# Patient Record
Sex: MALE | Race: WHITE | NOT HISPANIC OR LATINO | Employment: OTHER | URBAN - METROPOLITAN AREA
[De-identification: names, ages, dates, MRNs, and addresses within clinical notes are randomized per-mention and may not be internally consistent; named-entity substitution may affect disease eponyms.]

---

## 2017-01-01 ENCOUNTER — APPOINTMENT (OUTPATIENT)
Dept: ONCOLOGY | Facility: HOSPITAL | Age: 72
End: 2017-01-01

## 2017-01-01 ENCOUNTER — INFUSION (OUTPATIENT)
Dept: ONCOLOGY | Facility: HOSPITAL | Age: 72
End: 2017-01-01

## 2017-01-01 ENCOUNTER — LAB (OUTPATIENT)
Dept: LAB | Facility: HOSPITAL | Age: 72
End: 2017-01-01

## 2017-01-01 ENCOUNTER — TELEPHONE (OUTPATIENT)
Dept: ONCOLOGY | Facility: CLINIC | Age: 72
End: 2017-01-01

## 2017-01-01 ENCOUNTER — SPECIALTY PHARMACY (OUTPATIENT)
Dept: ONCOLOGY | Facility: HOSPITAL | Age: 72
End: 2017-01-01

## 2017-01-01 ENCOUNTER — OFFICE VISIT (OUTPATIENT)
Dept: ONCOLOGY | Facility: CLINIC | Age: 72
End: 2017-01-01

## 2017-01-01 ENCOUNTER — TELEPHONE (OUTPATIENT)
Dept: NEUROLOGY | Facility: CLINIC | Age: 72
End: 2017-01-01

## 2017-01-01 ENCOUNTER — OFFICE VISIT (OUTPATIENT)
Dept: NEUROLOGY | Facility: CLINIC | Age: 72
End: 2017-01-01

## 2017-01-01 VITALS
HEIGHT: 70 IN | TEMPERATURE: 97.1 F | SYSTOLIC BLOOD PRESSURE: 138 MMHG | WEIGHT: 290 LBS | BODY MASS INDEX: 41.52 KG/M2 | RESPIRATION RATE: 20 BRPM | DIASTOLIC BLOOD PRESSURE: 78 MMHG | HEART RATE: 87 BPM

## 2017-01-01 VITALS
SYSTOLIC BLOOD PRESSURE: 118 MMHG | HEIGHT: 70 IN | WEIGHT: 290 LBS | BODY MASS INDEX: 41.52 KG/M2 | DIASTOLIC BLOOD PRESSURE: 68 MMHG

## 2017-01-01 VITALS
DIASTOLIC BLOOD PRESSURE: 72 MMHG | BODY MASS INDEX: 42.37 KG/M2 | TEMPERATURE: 97.4 F | HEIGHT: 70 IN | SYSTOLIC BLOOD PRESSURE: 152 MMHG | HEART RATE: 94 BPM | RESPIRATION RATE: 18 BRPM | WEIGHT: 296 LBS

## 2017-01-01 VITALS
WEIGHT: 293 LBS | RESPIRATION RATE: 22 BRPM | BODY MASS INDEX: 42.04 KG/M2 | TEMPERATURE: 97.7 F | DIASTOLIC BLOOD PRESSURE: 75 MMHG | HEART RATE: 78 BPM | SYSTOLIC BLOOD PRESSURE: 129 MMHG

## 2017-01-01 DIAGNOSIS — C90.02 MULTIPLE MYELOMA IN RELAPSE (HCC): ICD-10-CM

## 2017-01-01 DIAGNOSIS — C90.02 MULTIPLE MYELOMA IN RELAPSE (HCC): Primary | ICD-10-CM

## 2017-01-01 DIAGNOSIS — G40.109 LOCALIZATION-RELATED SYMPTOMATIC EPILEPSY AND EPILEPTIC SYNDROMES WITH SIMPLE PARTIAL SEIZURES, NOT INTRACTABLE, WITHOUT STATUS EPILEPTICUS (HCC): ICD-10-CM

## 2017-01-01 DIAGNOSIS — G40.109 LOCALIZATION-RELATED SYMPTOMATIC EPILEPSY AND EPILEPTIC SYNDROMES WITH SIMPLE PARTIAL SEIZURES, NOT INTRACTABLE, WITHOUT STATUS EPILEPTICUS (HCC): Primary | ICD-10-CM

## 2017-01-01 DIAGNOSIS — R25.1 TREMOR: ICD-10-CM

## 2017-01-01 DIAGNOSIS — C90.00 MULTIPLE MYELOMA, REMISSION STATUS UNSPECIFIED (HCC): Primary | ICD-10-CM

## 2017-01-01 DIAGNOSIS — G63 POLYNEUROPATHY ASSOCIATED WITH UNDERLYING DISEASE (HCC): ICD-10-CM

## 2017-01-01 LAB
ALBUMIN SERPL-MCNC: 3.6 G/DL (ref 3.2–4.8)
ALBUMIN SERPL-MCNC: 3.6 G/DL (ref 3.2–4.8)
ALBUMIN SERPL-MCNC: 3.8 G/DL (ref 3.2–4.8)
ALBUMIN SERPL-MCNC: 3.9 G/DL (ref 3.2–4.8)
ALBUMIN SERPL-MCNC: 4 G/DL (ref 3.2–4.8)
ALBUMIN SERPL-MCNC: 4 G/DL (ref 3.2–4.8)
ALBUMIN/GLOB SERPL: 1.6 G/DL (ref 1.5–2.5)
ALBUMIN/GLOB SERPL: 1.6 G/DL (ref 1.5–2.5)
ALBUMIN/GLOB SERPL: 1.7 G/DL (ref 1.5–2.5)
ALBUMIN/GLOB SERPL: 1.8 G/DL (ref 1.5–2.5)
ALBUMIN/GLOB SERPL: 2 G/DL (ref 1.5–2.5)
ALBUMIN/GLOB SERPL: 2.2 G/DL (ref 1.5–2.5)
ALP SERPL-CCNC: 116 U/L (ref 25–100)
ALP SERPL-CCNC: 169 U/L (ref 25–100)
ALP SERPL-CCNC: 226 U/L (ref 25–100)
ALP SERPL-CCNC: 279 U/L (ref 25–100)
ALP SERPL-CCNC: 71 U/L (ref 25–100)
ALP SERPL-CCNC: 93 U/L (ref 25–100)
ALT SERPL W P-5'-P-CCNC: 1315 U/L (ref 7–40)
ALT SERPL W P-5'-P-CCNC: 1318 U/L (ref 7–40)
ALT SERPL W P-5'-P-CCNC: 30 U/L (ref 7–40)
ALT SERPL W P-5'-P-CCNC: 34 U/L (ref 7–40)
ALT SERPL W P-5'-P-CCNC: 37 U/L (ref 7–40)
ALT SERPL W P-5'-P-CCNC: 710 U/L (ref 7–40)
ANION GAP SERPL CALCULATED.3IONS-SCNC: 3 MMOL/L (ref 3–11)
ANION GAP SERPL CALCULATED.3IONS-SCNC: 5 MMOL/L (ref 3–11)
ANION GAP SERPL CALCULATED.3IONS-SCNC: 6 MMOL/L (ref 3–11)
ANION GAP SERPL CALCULATED.3IONS-SCNC: 7 MMOL/L (ref 3–11)
ANION GAP SERPL CALCULATED.3IONS-SCNC: 7 MMOL/L (ref 3–11)
ANION GAP SERPL CALCULATED.3IONS-SCNC: 8 MMOL/L (ref 3–11)
AST SERPL-CCNC: 20 U/L (ref 0–33)
AST SERPL-CCNC: 27 U/L (ref 0–33)
AST SERPL-CCNC: 30 U/L (ref 0–33)
AST SERPL-CCNC: 391 U/L (ref 0–33)
AST SERPL-CCNC: 535 U/L (ref 0–33)
AST SERPL-CCNC: 92 U/L (ref 0–33)
BILIRUB SERPL-MCNC: 0.4 MG/DL (ref 0.3–1.2)
BILIRUB SERPL-MCNC: 0.5 MG/DL (ref 0.3–1.2)
BILIRUB SERPL-MCNC: 0.6 MG/DL (ref 0.3–1.2)
BILIRUB SERPL-MCNC: 0.6 MG/DL (ref 0.3–1.2)
BILIRUB SERPL-MCNC: 0.7 MG/DL (ref 0.3–1.2)
BILIRUB SERPL-MCNC: 0.8 MG/DL (ref 0.3–1.2)
BUN BLD-MCNC: 16 MG/DL (ref 9–23)
BUN BLD-MCNC: 17 MG/DL (ref 9–23)
BUN BLD-MCNC: 17 MG/DL (ref 9–23)
BUN BLD-MCNC: 18 MG/DL (ref 9–23)
BUN BLD-MCNC: 21 MG/DL (ref 9–23)
BUN BLD-MCNC: 22 MG/DL (ref 9–23)
BUN/CREAT SERPL: 18.9 (ref 7–25)
BUN/CREAT SERPL: 18.9 (ref 7–25)
BUN/CREAT SERPL: 20 (ref 7–25)
BUN/CREAT SERPL: 20 (ref 7–25)
BUN/CREAT SERPL: 24.4 (ref 7–25)
BUN/CREAT SERPL: 26.3 (ref 7–25)
CALCIUM SPEC-SCNC: 8.4 MG/DL (ref 8.7–10.4)
CALCIUM SPEC-SCNC: 8.6 MG/DL (ref 8.7–10.4)
CALCIUM SPEC-SCNC: 8.7 MG/DL (ref 8.7–10.4)
CALCIUM SPEC-SCNC: 8.9 MG/DL (ref 8.7–10.4)
CALCIUM SPEC-SCNC: 9 MG/DL (ref 8.7–10.4)
CALCIUM SPEC-SCNC: 9.4 MG/DL (ref 8.7–10.4)
CHLORIDE SERPL-SCNC: 103 MMOL/L (ref 99–109)
CHLORIDE SERPL-SCNC: 104 MMOL/L (ref 99–109)
CHLORIDE SERPL-SCNC: 107 MMOL/L (ref 99–109)
CHLORIDE SERPL-SCNC: 107 MMOL/L (ref 99–109)
CHLORIDE SERPL-SCNC: 108 MMOL/L (ref 99–109)
CHLORIDE SERPL-SCNC: 109 MMOL/L (ref 99–109)
CO2 SERPL-SCNC: 25 MMOL/L (ref 20–31)
CO2 SERPL-SCNC: 25 MMOL/L (ref 20–31)
CO2 SERPL-SCNC: 26 MMOL/L (ref 20–31)
CO2 SERPL-SCNC: 29 MMOL/L (ref 20–31)
CO2 SERPL-SCNC: 29 MMOL/L (ref 20–31)
CO2 SERPL-SCNC: 31 MMOL/L (ref 20–31)
CREAT BLD-MCNC: 0.8 MG/DL (ref 0.6–1.3)
CREAT BLD-MCNC: 0.8 MG/DL (ref 0.6–1.3)
CREAT BLD-MCNC: 0.9 MG/DL (ref 0.6–1.3)
CREAT BLDA-MCNC: 0.9 MG/DL (ref 0.6–1.3)
CREAT BLDA-MCNC: 0.9 MG/DL (ref 0.6–1.3)
ERYTHROCYTE [DISTWIDTH] IN BLOOD BY AUTOMATED COUNT: 16.5 % (ref 11.3–14.5)
ERYTHROCYTE [DISTWIDTH] IN BLOOD BY AUTOMATED COUNT: 17.2 % (ref 11.3–14.5)
ERYTHROCYTE [DISTWIDTH] IN BLOOD BY AUTOMATED COUNT: 18.4 % (ref 11.3–14.5)
ERYTHROCYTE [DISTWIDTH] IN BLOOD BY AUTOMATED COUNT: 18.6 % (ref 11.3–14.5)
ERYTHROCYTE [DISTWIDTH] IN BLOOD BY AUTOMATED COUNT: 19.4 % (ref 11.3–14.5)
GFR SERPL CREATININE-BSD FRML MDRD: 83 ML/MIN/1.73
GFR SERPL CREATININE-BSD FRML MDRD: 96 ML/MIN/1.73
GFR SERPL CREATININE-BSD FRML MDRD: 96 ML/MIN/1.73
GLOBULIN UR ELPH-MCNC: 1.8 GM/DL
GLOBULIN UR ELPH-MCNC: 1.9 GM/DL
GLOBULIN UR ELPH-MCNC: 2 GM/DL
GLOBULIN UR ELPH-MCNC: 2.2 GM/DL
GLOBULIN UR ELPH-MCNC: 2.3 GM/DL
GLOBULIN UR ELPH-MCNC: 2.5 GM/DL
GLUCOSE BLD-MCNC: 102 MG/DL (ref 70–100)
GLUCOSE BLD-MCNC: 102 MG/DL (ref 70–100)
GLUCOSE BLD-MCNC: 130 MG/DL (ref 70–100)
GLUCOSE BLD-MCNC: 136 MG/DL (ref 70–100)
GLUCOSE BLD-MCNC: 155 MG/DL (ref 70–100)
GLUCOSE BLD-MCNC: 184 MG/DL (ref 70–100)
HCT VFR BLD AUTO: 41.3 % (ref 38.9–50.9)
HCT VFR BLD AUTO: 41.4 % (ref 38.9–50.9)
HCT VFR BLD AUTO: 43.3 % (ref 38.9–50.9)
HCT VFR BLD AUTO: 43.4 % (ref 38.9–50.9)
HCT VFR BLD AUTO: 44.2 % (ref 38.9–50.9)
HGB BLD-MCNC: 12.9 G/DL (ref 13.1–17.5)
HGB BLD-MCNC: 13.2 G/DL (ref 13.1–17.5)
HGB BLD-MCNC: 13.6 G/DL (ref 13.1–17.5)
HGB BLD-MCNC: 13.7 G/DL (ref 13.1–17.5)
HGB BLD-MCNC: 13.9 G/DL (ref 13.1–17.5)
KAPPA LC SERPL-MCNC: 220.7 MG/L (ref 3.3–19.4)
KAPPA LC SERPL-MCNC: 252.4 MG/L (ref 3.3–19.4)
KAPPA LC/LAMBDA SER: 19.27 {RATIO} (ref 0.26–1.65)
KAPPA LC/LAMBDA SER: 23.23 {RATIO} (ref 0.26–1.65)
LAMBDA LC FREE SERPL-MCNC: 13.1 MG/L (ref 5.7–26.3)
LAMBDA LC FREE SERPL-MCNC: 9.5 MG/L (ref 5.7–26.3)
LYMPHOCYTES # BLD AUTO: 0.4 10*3/MM3 (ref 0.6–4.8)
LYMPHOCYTES # BLD AUTO: 0.6 10*3/MM3 (ref 0.6–4.8)
LYMPHOCYTES # BLD AUTO: 0.7 10*3/MM3 (ref 0.6–4.8)
LYMPHOCYTES # BLD AUTO: 1.3 10*3/MM3 (ref 0.6–4.8)
LYMPHOCYTES # BLD AUTO: 1.3 10*3/MM3 (ref 0.6–4.8)
LYMPHOCYTES NFR BLD AUTO: 11.6 % (ref 24–44)
LYMPHOCYTES NFR BLD AUTO: 17.6 % (ref 24–44)
LYMPHOCYTES NFR BLD AUTO: 24.3 % (ref 24–44)
LYMPHOCYTES NFR BLD AUTO: 28.1 % (ref 24–44)
LYMPHOCYTES NFR BLD AUTO: 36.3 % (ref 24–44)
MAGNESIUM SERPL-MCNC: 1.8 MG/DL (ref 1.3–2.7)
MAGNESIUM SERPL-MCNC: 1.9 MG/DL (ref 1.3–2.7)
MCH RBC QN AUTO: 30.7 PG (ref 27–31)
MCH RBC QN AUTO: 30.7 PG (ref 27–31)
MCH RBC QN AUTO: 30.8 PG (ref 27–31)
MCH RBC QN AUTO: 31 PG (ref 27–31)
MCH RBC QN AUTO: 31.1 PG (ref 27–31)
MCHC RBC AUTO-ENTMCNC: 31.3 G/DL (ref 32–36)
MCHC RBC AUTO-ENTMCNC: 31.5 G/DL (ref 32–36)
MCHC RBC AUTO-ENTMCNC: 31.9 G/DL (ref 32–36)
MCV RBC AUTO: 97 FL (ref 80–99)
MCV RBC AUTO: 97.3 FL (ref 80–99)
MCV RBC AUTO: 97.4 FL (ref 80–99)
MCV RBC AUTO: 98.5 FL (ref 80–99)
MCV RBC AUTO: 98.8 FL (ref 80–99)
MONOCYTES # BLD AUTO: 0.1 10*3/MM3 (ref 0–1)
MONOCYTES # BLD AUTO: 0.2 10*3/MM3 (ref 0–1)
MONOCYTES # BLD AUTO: 0.3 10*3/MM3 (ref 0–1)
MONOCYTES NFR BLD AUTO: 2.8 % (ref 0–12)
MONOCYTES NFR BLD AUTO: 2.8 % (ref 0–12)
MONOCYTES NFR BLD AUTO: 2.9 % (ref 0–12)
MONOCYTES NFR BLD AUTO: 8.1 % (ref 0–12)
MONOCYTES NFR BLD AUTO: 8.3 % (ref 0–12)
NEUTROPHILS # BLD AUTO: 1.7 10*3/MM3 (ref 1.5–8.3)
NEUTROPHILS # BLD AUTO: 1.9 10*3/MM3 (ref 1.5–8.3)
NEUTROPHILS # BLD AUTO: 2.5 10*3/MM3 (ref 1.5–8.3)
NEUTROPHILS # BLD AUTO: 3.1 10*3/MM3 (ref 1.5–8.3)
NEUTROPHILS # BLD AUTO: 3.8 10*3/MM3 (ref 1.5–8.3)
NEUTROPHILS NFR BLD AUTO: 55.6 % (ref 41–71)
NEUTROPHILS NFR BLD AUTO: 63.6 % (ref 41–71)
NEUTROPHILS NFR BLD AUTO: 72.9 % (ref 41–71)
NEUTROPHILS NFR BLD AUTO: 79.6 % (ref 41–71)
NEUTROPHILS NFR BLD AUTO: 85.5 % (ref 41–71)
PHOSPHATE SERPL-MCNC: 1.1 MG/DL (ref 2.4–5.1)
PHOSPHATE SERPL-MCNC: 1.8 MG/DL (ref 2.4–5.1)
PLATELET # BLD AUTO: 100 10*3/MM3 (ref 150–450)
PLATELET # BLD AUTO: 109 10*3/MM3 (ref 150–450)
PLATELET # BLD AUTO: 120 10*3/MM3 (ref 150–450)
PLATELET # BLD AUTO: 72 10*3/MM3 (ref 150–450)
PLATELET # BLD AUTO: 89 10*3/MM3 (ref 150–450)
PMV BLD AUTO: 7.7 FL (ref 6–12)
PMV BLD AUTO: 8 FL (ref 6–12)
PMV BLD AUTO: 8.7 FL (ref 6–12)
PMV BLD AUTO: 9.2 FL (ref 6–12)
PMV BLD AUTO: 9.2 FL (ref 6–12)
POTASSIUM BLD-SCNC: 4 MMOL/L (ref 3.5–5.5)
POTASSIUM BLD-SCNC: 4.1 MMOL/L (ref 3.5–5.5)
POTASSIUM BLD-SCNC: 4.3 MMOL/L (ref 3.5–5.5)
POTASSIUM BLD-SCNC: 4.3 MMOL/L (ref 3.5–5.5)
POTASSIUM BLD-SCNC: 4.4 MMOL/L (ref 3.5–5.5)
POTASSIUM BLD-SCNC: 4.5 MMOL/L (ref 3.5–5.5)
PROT SERPL-MCNC: 5.6 G/DL (ref 5.7–8.2)
PROT SERPL-MCNC: 5.7 G/DL (ref 5.7–8.2)
PROT SERPL-MCNC: 5.7 G/DL (ref 5.7–8.2)
PROT SERPL-MCNC: 5.8 G/DL (ref 5.7–8.2)
PROT SERPL-MCNC: 6.3 G/DL (ref 5.7–8.2)
PROT SERPL-MCNC: 6.5 G/DL (ref 5.7–8.2)
RBC # BLD AUTO: 4.19 10*6/MM3 (ref 4.2–5.76)
RBC # BLD AUTO: 4.27 10*6/MM3 (ref 4.2–5.76)
RBC # BLD AUTO: 4.39 10*6/MM3 (ref 4.2–5.76)
RBC # BLD AUTO: 4.45 10*6/MM3 (ref 4.2–5.76)
RBC # BLD AUTO: 4.54 10*6/MM3 (ref 4.2–5.76)
SODIUM BLD-SCNC: 136 MMOL/L (ref 132–146)
SODIUM BLD-SCNC: 139 MMOL/L (ref 132–146)
SODIUM BLD-SCNC: 141 MMOL/L (ref 132–146)
VALPROATE SERPL-MCNC: 45 MCG/ML (ref 50–150)
WBC NRBC COR # BLD: 2.6 10*3/MM3 (ref 3.5–10.8)
WBC NRBC COR # BLD: 3.2 10*3/MM3 (ref 3.5–10.8)
WBC NRBC COR # BLD: 3.5 10*3/MM3 (ref 3.5–10.8)
WBC NRBC COR # BLD: 3.6 10*3/MM3 (ref 3.5–10.8)
WBC NRBC COR # BLD: 5.2 10*3/MM3 (ref 3.5–10.8)

## 2017-01-01 PROCEDURE — 80053 COMPREHEN METABOLIC PANEL: CPT | Performed by: INTERNAL MEDICINE

## 2017-01-01 PROCEDURE — 84100 ASSAY OF PHOSPHORUS: CPT

## 2017-01-01 PROCEDURE — 36415 COLL VENOUS BLD VENIPUNCTURE: CPT

## 2017-01-01 PROCEDURE — 83735 ASSAY OF MAGNESIUM: CPT

## 2017-01-01 PROCEDURE — 80053 COMPREHEN METABOLIC PANEL: CPT

## 2017-01-01 PROCEDURE — 80164 ASSAY DIPROPYLACETIC ACD TOT: CPT | Performed by: INTERNAL MEDICINE

## 2017-01-01 PROCEDURE — 99214 OFFICE O/P EST MOD 30 MIN: CPT | Performed by: INTERNAL MEDICINE

## 2017-01-01 PROCEDURE — 36415 COLL VENOUS BLD VENIPUNCTURE: CPT | Performed by: INTERNAL MEDICINE

## 2017-01-01 PROCEDURE — 85025 COMPLETE CBC W/AUTO DIFF WBC: CPT

## 2017-01-01 PROCEDURE — 96374 THER/PROPH/DIAG INJ IV PUSH: CPT

## 2017-01-01 PROCEDURE — 83883 ASSAY NEPHELOMETRY NOT SPEC: CPT | Performed by: INTERNAL MEDICINE

## 2017-01-01 PROCEDURE — 25010000002 ZOLEDRONIC ACID PER 1 MG: Performed by: INTERNAL MEDICINE

## 2017-01-01 PROCEDURE — 82565 ASSAY OF CREATININE: CPT

## 2017-01-01 PROCEDURE — 99215 OFFICE O/P EST HI 40 MIN: CPT | Performed by: INTERNAL MEDICINE

## 2017-01-01 PROCEDURE — 99214 OFFICE O/P EST MOD 30 MIN: CPT | Performed by: PSYCHIATRY & NEUROLOGY

## 2017-01-01 RX ORDER — BENZONATATE 200 MG/1
CAPSULE ORAL
Refills: 0 | COMMUNITY
Start: 2017-01-01 | End: 2018-01-01

## 2017-01-01 RX ORDER — LENALIDOMIDE 25 MG/1
25 CAPSULE ORAL DAILY
Qty: 14 CAPSULE | Refills: 0 | Status: SHIPPED | OUTPATIENT
Start: 2017-01-01 | End: 2017-01-01 | Stop reason: SDUPTHER

## 2017-01-01 RX ORDER — PANTOPRAZOLE SODIUM 40 MG/1
40 TABLET, DELAYED RELEASE ORAL DAILY
Qty: 30 TABLET | Refills: 5 | Status: SHIPPED | OUTPATIENT
Start: 2017-01-01 | End: 2018-01-01 | Stop reason: SDUPTHER

## 2017-01-01 RX ORDER — PREDNISONE 20 MG/1
TABLET ORAL
Refills: 0 | COMMUNITY
Start: 2017-01-01 | End: 2018-01-01

## 2017-01-01 RX ORDER — SODIUM CHLORIDE 9 MG/ML
250 INJECTION, SOLUTION INTRAVENOUS ONCE
Status: CANCELLED | OUTPATIENT
Start: 2017-01-01

## 2017-01-01 RX ORDER — AMOXICILLIN AND CLAVULANATE POTASSIUM 875; 125 MG/1; MG/1
TABLET, FILM COATED ORAL
Refills: 0 | COMMUNITY
Start: 2017-01-01 | End: 2018-01-01

## 2017-01-01 RX ORDER — ALBUTEROL SULFATE 90 UG/1
POWDER, METERED RESPIRATORY (INHALATION)
Refills: 0 | COMMUNITY
Start: 2017-01-01 | End: 2018-01-01

## 2017-01-01 RX ORDER — DEXTROMETHORPHAN HYDROBROMIDE AND PROMETHAZINE HYDROCHLORIDE 15; 6.25 MG/5ML; MG/5ML
SYRUP ORAL
Refills: 0 | COMMUNITY
Start: 2017-01-01 | End: 2018-01-01

## 2017-01-01 RX ORDER — DEXAMETHASONE 4 MG/1
TABLET ORAL
Qty: 40 TABLET | Refills: 5 | Status: SHIPPED | OUTPATIENT
Start: 2017-01-01 | End: 2018-01-01 | Stop reason: SDUPTHER

## 2017-01-01 RX ORDER — BACLOFEN 10 MG/1
TABLET ORAL
Refills: 2 | COMMUNITY
Start: 2017-01-01

## 2017-01-01 RX ORDER — DIVALPROEX SODIUM 250 MG/1
TABLET, EXTENDED RELEASE ORAL
Qty: 120 TABLET | Refills: 1 | Status: SHIPPED | OUTPATIENT
Start: 2017-01-01 | End: 2018-01-01 | Stop reason: HOSPADM

## 2017-01-01 RX ORDER — PROMETHAZINE HYDROCHLORIDE AND PHENYLEPHRINE HYDROCHLORIDE 6.25; 5 MG/5ML; MG/5ML
SYRUP ORAL
Refills: 0 | COMMUNITY
Start: 2017-01-01 | End: 2018-01-01

## 2017-01-01 RX ORDER — LENALIDOMIDE 25 MG/1
25 CAPSULE ORAL DAILY
Qty: 14 CAPSULE | Refills: 0 | Status: SHIPPED | OUTPATIENT
Start: 2017-01-01 | End: 2018-01-01 | Stop reason: SDUPTHER

## 2017-01-01 RX ORDER — GABAPENTIN 300 MG/1
CAPSULE ORAL
Qty: 90 CAPSULE | Refills: 0 | Status: SHIPPED | OUTPATIENT
Start: 2017-01-01 | End: 2018-01-01 | Stop reason: SDUPTHER

## 2017-01-01 RX ORDER — GABAPENTIN 300 MG/1
CAPSULE ORAL
Qty: 90 CAPSULE | Refills: 1 | OUTPATIENT
Start: 2017-01-01 | End: 2018-01-01 | Stop reason: SDUPTHER

## 2017-01-01 RX ADMIN — ZOLEDRONIC ACID 4 MG: 4 INJECTION, SOLUTION, CONCENTRATE INTRAVENOUS at 11:50

## 2017-01-01 RX ADMIN — ZOLEDRONIC ACID 4 MG: 4 INJECTION, SOLUTION, CONCENTRATE INTRAVENOUS at 12:30

## 2017-01-03 RX ORDER — LENALIDOMIDE 25 MG/1
25 CAPSULE ORAL DAILY
Qty: 14 CAPSULE | Refills: 0 | Status: SHIPPED | OUTPATIENT
Start: 2017-01-03 | End: 2017-01-26 | Stop reason: SDUPTHER

## 2017-01-09 RX ORDER — GABAPENTIN 300 MG/1
CAPSULE ORAL
Qty: 90 CAPSULE | Refills: 0 | OUTPATIENT
Start: 2017-01-09

## 2017-01-26 RX ORDER — LENALIDOMIDE 25 MG/1
25 CAPSULE ORAL DAILY
Qty: 14 CAPSULE | Refills: 0 | Status: SHIPPED | OUTPATIENT
Start: 2017-01-26 | End: 2017-02-16 | Stop reason: SDUPTHER

## 2017-01-31 ENCOUNTER — OFFICE VISIT (OUTPATIENT)
Dept: NEUROLOGY | Facility: CLINIC | Age: 72
End: 2017-01-31

## 2017-01-31 VITALS — BODY MASS INDEX: 35.07 KG/M2 | HEIGHT: 70 IN | WEIGHT: 245 LBS

## 2017-01-31 DIAGNOSIS — G40.109 PARTIAL EPILEPSY (HCC): ICD-10-CM

## 2017-01-31 DIAGNOSIS — Z13.1 ENCOUNTER FOR SCREENING FOR DIABETES MELLITUS: ICD-10-CM

## 2017-01-31 DIAGNOSIS — N18.9 CHRONIC KIDNEY DISEASE, UNSPECIFIED STAGE: ICD-10-CM

## 2017-01-31 DIAGNOSIS — G40.109 SIMPLE PARTIAL SEIZURE, CONSCIOUSNESS NOT IMPAIRED (HCC): ICD-10-CM

## 2017-01-31 DIAGNOSIS — I63.9 CEREBROVASCULAR ACCIDENT (CVA), UNSPECIFIED MECHANISM (HCC): ICD-10-CM

## 2017-01-31 DIAGNOSIS — G62.9 PERIPHERAL POLYNEUROPATHY: ICD-10-CM

## 2017-01-31 DIAGNOSIS — C90.00 MULTIPLE MYELOMA AND IMMUNOPROLIFERATIVE NEOPLASMS (HCC): ICD-10-CM

## 2017-01-31 DIAGNOSIS — C88.9 MULTIPLE MYELOMA AND IMMUNOPROLIFERATIVE NEOPLASMS (HCC): ICD-10-CM

## 2017-01-31 DIAGNOSIS — G62.9 NEUROPATHY: Primary | ICD-10-CM

## 2017-01-31 PROBLEM — Z79.899 MEDICATION MANAGEMENT: Status: ACTIVE | Noted: 2017-01-31

## 2017-01-31 PROCEDURE — 99215 OFFICE O/P EST HI 40 MIN: CPT | Performed by: PSYCHIATRY & NEUROLOGY

## 2017-02-13 ENCOUNTER — TELEPHONE (OUTPATIENT)
Dept: NEUROLOGY | Facility: CLINIC | Age: 72
End: 2017-02-13

## 2017-02-13 NOTE — TELEPHONE ENCOUNTER
All notes and information that Dr. Mcclure listed in her note have been mailed to the patient for his appointment with .

## 2017-02-13 NOTE — TELEPHONE ENCOUNTER
----- Message from Yakelin Mcclure MD sent at 2/10/2017  2:38 PM EST -----  Regarding: RE: Referral   I spoke with Dr Ross at , and he said they would contact the pt to get him in soon. Can you please make copies of his old and recent NCS/EMG, all the labs I've done, his most recent note, and labs from hospital stay this past year, including CSF results (protein in particular), and maybe the most recent oncology note from Dr Segovia for the pt to hand carry to the appt? Thanks (not sure if he needs to sign something for us to do this).  ----- Message -----     From: Mariusz Black CMA     Sent: 2/6/2017   9:19 AM       To: Yakelin Mcclure MD  Subject: Referral                                       Got Mr. Ramirez set up with  with Dr. Ross, first available was July 7th. I went ahead and made that appointment. In your note you mentioned reaching out to the physician. I also had them put Mr. Ramirez on the wait list as well. AG  ----- Message -----     From: Yakelin Mcclure MD     Sent: 2/2/2017   5:39 PM       To: Mariusz Black CMA    I think I made referral to neuromuscular, but pls check. Pls also let pt know that I spoke with Luca, and we are uncertain if it is the revlimid that is causing problems (and dr segovia is reluctant to change it since it is working so well for his myeloma). Also let me know if the referral to  is set up for eg 4 months or any sort of long time, as I might then try to reach the physician myself.

## 2017-02-16 RX ORDER — LENALIDOMIDE 25 MG/1
25 CAPSULE ORAL DAILY
Qty: 14 CAPSULE | Refills: 0 | Status: SHIPPED | OUTPATIENT
Start: 2017-02-16 | End: 2017-04-14

## 2017-02-17 ENCOUNTER — LAB (OUTPATIENT)
Dept: LAB | Facility: HOSPITAL | Age: 72
End: 2017-02-17

## 2017-02-17 DIAGNOSIS — C90.02 MULTIPLE MYELOMA IN RELAPSE (HCC): ICD-10-CM

## 2017-02-17 LAB
ANION GAP SERPL CALCULATED.3IONS-SCNC: 8 MMOL/L (ref 3–11)
BUN BLD-MCNC: 19 MG/DL (ref 9–23)
BUN/CREAT SERPL: 23.8 (ref 7–25)
CALCIUM SPEC-SCNC: 9.5 MG/DL (ref 8.7–10.4)
CHLORIDE SERPL-SCNC: 106 MMOL/L (ref 99–109)
CO2 SERPL-SCNC: 27 MMOL/L (ref 20–31)
CREAT BLD-MCNC: 0.8 MG/DL (ref 0.6–1.3)
ERYTHROCYTE [DISTWIDTH] IN BLOOD BY AUTOMATED COUNT: 16.1 % (ref 11.3–14.5)
GFR SERPL CREATININE-BSD FRML MDRD: 96 ML/MIN/1.73
GLUCOSE BLD-MCNC: 86 MG/DL (ref 70–100)
HCT VFR BLD AUTO: 41.5 % (ref 38.9–50.9)
HGB BLD-MCNC: 13.3 G/DL (ref 13.1–17.5)
LYMPHOCYTES # BLD AUTO: 1.3 10*3/MM3 (ref 0.6–4.8)
LYMPHOCYTES NFR BLD AUTO: 46.3 % (ref 24–44)
MCH RBC QN AUTO: 31.1 PG (ref 27–31)
MCHC RBC AUTO-ENTMCNC: 32 G/DL (ref 32–36)
MCV RBC AUTO: 97.2 FL (ref 80–99)
MONOCYTES # BLD AUTO: 0.2 10*3/MM3 (ref 0–1)
MONOCYTES NFR BLD AUTO: 6 % (ref 0–12)
NEUTROPHILS # BLD AUTO: 1.3 10*3/MM3 (ref 1.5–8.3)
NEUTROPHILS NFR BLD AUTO: 47.7 % (ref 41–71)
PLATELET # BLD AUTO: 91 10*3/MM3 (ref 150–450)
PMV BLD AUTO: 7.9 FL (ref 6–12)
POTASSIUM BLD-SCNC: 4.5 MMOL/L (ref 3.5–5.5)
RBC # BLD AUTO: 4.27 10*6/MM3 (ref 4.2–5.76)
SODIUM BLD-SCNC: 141 MMOL/L (ref 132–146)
WBC NRBC COR # BLD: 2.7 10*3/MM3 (ref 3.5–10.8)

## 2017-02-17 PROCEDURE — 80048 BASIC METABOLIC PNL TOTAL CA: CPT | Performed by: INTERNAL MEDICINE

## 2017-02-17 PROCEDURE — 85025 COMPLETE CBC W/AUTO DIFF WBC: CPT

## 2017-02-17 PROCEDURE — 84165 PROTEIN E-PHORESIS SERUM: CPT | Performed by: INTERNAL MEDICINE

## 2017-02-17 PROCEDURE — 83883 ASSAY NEPHELOMETRY NOT SPEC: CPT | Performed by: INTERNAL MEDICINE

## 2017-02-17 PROCEDURE — 36415 COLL VENOUS BLD VENIPUNCTURE: CPT

## 2017-02-17 PROCEDURE — 84155 ASSAY OF PROTEIN SERUM: CPT | Performed by: INTERNAL MEDICINE

## 2017-02-18 LAB
KAPPA LC SERPL-MCNC: 635.66 MG/L (ref 3.3–19.4)
KAPPA LC/LAMBDA SER: 39.36 {RATIO} (ref 0.26–1.65)
LAMBDA LC FREE SERPL-MCNC: 16.15 MG/L (ref 5.71–26.3)

## 2017-02-20 LAB
ALBUMIN SERPL-MCNC: 3.5 G/DL (ref 2.9–4.4)
ALBUMIN/GLOB SERPL: 1.3 {RATIO} (ref 0.7–1.7)
ALPHA1 GLOB FLD ELPH-MCNC: 0.3 G/DL (ref 0–0.4)
ALPHA2 GLOB SERPL ELPH-MCNC: 0.5 G/DL (ref 0.4–1)
B-GLOBULIN SERPL ELPH-MCNC: 1 G/DL (ref 0.7–1.3)
GAMMA GLOB SERPL ELPH-MCNC: 0.8 G/DL (ref 0.4–1.8)
GLOBULIN SER CALC-MCNC: 2.6 G/DL (ref 2.2–3.9)
Lab: NORMAL
M-SPIKE: NORMAL G/DL
PROT SERPL-MCNC: 6.1 G/DL (ref 6–8.5)

## 2017-02-21 ENCOUNTER — OFFICE VISIT (OUTPATIENT)
Dept: ONCOLOGY | Facility: CLINIC | Age: 72
End: 2017-02-21

## 2017-02-21 ENCOUNTER — HOSPITAL ENCOUNTER (OUTPATIENT)
Dept: GENERAL RADIOLOGY | Facility: HOSPITAL | Age: 72
Discharge: HOME | End: 2017-02-21
Attending: INTERNAL MEDICINE | Admitting: INTERNAL MEDICINE

## 2017-02-21 VITALS
HEIGHT: 70 IN | SYSTOLIC BLOOD PRESSURE: 115 MMHG | HEART RATE: 73 BPM | WEIGHT: 264 LBS | BODY MASS INDEX: 37.8 KG/M2 | RESPIRATION RATE: 18 BRPM | TEMPERATURE: 97.9 F | DIASTOLIC BLOOD PRESSURE: 74 MMHG

## 2017-02-21 DIAGNOSIS — C90.02 MULTIPLE MYELOMA IN RELAPSE (HCC): Primary | ICD-10-CM

## 2017-02-21 DIAGNOSIS — C90.02 MULTIPLE MYELOMA IN RELAPSE (HCC): ICD-10-CM

## 2017-02-21 PROCEDURE — 99215 OFFICE O/P EST HI 40 MIN: CPT | Performed by: INTERNAL MEDICINE

## 2017-02-21 PROCEDURE — 77075 RADEX OSSEOUS SURVEY COMPL: CPT

## 2017-02-21 RX ORDER — METHYLPHENIDATE HYDROCHLORIDE 36 MG/1
36 TABLET ORAL EVERY MORNING
COMMUNITY
Start: 2017-02-15

## 2017-02-21 NOTE — PROGRESS NOTES
DATE OF VISIT: 2/21/2017    REASON FOR VISIT: Followup for multiple myeloma, M-spike at diagnosis 2.4,  kappa restricted, with diffuse lytic lesions.      HISTORY OF PRESENT ILLNESS: The patient is a very pleasant 70-year-old  gentleman with past medical history significant for multiple myeloma diagnosed  11/26/2013. The patient presented with back pain, had a CAT scan done that  showed multiple vertebral body fractures with lytic lesions. He had a T11  biopsy done that came back with plasmocytosis compatible with multiple myeloma.  The patient was referred to me on 12/03/2013. I did a bone marrow biopsy that  came back consistent with multiple myeloma. Cytogenics at this point are still  pending. Whole body bone survey showed multiple bony lytic lesions affecting  right humerus, vertebral bodies, as well as pelvic bones. The patient had  serum protein electrophoresis done at diagnosis that showed monoclonal protein  of 2.5 grams plus 0.5 grams, kappa restricted. 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 The  patient was started on Revlimid 1.2 mg/sq m subcu once weekly 2 weeks on and 1  week off, Velcade 25 mg p.o. daily 2 weeks on and 1 week off, and dexamethasone  40 mg p.o. weekly on 12/09/2013. The patient completed cycle #6 on 04/07/2014.  The patient received autologous stem cell transplant with Dr. Bry Johnson at  [email or web address] on 05/13/2014. His day 100 was 08/26/2014. He had serum  protein electrophoresis that failed to show any evidence of monoclonal protein,  serum free light chain ratio was slightly elevated. The patient was started on  Revlimid 15 mg p.o. daily 2 weeks on and 1 week off on 06/16/2015. The patient  is here today in scheduled followup visit.      SUBJECTIVE: The patient is doing fairly well. He denied any headaches, denied any night sweats. He is complaining of mild fatigue. No fever or chills, no more double vision.   He continues to complain of numbness in his feet worse on the left side gabapentin is helping to some degree.     REVIEW OF SYSTEMS: All the other 9 systems are reviewed by me and negative  except what is mentioned in HPI.      PAST MEDICAL HISTORY/SOCIAL HISTORY/FAMILY HISTORY: Unchanged from my prior  documentation done on 12/03/2013.       Current Outpatient Prescriptions:   •  aspirin 81 MG tablet, Take 81 mg by mouth Daily., Disp: , Rfl:   •  azelastine (ASTELIN) 0.1 % nasal spray, into each nostril 2 (two) times a day., Disp: , Rfl:   •  Calcium Carb-Cholecalciferol (CALCIUM + D3) 600-200 MG-UNIT tablet, Take  by mouth., Disp: , Rfl:   •  divalproex (DEPAKOTE) 500 MG 24 hr tablet, Take 2 tablets by mouth Daily., Disp: 90 tablet, Rfl: 5  •  docusate sodium (DOCQLACE) 100 MG capsule, Take  by mouth., Disp: , Rfl:   •  folic acid (FOLVITE) 1 MG tablet, TK 1 T PO QD, Disp: , Rfl: 5  •  gabapentin (NEURONTIN) 300 MG capsule, Take 300 mg by mouth 3 (Three) Times a Day., Disp: , Rfl:   •  Glucosamine-Chondroitin (OSTEO BI-FLEX REGULAR STRENGTH) 250-200 MG tablet, Take 1 tablet by mouth 2 (Two) Times a Day., Disp: , Rfl:   •  lenalidomide (REVLIMID) 25 MG capsule, Take 1 capsule by mouth Daily. 1 daily for 14 days, 7 days off. Auth : [id number], Disp: 14 capsule, Rfl: 0  •  levETIRAcetam (KEPPRA) 1000 MG tablet, Take 1 tablet by mouth Daily. PATIENT TAKING 1.5 TAB TWICE A DAY (Patient taking differently: Take 1,000 mg by mouth Daily. PATIENT TAKING 1/2 TAB TWICE A DAY), Disp: 90 tablet, Rfl: 6  •  loratadine (CLARITIN) 10 MG tablet, Take  by mouth., Disp: , Rfl:   •  methylphenidate (CONCERTA) 36 MG CR tablet, , Disp: , Rfl:   •  pantoprazole (PROTONIX) 40 MG EC tablet, TAKE 1 TABLET BY MOUTH EVERY DAY, Disp: 30 tablet, Rfl: 5  •  Probiotic Product (PROBIOTIC PO), Take 1 tablet by mouth 2 (Two) Times a Day As Needed., Disp: , Rfl:   •  Valerian Root 450 MG capsule, Take  by mouth., Disp: , Rfl:   •  vitamin B-12  "(CYANOCOBALAMIN) 500 MCG tablet, Take 500 mcg by mouth Daily., Disp: , Rfl:   •  warfarin (COUMADIN) 5 MG tablet, Take 5 mg by mouth As Needed (Tuesday 7.5mg   M W R F S S = 5mg)., Disp: , Rfl:     PHYSICAL EXAMINATION:   Visit Vitals   • /74   • Pulse 73   • Temp 97.9 °F (36.6 °C)   • Resp 18   • Ht 70\" (177.8 cm)   • Wt 264 lb (120 kg)   • BMI 37.88 kg/m2     ECOG1  GENERAL: Age appropriate. No acute distress.   HEENT: Head atraumatic, normocephalic.   NECK: Supple. No JVD. No lymphadenopathy.   LUNGS: Clear to auscultation bilaterally. No wheezing. No rhonchi.   HEART: Regular rate and rhythm. S1, S2, no murmurs.   ABDOMEN: Soft, nontender, nondistended. Bowel sounds positive. No  hepatosplenomegaly.   EXTREMITIES: No clubbing, cyanosis, or edema.   SKIN: No rashes. No purpura.   NEUROLOGIC: Awake and oriented x3. Strength 5 out of 5 in all muscle groups.     Lab on 02/17/2017   Component Date Value Ref Range Status   • Glucose 02/17/2017 86  70 - 100 mg/dL Final   • BUN 02/17/2017 19  9 - 23 mg/dL Final   • Creatinine 02/17/2017 0.80  0.60 - 1.30 mg/dL Final   • Sodium 02/17/2017 141  132 - 146 mmol/L Final   • Potassium 02/17/2017 4.5  3.5 - 5.5 mmol/L Final   • Chloride 02/17/2017 106  99 - 109 mmol/L Final   • CO2 02/17/2017 27.0  20.0 - 31.0 mmol/L Final   • Calcium 02/17/2017 9.5  8.7 - 10.4 mg/dL Final   • eGFR Non  Amer 02/17/2017 96  >60 mL/min/1.73 Final   • BUN/Creatinine Ratio 02/17/2017 23.8  7.0 - 25.0 Final   • Anion Gap 02/17/2017 8.0  3.0 - 11.0 mmol/L Final   • Total Protein 02/17/2017 6.1  6.0 - 8.5 g/dL Final   • Albumin 02/17/2017 3.5  2.9 - 4.4 g/dL Final   • Alpha-1-Globulin 02/17/2017 0.3  0.0 - 0.4 g/dL Final   • Alpha-2-Globulin 02/17/2017 0.5  0.4 - 1.0 g/dL Final   • Beta Globulin 02/17/2017 1.0  0.7 - 1.3 g/dL Final   • Gamma Globulin 02/17/2017 0.8  0.4 - 1.8 g/dL Final   • M-Matt 02/17/2017 Not Observed  Not Observed g/dL Final   • Globulin 02/17/2017 2.6  2.2 - 3.9 " g/dL Final   • A/G Ratio 02/17/2017 1.3  0.7 - 1.7 Final   • Please note 02/17/2017 Comment   Final    Protein electrophoresis scan will follow via computer, mail, or   delivery.   • Free Light Chain, Kappa 02/17/2017 635.66* 3.30 - 19.40 mg/L Final   • Free Lambda Light Chains 02/17/2017 16.15  5.71 - 26.30 mg/L Final   • Kappa/Lambda Ratio 02/17/2017 39.36* 0.26 - 1.65 Final   • WBC 02/17/2017 2.70* 3.50 - 10.80 10*3/mm3 Final   • RBC 02/17/2017 4.27  4.20 - 5.76 10*6/mm3 Final   • Hemoglobin 02/17/2017 13.3  13.1 - 17.5 g/dL Final   • Hematocrit 02/17/2017 41.5  38.9 - 50.9 % Final   • RDW 02/17/2017 16.1* 11.3 - 14.5 % Final   • MCV 02/17/2017 97.2  80.0 - 99.0 fL Final   • MCH 02/17/2017 31.1* 27.0 - 31.0 pg Final   • MCHC 02/17/2017 32.0  32.0 - 36.0 g/dL Final   • MPV 02/17/2017 7.9  6.0 - 12.0 fL Final   • Platelets 02/17/2017 91* 150 - 450 10*3/mm3 Final    Verified by repeat analysis.    • Neutrophil % 02/17/2017 47.7  41.0 - 71.0 % Final   • Lymphocyte % 02/17/2017 46.3* 24.0 - 44.0 % Final   • Monocyte % 02/17/2017 6.0  0.0 - 12.0 % Final   • Neutrophils, Absolute 02/17/2017 1.30* 1.50 - 8.30 10*3/mm3 Final   • Lymphocytes, Absolute 02/17/2017 1.30  0.60 - 4.80 10*3/mm3 Final   • Monocytes, Absolute 02/17/2017 0.20  0.00 - 1.00 10*3/mm3 Final        [place] Medicare Screening    Result Date: 8/12/2016  Narrative:  EXAMINATION: [place] MEDICARE SCREENING- 08/12/2016   INDICATION: Z87.891-Personal history of nicotine dependence; history of tobacco use.   TECHNIQUE: Multiple images through the retroperitoneum were obtained with a 4 MHz probe.   COMPARISON: NONE   FINDINGS:  Proximal aorta: 2.9 x 3 x 3.1 cm. Mid aorta: 2.3 x 2.3 x 2.4 cm. Distal aorta: 2 x 2.1 x 2.1 cm. Right iliac: 1.4 x 1.2 x 1.3 cm. Left iliac: 1.2 x 1.3 x 1.4 cm.   There is some echogenic material in the posterior wall of the distal aorta which may represent thrombus.      Impression:  1. Minimal ectasia of proximal aorta. 2.  Suggestion of thrombus in the distal aorta.   D:  08/12/2016 E:  08/12/2016      This report was finalized on 8/12/2016 4:22 PM by Dr. Andrew Ambriz MD.        ASSESSMENT: The patient is a very pleasant 70-year-old gentleman with new  diagnosis of multiple myeloma.     PROBLEM LIST:   1. Multiple myeloma, kappa restricted, M-spike 2.4 at diagnosis, ISS score  stage 2 with normal albumin but beta-2 microglobulin level of 4.1.   2. 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   3. Completed 6 cycles of Revlimid, Velcade, and dexamethasone from 12/09/2013  until 04/07/2014.  4. Received palliative radiation to T11, 4 treatments, completed 12/11/2013.   5. Status post autologous stem cell transplant done at [facility]  with Dr. Bry Johnson 05/13/2014.  6. Relapsed disease documented with elevated kappa lambda ratio with minute  amount of monoclonal protein done on 06/09/2015.   7. Bilateral pulmonary embolism, deep venous thrombosis 07/2014 on chronic  anticoagulation with Coumadin.  8. Chronic kidney disease Stage II.   9. 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   10. Started Revlimid 25 mg p.o. daily 2 weeks on and 1 week off on 06/16/2015.  11. Switched to Ninlaro on 02/21/2017  Secondary to increase in SFL ratio.  12. Partial seizure disorder.   13. Leukopenia and thrombocytopenia.   14.  Peripheral neuropathy     PLAN:  1. I did go over the results in detail with the patient. I explained to him  that he still does not have any evidence of monoclonal protein; however, his  Kappa lambda light chain ratio is steadily increasing, it went up from 25 on 11/18/2016 to 39 on 02/17/2017.   2. I will stop Revlimid 25 mg p.o. daily, 2 weeks on and 1 week off at this point. I will switch him to Ninlaro 4 mg weekly 3 weeks on and 1 week off.   3. The patient will come back to see me in 1  months with repeat blood work  prior to return, including serum free light chains.   4. I will order bone density prior to return.  I will consider starting Zometa 4 mg every 4 weeks.  5. We will continue folic acid daily. I am suspecting his  leukopenia and thrombocytopenia is secondary to Depakote and Keppra.   6. I will continue warfarin for history of DVT with monoclonal gammopathy.   7.  We'll continue gabapentin for neuropathy I told the patient to ask his neurologist to see if he could be switched to Lyrica, that might help with his worsening numbness in his feet.  8.  We did go over the potential side effects of Ninlaro including but not limited to peripheral neuropathy, edema, rash, diarrhea, low blood counts, and hepatic insufficiency.   9.  The patient is scheduled to follow-up with Dr. Reyes at [facility] in May 2017.  Patient might be a candidate for a second autologous stem cell transplant.    Rosalie Segovia MD  2/21/2017

## 2017-03-01 ENCOUNTER — TELEPHONE (OUTPATIENT)
Dept: ONCOLOGY | Facility: CLINIC | Age: 72
End: 2017-03-01

## 2017-03-01 DIAGNOSIS — C90.02 MULTIPLE MYELOMA IN RELAPSE (HCC): Primary | ICD-10-CM

## 2017-03-01 NOTE — TELEPHONE ENCOUNTER
Patient notified Ninlaro will be coming from [employer] with a $60 copay.  This is acceptable for patient.  Karrie will escribe Naseem to [employer].      Wed 3/1/2017 8:35 AM  '[email or web address]'    Ana Constantino    Re; Johnny Londonoory : 49    Ana Constantino,     Will you look for the above patients new Ninlaro prescription?  He is an established patient with you that has received Revlimid in the past.  Enclosed is his demographics and insurance cards.  Please let me know if anything else is needed.    Appreciate all your help.    Thanks,  Mariam Jaramillo CMA ([facility])

## 2017-03-03 ENCOUNTER — CLINICAL SUPPORT (OUTPATIENT)
Dept: ONCOLOGY | Facility: HOSPITAL | Age: 72
End: 2017-03-03

## 2017-03-03 NOTE — PLAN OF CARE
Oral Chemotherapy Teaching      Patient Name/:  Johnny Ramirez   1945  Oral Chemotherapy Regimen:  Ixazomib 4mg PO weekly Days 1, 8, 15 every 28 days  Date Started Medication: 3/6/2017    Initial Teaching Follow Up Comments     Safety     Storage instructions (away from children; away from heat/cold, sunlight, or moisture), handling - use of gloves (caregivers), washing hands after touching pills, managing waste     “How are you storing your medications?”, reminders on storage, proper handling (caregivers using gloves, washing hands, away from children, managing waste, etc.), disposal of medication with D/C or dosage change    Discussed proper storage of medication- dry, cool area.  Away from children and pets.  Discussed proper handling precautions- pt wash hands and other need to wear disposal gloves and hand wash. Discussed keeping pills in the original container until time to use.  Provided patient with a handout on how to open the medication.     Adherence      patient and/or caregiver on how to take medication, take with/without food, assess their adherence potential, stress importance of adherence, ways to manage adherence (pill boxes, phone reminders, calendars), what to do if miss a dose   “How are you taking your medication?” “How are you remembering to take your medication?”, “How many doses have you missed?”, determine reasons for non-adherence (not remembering, side effects, etc), ways to improve, overadherence? Remind patient of ways to improve/maintain adherence   Stressed importance of adherence.  Discussed what to do if he misses a pill- he can take the missed pill unless it is 72 hours without the next dose then skip that dose.  Discussed taking on an empty stomach (1 hour prior or 2 hours after a meal).  Recommended taking first thing in the AM or at bedtime.  Provided patient with personalized calendar so he can check off each dose if he wishes.     Side Effects/Adverse Reactions       patient on potential side effects, s/s, ways to manage, when to call MD/seek help     Determine if patient experiencing side effects, ways to manage  Discussed most common ADRs: low blood counts, infection risk, constipation, diarrhea, edema, peripheral neuropathy, skin rash, URTI, blurred vision, dry eye, conjunctivitis, nausea.  Advised pt to call MD if fever >100.4.  He has been experiencing some constipation due to lenalidomide.  He takes a stool softener daily.  Discussed continuing that until he sees how he reacts to this medication.  Advised to elevate legs with edema occurs.  Peripheral neuropathy is a big concern for this patient.  He is currently taking gabapentin and he is going to see a specialists about his PN shortly at .   Pt has had some itching on his left upper arm- I looked at this area and do not see a rash.  I suggested it might be dry skin- recommended Aveeno twice daily to help.  Discussed notifying us if a cough develops.  Long discussion about eye disorders and specifically what is conjunctivitis.  Patient has Zofran at home to take PRN for N/V.  Advised patient to wear a condom while on treatment and for at least 3 months after treatment is complete.  Patient does want to talk to someone about exercise options for himself - he is having difficulty due to severe PN in feet.  I will mention this to the dietician to see if she has any suggestions for us.       Miscellaneous     Food interactions, DDIs, financial issues Determine if patient started any new medications since being placed on oral chemo (analyze for DDI) No DDI to discuss.  Patient's copay is $60/month.  Provided patient with [employer] phone number to set up delivery.     Additional Notes:  Provided patient with chemo packet, PO chemo booklet, ixazomib education sheet, personalized calendar, introductions on how to open pill container, and my business card.  Advised pt to call me with any questions/concerns.  I will  F/U with patient about 4 days after he takes his first pill.

## 2017-03-06 ENCOUNTER — TELEPHONE (OUTPATIENT)
Dept: NUTRITION | Facility: HOSPITAL | Age: 72
End: 2017-03-06

## 2017-03-06 NOTE — PROGRESS NOTES
Outpatient Oncology Nutrition Services    Patient Name:  Johnny Ramirez  YOB: 1945  MRN: [id number]  Admit Date:  (Not on file)    Referral received from PharmROHAN, Karrie Shankar, regarding patient wanting to start an exercise regimen.  Patient's chart briefly reviewed and spoke with patient via phone call today.  Discussed partnership with [employer] for [employer] cancer survivors and he is interested in the program.  Written material with [employer] information mailed to patient.  He voiced understanding of information discussed and he denies other nutritional questions at this time.  Encouraged him to RD if questions arise.  RD available to assist prn.  Thanks,    Electronically signed by:  Clara Fairchild RD  03/06/17 2:31 PM

## 2017-03-10 ENCOUNTER — EDUCATION (OUTPATIENT)
Dept: ONCOLOGY | Facility: HOSPITAL | Age: 72
End: 2017-03-10

## 2017-03-10 NOTE — PLAN OF CARE
Oral Chemotherapy Teaching      Patient Name/:  Johnny Ramirez   1945  Oral Chemotherapy Regimen:  Ixazomib 4mg PO Days 1, 8, 15 every 28 days  Date Started Medication: 3/6/2017 (today is Day 5)    Initial Teaching Follow Up Comments     Safety     Storage instructions (away from children; away from heat/cold, sunlight, or moisture), handling - use of gloves (caregivers), washing hands after touching pills, managing waste     “How are you storing your medications?”, reminders on storage, proper handling (caregivers using gloves, washing hands, away from children, managing waste, etc.), disposal of medication with D/C or dosage change    Patient is storing med in safe, dry area.     Adherence      patient and/or caregiver on how to take medication, take with/without food, assess their adherence potential, stress importance of adherence, ways to manage adherence (pill boxes, phone reminders, calendars), what to do if miss a dose   “How are you taking your medication?” “How are you remembering to take your medication?”, “How many doses have you missed?”, determine reasons for non-adherence (not remembering, side effects, etc), ways to improve, overadherence? Remind patient of ways to improve/maintain adherence   Patient took his first dose in the evening on an empty stomach.     Side Effects/Adverse Reactions      patient on potential side effects, s/s, ways to manage, when to call MD/seek help     Determine if patient experiencing side effects, ways to manage  No side effects to date.  The only complaint is fatigue but he recently increased his gabapentin dosage per neurologist at  this week.  He is going to start PT and start an exercise program.  His constipation has resolved but he is continuing a daily stool softener.       Miscellaneous     Food interactions, DDIs, financial issues Determine if patient started any new medications since being placed on oral chemo (analyze for DDI) No new  medications to discuss.     Additional Notes:  I will F/U with patient next week.  Advised him to call me if he needs before then.

## 2017-03-16 ENCOUNTER — EDUCATION (OUTPATIENT)
Dept: ONCOLOGY | Facility: HOSPITAL | Age: 72
End: 2017-03-16

## 2017-03-16 NOTE — PLAN OF CARE
Oral Chemotherapy Teaching      Patient Name/:  Johnny Ramirez   1945  Oral Chemotherapy Regimen:  Ixazomib 4mg weekly Days 1, 8, 15 every 28 days  Date Started Medication: 3/6/2017 (Today Day 11)    Initial Teaching Follow Up Comments     Safety     Storage instructions (away from children; away from heat/cold, sunlight, or moisture), handling - use of gloves (caregivers), washing hands after touching pills, managing waste     “How are you storing your medications?”, reminders on storage, proper handling (caregivers using gloves, washing hands, away from children, managing waste, etc.), disposal of medication with D/C or dosage change         Adherence      patient and/or caregiver on how to take medication, take with/without food, assess their adherence potential, stress importance of adherence, ways to manage adherence (pill boxes, phone reminders, calendars), what to do if miss a dose   “How are you taking your medication?” “How are you remembering to take your medication?”, “How many doses have you missed?”, determine reasons for non-adherence (not remembering, side effects, etc), ways to improve, overadherence? Remind patient of ways to improve/maintain adherence   Patient has taken both doses on time.     Side Effects/Adverse Reactions      patient on potential side effects, s/s, ways to manage, when to call MD/seek help     Determine if patient experiencing side effects, ways to manage  Only complaint is fatigue.  He feels like the fatigue is worse with ixazomib versus the lenalidomide previously.  He still has itchy upper arms but he hasn't bought any moistuzier yet.  Patient is overall pleased with how he has reacted to the ixazomib.  No N/V, D/C, or eye changes to discuss.     Miscellaneous     Food interactions, DDIs, financial issues Determine if patient started any new medications since being placed on oral chemo (analyze for DDI)      Additional Notes:  I will F/U with patient  next week in clinic.

## 2017-03-17 ENCOUNTER — LAB (OUTPATIENT)
Dept: LAB | Facility: HOSPITAL | Age: 72
End: 2017-03-17
Attending: INTERNAL MEDICINE

## 2017-03-17 DIAGNOSIS — C90.02 MULTIPLE MYELOMA IN RELAPSE (HCC): ICD-10-CM

## 2017-03-17 LAB
ALBUMIN SERPL-MCNC: 4 G/DL (ref 3.2–4.8)
ALBUMIN/GLOB SERPL: 1.7 G/DL (ref 1.5–2.5)
ALP SERPL-CCNC: 73 U/L (ref 25–100)
ALT SERPL W P-5'-P-CCNC: 26 U/L (ref 7–40)
ANION GAP SERPL CALCULATED.3IONS-SCNC: 1 MMOL/L (ref 3–11)
AST SERPL-CCNC: 31 U/L (ref 0–33)
BILIRUB SERPL-MCNC: 0.4 MG/DL (ref 0.3–1.2)
BUN BLD-MCNC: 22 MG/DL (ref 9–23)
BUN/CREAT SERPL: 27.5 (ref 7–25)
CALCIUM SPEC-SCNC: 9.5 MG/DL (ref 8.7–10.4)
CHLORIDE SERPL-SCNC: 110 MMOL/L (ref 99–109)
CO2 SERPL-SCNC: 33 MMOL/L (ref 20–31)
CREAT BLD-MCNC: 0.8 MG/DL (ref 0.6–1.3)
ERYTHROCYTE [DISTWIDTH] IN BLOOD BY AUTOMATED COUNT: 15.8 % (ref 11.3–14.5)
GFR SERPL CREATININE-BSD FRML MDRD: 96 ML/MIN/1.73
GLOBULIN UR ELPH-MCNC: 2.3 GM/DL
GLUCOSE BLD-MCNC: 103 MG/DL (ref 70–100)
HCT VFR BLD AUTO: 39.9 % (ref 38.9–50.9)
HGB BLD-MCNC: 12.8 G/DL (ref 13.1–17.5)
LYMPHOCYTES # BLD AUTO: 1.2 10*3/MM3 (ref 0.6–4.8)
LYMPHOCYTES NFR BLD AUTO: 43 % (ref 24–44)
MCH RBC QN AUTO: 31.4 PG (ref 27–31)
MCHC RBC AUTO-ENTMCNC: 32.1 G/DL (ref 32–36)
MCV RBC AUTO: 97.7 FL (ref 80–99)
MONOCYTES # BLD AUTO: 0.2 10*3/MM3 (ref 0–1)
MONOCYTES NFR BLD AUTO: 8.3 % (ref 0–12)
NEUTROPHILS # BLD AUTO: 1.4 10*3/MM3 (ref 1.5–8.3)
NEUTROPHILS NFR BLD AUTO: 48.7 % (ref 41–71)
PLATELET # BLD AUTO: 109 10*3/MM3 (ref 150–450)
PMV BLD AUTO: 8.5 FL (ref 6–12)
POTASSIUM BLD-SCNC: 4.7 MMOL/L (ref 3.5–5.5)
PROT SERPL-MCNC: 6.3 G/DL (ref 5.7–8.2)
RBC # BLD AUTO: 4.08 10*6/MM3 (ref 4.2–5.76)
SODIUM BLD-SCNC: 144 MMOL/L (ref 132–146)
WBC NRBC COR # BLD: 2.8 10*3/MM3 (ref 3.5–10.8)

## 2017-03-17 PROCEDURE — 80053 COMPREHEN METABOLIC PANEL: CPT | Performed by: INTERNAL MEDICINE

## 2017-03-17 PROCEDURE — 85025 COMPLETE CBC W/AUTO DIFF WBC: CPT

## 2017-03-17 PROCEDURE — 36415 COLL VENOUS BLD VENIPUNCTURE: CPT

## 2017-03-17 PROCEDURE — 83883 ASSAY NEPHELOMETRY NOT SPEC: CPT | Performed by: INTERNAL MEDICINE

## 2017-03-19 LAB
KAPPA LC SERPL-MCNC: 874.15 MG/L (ref 3.3–19.4)
KAPPA LC/LAMBDA SER: 73.52 {RATIO} (ref 0.26–1.65)
LAMBDA LC FREE SERPL-MCNC: 11.89 MG/L (ref 5.71–26.3)

## 2017-03-21 ENCOUNTER — OFFICE VISIT (OUTPATIENT)
Dept: ONCOLOGY | Facility: CLINIC | Age: 72
End: 2017-03-21

## 2017-03-21 VITALS
BODY MASS INDEX: 38.37 KG/M2 | DIASTOLIC BLOOD PRESSURE: 75 MMHG | TEMPERATURE: 97.9 F | HEART RATE: 76 BPM | HEIGHT: 70 IN | SYSTOLIC BLOOD PRESSURE: 140 MMHG | RESPIRATION RATE: 18 BRPM | WEIGHT: 268 LBS

## 2017-03-21 DIAGNOSIS — C90.02 MULTIPLE MYELOMA IN RELAPSE (HCC): Primary | ICD-10-CM

## 2017-03-21 PROCEDURE — 99214 OFFICE O/P EST MOD 30 MIN: CPT | Performed by: INTERNAL MEDICINE

## 2017-03-21 NOTE — PROGRESS NOTES
DATE OF VISIT: 3/21/2017    REASON FOR VISIT: Followup for multiple myeloma, M-spike at diagnosis 2.4,  kappa restricted, with diffuse lytic lesions.      HISTORY OF PRESENT ILLNESS: The patient is a very pleasant 70-year-old  gentleman with past medical history significant for multiple myeloma diagnosed  11/26/2013. The patient presented with back pain, had a CAT scan done that  showed multiple vertebral body fractures with lytic lesions. He had a T11  biopsy done that came back with plasmocytosis compatible with multiple myeloma.  The patient was referred to me on 12/03/2013. I did a bone marrow biopsy that  came back consistent with multiple myeloma. Cytogenics at this point are still  pending. Whole body bone survey showed multiple bony lytic lesions affecting  right humerus, vertebral bodies, as well as pelvic bones. The patient had  serum protein electrophoresis done at diagnosis that showed monoclonal protein  of 2.5 grams plus 0.5 grams, kappa restricted. 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 The  patient was started on Revlimid 1.2 mg/sq m subcu once weekly 2 weeks on and 1  week off, Velcade 25 mg p.o. daily 2 weeks on and 1 week off, and dexamethasone  40 mg p.o. weekly on 12/09/2013. The patient completed cycle #6 on 04/07/2014.  The patient received autologous stem cell transplant with Dr. Bry Johnson at  [facility] on 05/13/2014. His day 100 was 08/26/2014. He had serum  protein electrophoresis that failed to show any evidence of monoclonal protein,  serum free light chain ratio was slightly elevated. The patient was started on  Revlimid 15 mg p.o. daily 2 weeks on and 1 week off on 06/16/2015. The patient's therapy was changed to Ninlaro 4 mg weekly 3 weeks on and 1 week off due to rising kappa lambda ratio.  The patient is here today in scheduled followup visit.    SUBJECTIVE: The patient has been doing fairly well. he was  able to tolerate  his treatment without any serious side effects. he denied any fever or  chills, no night sweats, denied any headaches    PAST MEDICAL HISTORY/SOCIAL HISTORY/FAMILY HISTORY: Unchanged from my prior documentation done on 12/03/2013.    Review of Systems   Constitutional: Positive for fatigue. Negative for activity change, appetite change, chills, fever and unexpected weight change.   HENT: Negative for hearing loss, mouth sores, nosebleeds, sore throat and trouble swallowing.    Eyes: Negative for visual disturbance.   Respiratory: Negative for cough, chest tightness, shortness of breath and wheezing.    Cardiovascular: Negative for chest pain, palpitations and leg swelling.   Gastrointestinal: Negative for abdominal distention, abdominal pain, blood in stool, constipation, diarrhea, nausea, rectal pain and vomiting.   Endocrine: Negative for cold intolerance and heat intolerance.   Genitourinary: Negative for difficulty urinating, dysuria, frequency and urgency.   Musculoskeletal: Positive for arthralgias. Negative for back pain, gait problem, joint swelling and myalgias.   Skin: Negative for rash.   Neurological: Positive for numbness. Negative for dizziness, tremors, syncope, weakness, light-headedness and headaches.   Hematological: Negative for adenopathy. Does not bruise/bleed easily.   Psychiatric/Behavioral: Negative for confusion, sleep disturbance and suicidal ideas. The patient is not nervous/anxious.          Current Outpatient Prescriptions:   •  lenalidomide (REVLIMID) 25 MG capsule, Take 1 capsule by mouth Daily. 1 daily for 14 days, 7 days off. Auth : [id number], Disp: 14 capsule, Rfl: 0  •  aspirin 81 MG tablet, Take 81 mg by mouth Daily., Disp: , Rfl:   •  azelastine (ASTELIN) 0.1 % nasal spray, into each nostril 2 (two) times a day., Disp: , Rfl:   •  Calcium Carb-Cholecalciferol (CALCIUM + D3) 600-200 MG-UNIT tablet, Take  by mouth., Disp: , Rfl:   •  divalproex (DEPAKOTE) 500 MG 24  "hr tablet, Take 2 tablets by mouth Daily., Disp: 90 tablet, Rfl: 5  •  docusate sodium (DOCQLACE) 100 MG capsule, Take  by mouth., Disp: , Rfl:   •  folic acid (FOLVITE) 1 MG tablet, TK 1 T PO QD, Disp: , Rfl: 5  •  gabapentin (NEURONTIN) 300 MG capsule, Take 300 mg by mouth 3 (Three) Times a Day., Disp: , Rfl:   •  Glucosamine-Chondroitin (OSTEO BI-FLEX REGULAR STRENGTH) 250-200 MG tablet, Take 1 tablet by mouth 2 (Two) Times a Day., Disp: , Rfl:   •  levETIRAcetam (KEPPRA) 1000 MG tablet, Take 1 tablet by mouth Daily. PATIENT TAKING 1.5 TAB TWICE A DAY (Patient taking differently: Take 500 mg by mouth 2 (Two) Times a Day. PATIENT TAKING 1/2 TAB TWICE A DAY), Disp: 90 tablet, Rfl: 6  •  loratadine (CLARITIN) 10 MG tablet, Take  by mouth., Disp: , Rfl:   •  methylphenidate (CONCERTA) 36 MG CR tablet, , Disp: , Rfl:   •  pantoprazole (PROTONIX) 40 MG EC tablet, TAKE 1 TABLET BY MOUTH EVERY DAY, Disp: 30 tablet, Rfl: 5  •  Probiotic Product (PROBIOTIC PO), Take 1 tablet by mouth 2 (Two) Times a Day As Needed., Disp: , Rfl:   •  Valerian Root 450 MG capsule, Take  by mouth., Disp: , Rfl:   •  vitamin B-12 (CYANOCOBALAMIN) 500 MCG tablet, Take 500 mcg by mouth Daily., Disp: , Rfl:   •  warfarin (COUMADIN) 5 MG tablet, Take 5 mg by mouth As Needed., Disp: , Rfl:     PHYSICAL EXAMINATION:   Visit Vitals   • /75   • Pulse 76   • Temp 97.9 °F (36.6 °C)   • Resp 18   • Ht 70\" (177.8 cm)   • Wt 268 lb (122 kg)   • BMI 38.45 kg/m2      ECOG Performance Status: 1 - Symptomatic but completely ambulatory  General Appearance:  alert, cooperative, no apparent distress and appears stated age   Neurologic/Psychiatric: A&O x 3, gait steady, appropriate affect, strength 5/5 in all muscle groups   HEENT:  Normocephalic, without obvious abnormality, mucous membranes moist   Neck: Supple, symmetrical, trachea midline, no adenopathy;  No thyromegaly, masses, or tenderness   Lungs:   Clear to auscultation bilaterally; respirations " regular, even, and unlabored bilaterally   Heart:  Regular rate and rhythm, no murmurs appreciated   Abdomen:   Soft, non-tender, non-distended and no organomegaly   Lymph nodes: No cervical, supraclavicular, inguinal or axillary adenopathy noted   Extremities: Normal, atraumatic; no clubbing, cyanosis, or edema    Skin: No rashes, ulcers, or suspicious lesions noted     Lab on 03/17/2017   Component Date Value Ref Range Status   • Glucose 03/17/2017 103* 70 - 100 mg/dL Final   • BUN 03/17/2017 22  9 - 23 mg/dL Final   • Creatinine 03/17/2017 0.80  0.60 - 1.30 mg/dL Final   • Sodium 03/17/2017 144  132 - 146 mmol/L Final   • Potassium 03/17/2017 4.7  3.5 - 5.5 mmol/L Final   • Chloride 03/17/2017 110* 99 - 109 mmol/L Final   • CO2 03/17/2017 33.0* 20.0 - 31.0 mmol/L Final   • Calcium 03/17/2017 9.5  8.7 - 10.4 mg/dL Final   • Total Protein 03/17/2017 6.3  5.7 - 8.2 g/dL Final   • Albumin 03/17/2017 4.00  3.20 - 4.80 g/dL Final   • ALT (SGPT) 03/17/2017 26  7 - 40 U/L Final   • AST (SGOT) 03/17/2017 31  0 - 33 U/L Final   • Alkaline Phosphatase 03/17/2017 73  25 - 100 U/L Final   • Total Bilirubin 03/17/2017 0.4  0.3 - 1.2 mg/dL Final   • eGFR Non African Amer 03/17/2017 96  >60 mL/min/1.73 Final   • Globulin 03/17/2017 2.3  gm/dL Final   • A/G Ratio 03/17/2017 1.7  1.5 - 2.5 g/dL Final   • BUN/Creatinine Ratio 03/17/2017 27.5* 7.0 - 25.0 Final   • Anion Gap 03/17/2017 1.0* 3.0 - 11.0 mmol/L Final   • Free Light Chain, Kappa 03/17/2017 874.15* 3.30 - 19.40 mg/L Final   • Free Lambda Light Chains 03/17/2017 11.89  5.71 - 26.30 mg/L Final   • Kappa/Lambda Ratio 03/17/2017 73.52* 0.26 - 1.65 Final   • WBC 03/17/2017 2.80* 3.50 - 10.80 10*3/mm3 Final   • RBC 03/17/2017 4.08* 4.20 - 5.76 10*6/mm3 Final   • Hemoglobin 03/17/2017 12.8* 13.1 - 17.5 g/dL Final   • Hematocrit 03/17/2017 39.9  38.9 - 50.9 % Final   • RDW 03/17/2017 15.8* 11.3 - 14.5 % Final   • MCV 03/17/2017 97.7  80.0 - 99.0 fL Final   • MCH 03/17/2017 31.4*  27.0 - 31.0 pg Final   • MCHC 03/17/2017 32.1  32.0 - 36.0 g/dL Final   • MPV 03/17/2017 8.5  6.0 - 12.0 fL Final   • Platelets 03/17/2017 109* 150 - 450 10*3/mm3 Final    Verified by repeat analysis.    • Neutrophil % 03/17/2017 48.7  41.0 - 71.0 % Final   • Lymphocyte % 03/17/2017 43.0  24.0 - 44.0 % Final   • Monocyte % 03/17/2017 8.3  0.0 - 12.0 % Final   • Neutrophils, Absolute 03/17/2017 1.40* 1.50 - 8.30 10*3/mm3 Final   • Lymphocytes, Absolute 03/17/2017 1.20  0.60 - 4.80 10*3/mm3 Final   • Monocytes, Absolute 03/17/2017 0.20  0.00 - 1.00 10*3/mm3 Final        Xr Bone Survey Complete    Result Date: 2/22/2017  Narrative: EXAMINATION: XR BONE SURVEY, COMPLETE-02/21/2017:  INDICATION: Follow-up scan, restage; C90.02-Multiple myeloma in relapse.   COMPARISON: 06/09/2015.  FINDINGS: The heart size is normal. There is some atelectasis in the lingula. The remainder of the lungs are free of opacities. There is some degenerative change in the lower thoracic spine, stable since 06/09/2015. The bony calvarium is intact. The humeri are grossly unremarkable. The pelvic ring is intact. There is minimal lucency in the left femoral head at the head and neck junction. Minimal areas of lucency are present in the proximal portions of the femurs. Degenerative changes are present throughout the lumbar spine. A few areas of lucency are present in the left femoral diaphysis; unchanged since 06/09/2015.        Impression: 1.  Minimal areas of osteolysis in the femurs, not significantly changed since 06/09/2015. 2.  No new areas of osteolysis. 3.  There are no significant areas of osteolysis in the humeri.  D:  02/21/2017 E:  02/22/2017  This report was finalized on 2/22/2017 1:03 PM by Dr. Andrew Ambriz MD.        ASSESSMENT: The patient is a very pleasant 71-year-old gentleman with new  diagnosis of multiple myeloma.    PROBLEM LIST:  1. Multiple myeloma, kappa restricted, M-spike 2.4 at diagnosis, ISS score  stage 2 with normal  albumin but beta-2 microglobulin level of 4.1.   2. 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   3. Completed 6 cycles of Revlimid, Velcade, and dexamethasone from 12/09/2013  until 04/07/2014.  4. Received palliative radiation to T11, 4 treatments, completed 12/11/2013.   5. Status post autologous stem cell transplant done at [facility]  with Dr. Bry Johnson 05/13/2014.  6. Relapsed disease documented with elevated kappa lambda ratio with minute  amount of monoclonal protein done on 06/09/2015.   7. Bilateral pulmonary embolism, deep venous thrombosis 07/2014 on chronic  anticoagulation with Coumadin.  8. Chronic kidney disease Stage II.   9. 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   10. Started Revlimid 25 mg p.o. daily 2 weeks on and 1 week off on 06/16/2015.  11. Switched to Ninlaro on 02/21/2017 Secondary to increase in SFL ratio.  12. Partial seizure disorder.   13. Leukopenia and thrombocytopenia.   14. Peripheral neuropathy    PLAN:  1. I did go over the results in detail with the patient. I explained to him  that he still does not have any evidence of monoclonal protein; however, his  Kappa lambda light chain ratio is steadily increasing, it went up from 25 on 11/18/2016 to 39 on 02/17/2017, and is now up to 73.   2. We will continue Ninlaro 4 mg weekly 3 weeks on and 1 week off.  I would add Decadron as well as Revlimid if if his serum free light chains did not improve.  3. The patient will come back to see me in 1 months with repeat blood work  prior to return, including serum free light chains.   4. I reviewed the bone survey results with the patient. I told him there are no new lytic lesions. .  5. We will continue folic acid daily. I am suspecting his  leukopenia and thrombocytopenia is secondary to Depakote and Keppra.   6. I will continue warfarin for  history of DVT with monoclonal gammopathy.   7.  The patient is still on Neurontin for peripheral neuropathy. He will follow up with his neurologist regarding management.   8.  I reviewed the potential side effects of Ninlaro including but not limited to peripheral neuropathy, edema, rash, diarrhea, low blood counts, and hepatic insufficiency.   9.  The patient is scheduled to follow-up with Dr. Reyes at [facility] in May 2017.  Patient might be a candidate for a second autologous stem cell transplant.    Scribed for Rosalie Segovia MD by CHRISTY Cabrera. 3/21/2017  1:23 PM  Rosalie Segovia MD  3/21/2017   I, Rosalie Segovia MD, personally performed the services described in this documentation as scribed by the above named individual in my presence, and it is both accurate and complete.  3/21/2017  1:45 PM

## 2017-03-22 ENCOUNTER — EDUCATION (OUTPATIENT)
Dept: ONCOLOGY | Facility: HOSPITAL | Age: 72
End: 2017-03-22

## 2017-03-22 NOTE — PLAN OF CARE
Oral Chemotherapy Teaching      Patient Name/:  Johnny Ramirez   1945  Oral Chemotherapy Regimen:  Ixazomib 4mg weekly Days 1, 8, 15 every 28 days  Date Started Medication: 3/6/2017 (today Day 17)    Initial Teaching Follow Up Comments     Safety     Storage instructions (away from children; away from heat/cold, sunlight, or moisture), handling - use of gloves (caregivers), washing hands after touching pills, managing waste     “How are you storing your medications?”, reminders on storage, proper handling (caregivers using gloves, washing hands, away from children, managing waste, etc.), disposal of medication with D/C or dosage change         Adherence      patient and/or caregiver on how to take medication, take with/without food, assess their adherence potential, stress importance of adherence, ways to manage adherence (pill boxes, phone reminders, calendars), what to do if miss a dose   “How are you taking your medication?” “How are you remembering to take your medication?”, “How many doses have you missed?”, determine reasons for non-adherence (not remembering, side effects, etc), ways to improve, overadherence? Remind patient of ways to improve/maintain adherence   Patient took all 3 pills on time on an empty stomach.     Side Effects/Adverse Reactions      patient on potential side effects, s/s, ways to manage, when to call MD/seek help     Determine if patient experiencing side effects, ways to manage  Patient had a headache one day last week but Tyenlol was able to control the pain.  No complaints with diarrhea, constipation, N/V, rash, or vision changes.     Miscellaneous     Food interactions, DDIs, financial issues Determine if patient started any new medications since being placed on oral chemo (analyze for DDI) Discussed that [facility] will call him early next week to set up delivery for cycle 2.     Additional Notes:  I will F/U with patient in 2 weeks.

## 2017-04-06 ENCOUNTER — EDUCATION (OUTPATIENT)
Dept: ONCOLOGY | Facility: HOSPITAL | Age: 72
End: 2017-04-06

## 2017-04-06 NOTE — PLAN OF CARE
Oral Chemotherapy Teaching      Patient Name/:  Johnny Ramirez   1945  Oral Chemotherapy Regimen:  Ixazomib 4mg PO Days 1, 8, 15 every 28 days  Date Started Medication: Cycle 2: 4/3/2017 (Today Day 4)    Initial Teaching Follow Up Comments     Safety     Storage instructions (away from children; away from heat/cold, sunlight, or moisture), handling - use of gloves (caregivers), washing hands after touching pills, managing waste     “How are you storing your medications?”, reminders on storage, proper handling (caregivers using gloves, washing hands, away from children, managing waste, etc.), disposal of medication with D/C or dosage change         Adherence      patient and/or caregiver on how to take medication, take with/without food, assess their adherence potential, stress importance of adherence, ways to manage adherence (pill boxes, phone reminders, calendars), what to do if miss a dose   “How are you taking your medication?” “How are you remembering to take your medication?”, “How many doses have you missed?”, determine reasons for non-adherence (not remembering, side effects, etc), ways to improve, overadherence? Remind patient of ways to improve/maintain adherence   Patient has not missed any doses.  He is taking the pills on Monday evenings at bedtime.     Side Effects/Adverse Reactions      patient on potential side effects, s/s, ways to manage, when to call MD/seek help     Determine if patient experiencing side effects, ways to manage  Patient states this has been easier than Revlimid previously.  His energy level has increased and he can do more things.  No issues with N/V, diarrhea, constipation, or vision changes.     Miscellaneous     Food interactions, DDIs, financial issues Determine if patient started any new medications since being placed on oral chemo (analyze for DDI) No new medications to discuss.     Additional Notes:  I will F/U with patient next week in clinic.  Patient knows to call with any questions/concerns before then.

## 2017-04-11 ENCOUNTER — LAB (OUTPATIENT)
Dept: LAB | Facility: HOSPITAL | Age: 72
End: 2017-04-11

## 2017-04-11 DIAGNOSIS — C90.02 MULTIPLE MYELOMA IN RELAPSE (HCC): ICD-10-CM

## 2017-04-11 LAB
ALBUMIN SERPL-MCNC: 4.1 G/DL (ref 3.2–4.8)
ALBUMIN/GLOB SERPL: 1.6 G/DL (ref 1.5–2.5)
ALP SERPL-CCNC: 63 U/L (ref 25–100)
ALT SERPL W P-5'-P-CCNC: 24 U/L (ref 7–40)
ANION GAP SERPL CALCULATED.3IONS-SCNC: 1 MMOL/L (ref 3–11)
AST SERPL-CCNC: 28 U/L (ref 0–33)
BILIRUB SERPL-MCNC: 0.5 MG/DL (ref 0.3–1.2)
BUN BLD-MCNC: 18 MG/DL (ref 9–23)
BUN/CREAT SERPL: 22.5 (ref 7–25)
CALCIUM SPEC-SCNC: 9.8 MG/DL (ref 8.7–10.4)
CHLORIDE SERPL-SCNC: 110 MMOL/L (ref 99–109)
CO2 SERPL-SCNC: 30 MMOL/L (ref 20–31)
CREAT BLD-MCNC: 0.8 MG/DL (ref 0.6–1.3)
ERYTHROCYTE [DISTWIDTH] IN BLOOD BY AUTOMATED COUNT: 15.7 % (ref 11.3–14.5)
GFR SERPL CREATININE-BSD FRML MDRD: 96 ML/MIN/1.73
GLOBULIN UR ELPH-MCNC: 2.5 GM/DL
GLUCOSE BLD-MCNC: 111 MG/DL (ref 70–100)
HCT VFR BLD AUTO: 43.1 % (ref 38.9–50.9)
HGB BLD-MCNC: 13.8 G/DL (ref 13.1–17.5)
LYMPHOCYTES # BLD AUTO: 1.3 10*3/MM3 (ref 0.6–4.8)
LYMPHOCYTES NFR BLD AUTO: 32.1 % (ref 24–44)
MCH RBC QN AUTO: 31.2 PG (ref 27–31)
MCHC RBC AUTO-ENTMCNC: 32.1 G/DL (ref 32–36)
MCV RBC AUTO: 97.3 FL (ref 80–99)
MONOCYTES # BLD AUTO: 0.2 10*3/MM3 (ref 0–1)
MONOCYTES NFR BLD AUTO: 5.1 % (ref 0–12)
NEUTROPHILS # BLD AUTO: 2.6 10*3/MM3 (ref 1.5–8.3)
NEUTROPHILS NFR BLD AUTO: 62.8 % (ref 41–71)
PLATELET # BLD AUTO: 131 10*3/MM3 (ref 150–450)
PMV BLD AUTO: 7.9 FL (ref 6–12)
POTASSIUM BLD-SCNC: 4.3 MMOL/L (ref 3.5–5.5)
PROT SERPL-MCNC: 6.6 G/DL (ref 5.7–8.2)
RBC # BLD AUTO: 4.43 10*6/MM3 (ref 4.2–5.76)
SODIUM BLD-SCNC: 141 MMOL/L (ref 132–146)
WBC NRBC COR # BLD: 4.2 10*3/MM3 (ref 3.5–10.8)

## 2017-04-11 PROCEDURE — 85025 COMPLETE CBC W/AUTO DIFF WBC: CPT

## 2017-04-11 PROCEDURE — 36415 COLL VENOUS BLD VENIPUNCTURE: CPT

## 2017-04-11 PROCEDURE — 83883 ASSAY NEPHELOMETRY NOT SPEC: CPT

## 2017-04-11 PROCEDURE — 80053 COMPREHEN METABOLIC PANEL: CPT

## 2017-04-12 LAB
KAPPA LC SERPL-MCNC: 1091.53 MG/L (ref 3.3–19.4)
KAPPA LC/LAMBDA SER: 119.29 {RATIO} (ref 0.26–1.65)
LAMBDA LC FREE SERPL-MCNC: 9.15 MG/L (ref 5.71–26.3)

## 2017-04-14 ENCOUNTER — EDUCATION (OUTPATIENT)
Dept: PHARMACY | Facility: HOSPITAL | Age: 72
End: 2017-04-14

## 2017-04-14 ENCOUNTER — OFFICE VISIT (OUTPATIENT)
Dept: ONCOLOGY | Facility: CLINIC | Age: 72
End: 2017-04-14

## 2017-04-14 VITALS
SYSTOLIC BLOOD PRESSURE: 117 MMHG | BODY MASS INDEX: 38.37 KG/M2 | WEIGHT: 268 LBS | RESPIRATION RATE: 18 BRPM | HEIGHT: 70 IN | HEART RATE: 77 BPM | TEMPERATURE: 98.7 F | DIASTOLIC BLOOD PRESSURE: 75 MMHG

## 2017-04-14 DIAGNOSIS — C90.02 MULTIPLE MYELOMA IN RELAPSE (HCC): Primary | ICD-10-CM

## 2017-04-14 PROCEDURE — 99215 OFFICE O/P EST HI 40 MIN: CPT | Performed by: INTERNAL MEDICINE

## 2017-04-14 RX ORDER — LENALIDOMIDE 25 MG/1
25 CAPSULE ORAL DAILY
Qty: 14 CAPSULE | Refills: 0 | Status: SHIPPED | OUTPATIENT
Start: 2017-04-14 | End: 2017-06-21 | Stop reason: SDUPTHER

## 2017-04-14 RX ORDER — IXAZOMIB 4 MG/1
CAPSULE ORAL
COMMUNITY
Start: 2017-03-28 | End: 2017-05-26 | Stop reason: SDUPTHER

## 2017-04-14 RX ORDER — SULFAMETHOXAZOLE AND TRIMETHOPRIM 800; 160 MG/1; MG/1
1 TABLET ORAL 2 TIMES DAILY
Qty: 60 TABLET | Refills: 3 | Status: SHIPPED | OUTPATIENT
Start: 2017-04-14 | End: 2018-01-01

## 2017-04-14 RX ORDER — SODIUM CHLORIDE 9 MG/ML
250 INJECTION, SOLUTION INTRAVENOUS ONCE
Status: CANCELLED | OUTPATIENT
Start: 2017-07-07

## 2017-04-14 RX ORDER — DEXAMETHASONE 4 MG/1
40 TABLET ORAL
Qty: 40 TABLET | Refills: 3 | Status: SHIPPED | OUTPATIENT
Start: 2017-04-14 | End: 2017-01-01 | Stop reason: SDUPTHER

## 2017-04-14 RX ORDER — ONDANSETRON HYDROCHLORIDE 8 MG/1
8 TABLET, FILM COATED ORAL 3 TIMES DAILY PRN
Qty: 30 TABLET | Refills: 5 | Status: SHIPPED | OUTPATIENT
Start: 2017-04-14 | End: 2018-01-01

## 2017-04-14 NOTE — PLAN OF CARE
Oral Chemotherapy Teaching      Patient Name/:  Johnny Ramirez   1945  Oral Chemotherapy Regimen: Ixazomib 4mg PO Days 1, 8, 15 every 28 days  Date Started Medication: Cycle 2 (Day 12)           Initial Teaching Follow Up Comments      Safety      Storage instructions (away from children; away from heat/cold, sunlight, or moisture), handling - use of gloves (caregivers), washing hands after touching pills, managing waste    “How are you storing your medications?”, reminders on storage, proper handling (caregivers using gloves, washing hands, away from children, managing waste, etc.), disposal of medication with D/C or dosage change  Patient is storing med in safe, dry area. PT reports washing his hands after handling medication.      Adherence       patient and/or caregiver on how to take medication, take with/without food, assess their adherence potential, stress importance of adherence, ways to manage adherence (pill boxes, phone reminders, calendars), what to do if miss a dose    “How are you taking your medication?” “How are you remembering to take your medication?”, “How many doses have you missed?”, determine reasons for non-adherence (not remembering, side effects, etc), ways to improve, overadherence? Remind patient of ways to improve/maintain adherence Patient takes medication in the evening on an empty stomach and has not missed any doses.      Side Effects/Adverse Reactions       patient on potential side effects, s/s, ways to manage, when to call MD/seek help    Determine if patient experiencing side effects, ways to manage  Pt reports that he has experienced a HA, but he is not sure if this is from the medication or his allergies. He states that he takes acetaminophen and the headache resolves. Pt also reports having diarrhea but he took OTC loperamide and that was sufficient.       Miscellaneous      Food interactions, DDIs, financial issues Determine if patient started any new  medications since being placed on oral chemo (analyze for DDI) No new medications to discuss.      Additional Notes: Pt stated that overall he is tolerating medication well. I encouraged him to call with any further questions or concerns.    Thanks,  Casper Xiong, PharmD  4/14/2017  11:31 AM

## 2017-04-14 NOTE — PROGRESS NOTES
DATE OF VISIT: 4/14/2017    REASON FOR VISIT: Followup for multiple myeloma, M-spike at diagnosis 2.4,  kappa restricted, with diffuse lytic lesions.      HISTORY OF PRESENT ILLNESS: The patient is a very pleasant 70-year-old  gentleman with past medical history significant for multiple myeloma diagnosed  11/26/2013. The patient presented with back pain, had a CAT scan done that  showed multiple vertebral body fractures with lytic lesions. He had a T11  biopsy done that came back with plasmocytosis compatible with multiple myeloma.  The patient was referred to me on 12/03/2013. I did a bone marrow biopsy that  came back consistent with multiple myeloma. Cytogenics at this point are still  pending. Whole body bone survey showed multiple bony lytic lesions affecting  right humerus, vertebral bodies, as well as pelvic bones. The patient had  serum protein electrophoresis done at diagnosis that showed monoclonal protein  of 2.5 grams plus 0.5 grams, kappa restricted. 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 The  patient was started on Revlimid 1.2 mg/sq m subcu once weekly 2 weeks on and 1  week off, Velcade 25 mg p.o. daily 2 weeks on and 1 week off, and dexamethasone  40 mg p.o. weekly on 12/09/2013. The patient completed cycle #6 on 04/07/2014.  The patient received autologous stem cell transplant with Dr. Bry Johnson at  [facility] on 05/13/2014. His day 100 was 08/26/2014. He had serum  protein electrophoresis that failed to show any evidence of monoclonal protein,  serum free light chain ratio was slightly elevated. The patient was started on  Revlimid 15 mg p.o. daily 2 weeks on and 1 week off on 06/16/2015. The patient's therapy was changed to Ninlaro 4 mg weekly 3 weeks on and 1 week off due to rising kappa lambda ratio.  The patient is here today in scheduled followup visit.    SUBJECTIVE: The patient has been doing fairly well. he was  able to tolerate  his treatment without any serious side effects. he denied any fever or  chills, no night sweats, denied any headaches. No bleeding.     PAST MEDICAL HISTORY/SOCIAL HISTORY/FAMILY HISTORY: Unchanged from my prior documentation done on 12/03/2013.    Review of Systems   Constitutional: Positive for fatigue. Negative for activity change, appetite change, chills, fever and unexpected weight change.   HENT: Negative for hearing loss, mouth sores, nosebleeds, sore throat and trouble swallowing.    Eyes: Negative for visual disturbance.   Respiratory: Negative for cough, chest tightness, shortness of breath and wheezing.    Cardiovascular: Negative for chest pain, palpitations and leg swelling.   Gastrointestinal: Negative for abdominal distention, abdominal pain, blood in stool, constipation, diarrhea, nausea, rectal pain and vomiting.   Endocrine: Negative for cold intolerance and heat intolerance.   Genitourinary: Negative for difficulty urinating, dysuria, frequency and urgency.   Musculoskeletal: Positive for arthralgias. Negative for back pain, gait problem, joint swelling and myalgias.   Skin: Negative for rash.   Neurological: Positive for numbness. Negative for dizziness, tremors, syncope, weakness, light-headedness and headaches.   Hematological: Negative for adenopathy. Does not bruise/bleed easily.   Psychiatric/Behavioral: Negative for confusion, sleep disturbance and suicidal ideas. The patient is not nervous/anxious.          Current Outpatient Prescriptions:   •  aspirin 81 MG tablet, Take 81 mg by mouth Daily., Disp: , Rfl:   •  azelastine (ASTELIN) 0.1 % nasal spray, into each nostril 2 (two) times a day., Disp: , Rfl:   •  Calcium Carb-Cholecalciferol (CALCIUM + D3) 600-200 MG-UNIT tablet, Take  by mouth., Disp: , Rfl:   •  divalproex (DEPAKOTE) 500 MG 24 hr tablet, Take 2 tablets by mouth Daily., Disp: 90 tablet, Rfl: 5  •  docusate sodium (DOCQLACE) 100 MG capsule, Take  by mouth.,  "Disp: , Rfl:   •  folic acid (FOLVITE) 1 MG tablet, TK 1 T PO QD, Disp: , Rfl: 5  •  gabapentin (NEURONTIN) 300 MG capsule, Take 300 mg by mouth 3 (Three) Times a Day., Disp: , Rfl:   •  Glucosamine-Chondroitin (OSTEO BI-FLEX REGULAR STRENGTH) 250-200 MG tablet, Take 1 tablet by mouth 2 (Two) Times a Day., Disp: , Rfl:   •  levETIRAcetam (KEPPRA) 1000 MG tablet, Take 1 tablet by mouth Daily. PATIENT TAKING 1.5 TAB TWICE A DAY (Patient taking differently: Take 500 mg by mouth 2 (Two) Times a Day. PATIENT TAKING 1/2 TAB TWICE A DAY), Disp: 90 tablet, Rfl: 6  •  loratadine (CLARITIN) 10 MG tablet, Take  by mouth., Disp: , Rfl:   •  methylphenidate (CONCERTA) 36 MG CR tablet, , Disp: , Rfl:   •  NINLARO 4 MG capsule, , Disp: , Rfl:   •  pantoprazole (PROTONIX) 40 MG EC tablet, TAKE 1 TABLET BY MOUTH EVERY DAY, Disp: 30 tablet, Rfl: 5  •  Probiotic Product (PROBIOTIC PO), Take 1 tablet by mouth 2 (Two) Times a Day As Needed., Disp: , Rfl:   •  Valerian Root 450 MG capsule, Take  by mouth., Disp: , Rfl:   •  vitamin B-12 (CYANOCOBALAMIN) 500 MCG tablet, Take 500 mcg by mouth Daily., Disp: , Rfl:   •  warfarin (COUMADIN) 5 MG tablet, Take 5 mg by mouth As Needed., Disp: , Rfl:     PHYSICAL EXAMINATION:   /75  Pulse 77  Temp 98.7 °F (37.1 °C)  Resp 18  Ht 70\" (177.8 cm)  Wt 268 lb (122 kg)  BMI 38.45 kg/m2   ECOG Performance Status: 1 - Symptomatic but completely ambulatory  General Appearance:  alert, cooperative, no apparent distress and appears stated age   Neurologic/Psychiatric: A&O x 3, gait steady, appropriate affect, strength 5/5 in all muscle groups   HEENT:  Normocephalic, without obvious abnormality, mucous membranes moist   Neck: Supple, symmetrical, trachea midline, no adenopathy;  No thyromegaly, masses, or tenderness   Lungs:   Clear to auscultation bilaterally; respirations regular, even, and unlabored bilaterally   Heart:  Regular rate and rhythm, no murmurs appreciated   Abdomen:   Soft, " non-tender, non-distended and no organomegaly   Lymph nodes: No cervical, supraclavicular, inguinal or axillary adenopathy noted   Extremities: Normal, atraumatic; no clubbing, cyanosis, or edema    Skin: No rashes, ulcers, or suspicious lesions noted     Lab on 04/11/2017   Component Date Value Ref Range Status   • Glucose 04/11/2017 111* 70 - 100 mg/dL Final   • BUN 04/11/2017 18  9 - 23 mg/dL Final   • Creatinine 04/11/2017 0.80  0.60 - 1.30 mg/dL Final   • Sodium 04/11/2017 141  132 - 146 mmol/L Final   • Potassium 04/11/2017 4.3  3.5 - 5.5 mmol/L Final   • Chloride 04/11/2017 110* 99 - 109 mmol/L Final   • CO2 04/11/2017 30.0  20.0 - 31.0 mmol/L Final   • Calcium 04/11/2017 9.8  8.7 - 10.4 mg/dL Final   • Total Protein 04/11/2017 6.6  5.7 - 8.2 g/dL Final   • Albumin 04/11/2017 4.10  3.20 - 4.80 g/dL Final   • ALT (SGPT) 04/11/2017 24  7 - 40 U/L Final   • AST (SGOT) 04/11/2017 28  0 - 33 U/L Final   • Alkaline Phosphatase 04/11/2017 63  25 - 100 U/L Final   • Total Bilirubin 04/11/2017 0.5  0.3 - 1.2 mg/dL Final   • eGFR Non African Amer 04/11/2017 96  >60 mL/min/1.73 Final   • Globulin 04/11/2017 2.5  gm/dL Final   • A/G Ratio 04/11/2017 1.6  1.5 - 2.5 g/dL Final   • BUN/Creatinine Ratio 04/11/2017 22.5  7.0 - 25.0 Final   • Anion Gap 04/11/2017 1.0* 3.0 - 11.0 mmol/L Final   • Free Light Chain, Kappa 04/11/2017 1091.53* 3.30 - 19.40 mg/L Final   • Free Lambda Light Chains 04/11/2017 9.15  5.71 - 26.30 mg/L Final   • Kappa/Lambda Ratio 04/11/2017 119.29* 0.26 - 1.65 Final   • WBC 04/11/2017 4.20  3.50 - 10.80 10*3/mm3 Final   • RBC 04/11/2017 4.43  4.20 - 5.76 10*6/mm3 Final   • Hemoglobin 04/11/2017 13.8  13.1 - 17.5 g/dL Final   • Hematocrit 04/11/2017 43.1  38.9 - 50.9 % Final   • RDW 04/11/2017 15.7* 11.3 - 14.5 % Final   • MCV 04/11/2017 97.3  80.0 - 99.0 fL Final   • MCH 04/11/2017 31.2* 27.0 - 31.0 pg Final   • MCHC 04/11/2017 32.1  32.0 - 36.0 g/dL Final   • MPV 04/11/2017 7.9  6.0 - 12.0 fL Final   •  Platelets 04/11/2017 131* 150 - 450 10*3/mm3 Final    Verified by repeat analysis.    • Neutrophil % 04/11/2017 62.8  41.0 - 71.0 % Final   • Lymphocyte % 04/11/2017 32.1  24.0 - 44.0 % Final   • Monocyte % 04/11/2017 5.1  0.0 - 12.0 % Final   • Neutrophils, Absolute 04/11/2017 2.60  1.50 - 8.30 10*3/mm3 Final   • Lymphocytes, Absolute 04/11/2017 1.30  0.60 - 4.80 10*3/mm3 Final   • Monocytes, Absolute 04/11/2017 0.20  0.00 - 1.00 10*3/mm3 Final        No results found.    ASSESSMENT: The patient is a very pleasant 71-year-old gentleman with new  diagnosis of multiple myeloma.    PROBLEM LIST:  1. Multiple myeloma, kappa restricted, M-spike 2.4 at diagnosis, ISS score  stage 2 with normal albumin but beta-2 microglobulin level of 4.1.   2. 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   3. Completed 6 cycles of Revlimid, Velcade, and dexamethasone from 12/09/2013  until 04/07/2014.  4. Received palliative radiation to T11, 4 treatments, completed 12/11/2013.   5. Status post autologous stem cell transplant done at [facility]  with Dr. Bry Johnson 05/13/2014.  6. Relapsed disease documented with elevated kappa lambda ratio with minute  amount of monoclonal protein done on 06/09/2015.   7. Bilateral pulmonary embolism, deep venous thrombosis 07/2014 on chronic  anticoagulation with Coumadin.  8. Chronic kidney disease Stage II.   9. 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   10. Started Revlimid 25 mg p.o. daily 2 weeks on and 1 week off on 06/16/2015.  11. Switched to Ninlaro on 02/21/2017 Secondary to increase in SFL ratio.  12. Partial seizure disorder.   13. Leukopenia and thrombocytopenia.   14. Peripheral neuropathy    PLAN:  1. I did go over the results in detail with the patient. I explained to him  that he still does not have any evidence of monoclonal protein; however,  his  Kappa lambda light chain ratio is steadily increasing, it went up from 25 on 11/18/2016 to 39 on 02/17/2017, then 79 on 03/17/2017, and is now 119.   2. We will continue Ninlaro 4 mg weekly 3 weeks on and 1 week off.   3. We will add Decadron 40 mg weekly as well as Revlimid 25 mg 2 weeks on, 1 week off due to rising kappa lambda light chain ratio.  4. The patient will come back to see me in 5 weeks with repeat blood work  prior to return, including serum free light chains.   5. I reviewed the bone survey results with the patient. I told him there are no new lytic lesions.   6. We will continue folic acid daily. I am suspecting his leukopenia and thrombocytopenia is secondary to Depakote and Keppra.   7. I will continue warfarin for history of DVT with monoclonal gammopathy.   8.  The patient is still on Neurontin for peripheral neuropathy. He will follow up with his neurologist regarding management.   9.  I reviewed the potential side effects of Ninlaro including but not limited to peripheral neuropathy, edema, rash, diarrhea, low blood counts, and hepatic insufficiency.   10.  The patient is scheduled to follow-up with Dr. Reyes at [facility] in May 2017.  Patient might be a candidate for a second autologous stem cell transplant.  11.  I will start the patient on Bactrim DS twice a day on Saturdays and Sundays for prophylaxis since patient was given high-dose steroids.  12.  I will continue Protonix 40 mg daily for GI prophylaxis.  13.  Patient will be started on Zometa 4 mg IV every 4 weeks starting next visit with calcium and vitamin D daily.  His skeletal survey done on February 21, 2017 showed mild diffuse lytic lesions that were essentially stable.    Scribed for Rosalie Segovia MD by CHRISTY Cabrera. 4/14/2017  11:52 AM  Rosalie Segovia MD  4/14/2017   I, Rosalie Segovia MD, personally performed the services described in this documentation as scribed by the above named individual in my  presence, and it is both accurate and complete.  4/14/2017  11:52 AM

## 2017-04-17 ENCOUNTER — TELEPHONE (OUTPATIENT)
Dept: ONCOLOGY | Facility: HOSPITAL | Age: 72
End: 2017-04-17

## 2017-04-17 NOTE — TELEPHONE ENCOUNTER
Today patient is cycle 2, day 15 of ixazomib.  Discussed that I escribed lenalidomide on Friday 4/14.  The plan will be to start lenalidomide and dex on Monday 4/24 (if lenalidomide is shipped in time).  He will start the Bactrim this weekend.  I will make him a calendar and mail it to his house to track all the medications.  Patient states he understands the new plan.    ----- Message from Aracelis Haile sent at 4/17/2017  9:48 AM EDT -----  Regarding: arcenio sandhulimid not received  Contact: [phone]  Pt states he has his dexamethazone and Bactrim ,  But has not received his Revlimid.  Please call to discuss his revlimid and when to start his other meds.

## 2017-04-19 ENCOUNTER — TRANSCRIBE ORDERS (OUTPATIENT)
Dept: GENERAL RADIOLOGY | Facility: HOSPITAL | Age: 72
End: 2017-04-19

## 2017-04-19 ENCOUNTER — HOSPITAL ENCOUNTER (OUTPATIENT)
Dept: GENERAL RADIOLOGY | Facility: HOSPITAL | Age: 72
Discharge: HOME | End: 2017-04-19
Attending: FAMILY MEDICINE | Admitting: FAMILY MEDICINE

## 2017-04-19 DIAGNOSIS — R07.81 RIB PAIN ON LEFT SIDE: ICD-10-CM

## 2017-04-19 DIAGNOSIS — R07.81 RIB PAIN ON LEFT SIDE: Primary | ICD-10-CM

## 2017-04-19 PROCEDURE — 71020 HC CHEST PA AND LATERAL: CPT

## 2017-05-03 ENCOUNTER — EDUCATION (OUTPATIENT)
Dept: ONCOLOGY | Facility: HOSPITAL | Age: 72
End: 2017-05-03

## 2017-05-03 DIAGNOSIS — C90.02 MULTIPLE MYELOMA IN RELAPSE (HCC): Primary | ICD-10-CM

## 2017-05-03 RX ORDER — LENALIDOMIDE 25 MG/1
25 CAPSULE ORAL DAILY
Qty: 14 CAPSULE | Refills: 0 | Status: SHIPPED | OUTPATIENT
Start: 2017-05-03 | End: 2017-06-21 | Stop reason: SDUPTHER

## 2017-05-24 ENCOUNTER — LAB (OUTPATIENT)
Dept: LAB | Facility: HOSPITAL | Age: 72
End: 2017-05-24

## 2017-05-24 ENCOUNTER — SPECIALTY PHARMACY (OUTPATIENT)
Dept: ONCOLOGY | Facility: HOSPITAL | Age: 72
End: 2017-05-24

## 2017-05-24 DIAGNOSIS — C90.02 MULTIPLE MYELOMA IN RELAPSE (HCC): Primary | ICD-10-CM

## 2017-05-24 DIAGNOSIS — C90.02 MULTIPLE MYELOMA IN RELAPSE (HCC): ICD-10-CM

## 2017-05-24 LAB
ALBUMIN SERPL-MCNC: 3.8 G/DL (ref 3.2–4.8)
ALBUMIN/GLOB SERPL: 1.7 G/DL (ref 1.5–2.5)
ALP SERPL-CCNC: 75 U/L (ref 25–100)
ALT SERPL W P-5'-P-CCNC: 47 U/L (ref 7–40)
ANION GAP SERPL CALCULATED.3IONS-SCNC: -1 MMOL/L (ref 3–11)
AST SERPL-CCNC: 50 U/L (ref 0–33)
BILIRUB SERPL-MCNC: 0.4 MG/DL (ref 0.3–1.2)
BUN BLD-MCNC: 24 MG/DL (ref 9–23)
BUN/CREAT SERPL: 26.7 (ref 7–25)
CALCIUM SPEC-SCNC: 9.3 MG/DL (ref 8.7–10.4)
CHLORIDE SERPL-SCNC: 104 MMOL/L (ref 99–109)
CO2 SERPL-SCNC: 35 MMOL/L (ref 20–31)
CREAT BLD-MCNC: 0.9 MG/DL (ref 0.6–1.3)
ERYTHROCYTE [DISTWIDTH] IN BLOOD BY AUTOMATED COUNT: 16.8 % (ref 11.3–14.5)
GFR SERPL CREATININE-BSD FRML MDRD: 84 ML/MIN/1.73
GLOBULIN UR ELPH-MCNC: 2.2 GM/DL
GLUCOSE BLD-MCNC: 97 MG/DL (ref 70–100)
HCT VFR BLD AUTO: 37.5 % (ref 38.9–50.9)
HGB BLD-MCNC: 11.9 G/DL (ref 13.1–17.5)
LYMPHOCYTES # BLD AUTO: 0.7 10*3/MM3 (ref 0.6–4.8)
LYMPHOCYTES NFR BLD AUTO: 27.4 % (ref 24–44)
MAGNESIUM SERPL-MCNC: 1.8 MG/DL (ref 1.3–2.7)
MCH RBC QN AUTO: 31.4 PG (ref 27–31)
MCHC RBC AUTO-ENTMCNC: 31.7 G/DL (ref 32–36)
MCV RBC AUTO: 99 FL (ref 80–99)
MONOCYTES # BLD AUTO: 0.2 10*3/MM3 (ref 0–1)
MONOCYTES NFR BLD AUTO: 8.9 % (ref 0–12)
NEUTROPHILS # BLD AUTO: 1.7 10*3/MM3 (ref 1.5–8.3)
NEUTROPHILS NFR BLD AUTO: 63.7 % (ref 41–71)
PHOSPHATE SERPL-MCNC: 3.2 MG/DL (ref 2.4–5.1)
PLATELET # BLD AUTO: 125 10*3/MM3 (ref 150–450)
PMV BLD AUTO: 8.5 FL (ref 6–12)
POTASSIUM BLD-SCNC: 3.9 MMOL/L (ref 3.5–5.5)
PROT SERPL-MCNC: 6 G/DL (ref 5.7–8.2)
RBC # BLD AUTO: 3.78 10*6/MM3 (ref 4.2–5.76)
SODIUM BLD-SCNC: 138 MMOL/L (ref 132–146)
WBC NRBC COR # BLD: 2.7 10*3/MM3 (ref 3.5–10.8)

## 2017-05-24 PROCEDURE — 83735 ASSAY OF MAGNESIUM: CPT | Performed by: INTERNAL MEDICINE

## 2017-05-24 PROCEDURE — 80053 COMPREHEN METABOLIC PANEL: CPT | Performed by: INTERNAL MEDICINE

## 2017-05-24 PROCEDURE — 84155 ASSAY OF PROTEIN SERUM: CPT | Performed by: INTERNAL MEDICINE

## 2017-05-24 PROCEDURE — 85025 COMPLETE CBC W/AUTO DIFF WBC: CPT

## 2017-05-24 PROCEDURE — 83883 ASSAY NEPHELOMETRY NOT SPEC: CPT | Performed by: INTERNAL MEDICINE

## 2017-05-24 PROCEDURE — 84165 PROTEIN E-PHORESIS SERUM: CPT | Performed by: INTERNAL MEDICINE

## 2017-05-24 PROCEDURE — 84100 ASSAY OF PHOSPHORUS: CPT | Performed by: INTERNAL MEDICINE

## 2017-05-24 PROCEDURE — 36415 COLL VENOUS BLD VENIPUNCTURE: CPT

## 2017-05-24 RX ORDER — LENALIDOMIDE 25 MG/1
25 CAPSULE ORAL DAILY
Qty: 14 CAPSULE | Refills: 0 | Status: SHIPPED | OUTPATIENT
Start: 2017-05-24 | End: 2017-06-21 | Stop reason: SDUPTHER

## 2017-05-25 LAB
ALBUMIN SERPL-MCNC: 3.5 G/DL (ref 2.9–4.4)
ALBUMIN/GLOB SERPL: 1.6 {RATIO} (ref 0.7–1.7)
ALPHA1 GLOB FLD ELPH-MCNC: 0.2 G/DL (ref 0–0.4)
ALPHA2 GLOB SERPL ELPH-MCNC: 0.5 G/DL (ref 0.4–1)
B-GLOBULIN SERPL ELPH-MCNC: 0.9 G/DL (ref 0.7–1.3)
GAMMA GLOB SERPL ELPH-MCNC: 0.6 G/DL (ref 0.4–1.8)
GLOBULIN SER CALC-MCNC: 2.2 G/DL (ref 2.2–3.9)
KAPPA LC SERPL-MCNC: 227.17 MG/L (ref 3.3–19.4)
KAPPA LC/LAMBDA SER: 20.86 {RATIO} (ref 0.26–1.65)
LAMBDA LC FREE SERPL-MCNC: 10.89 MG/L (ref 5.71–26.3)
Lab: ABNORMAL
M-SPIKE: ABNORMAL G/DL
PROT SERPL-MCNC: 5.7 G/DL (ref 6–8.5)

## 2017-05-26 ENCOUNTER — OFFICE VISIT (OUTPATIENT)
Dept: ONCOLOGY | Facility: CLINIC | Age: 72
End: 2017-05-26

## 2017-05-26 VITALS
HEART RATE: 83 BPM | TEMPERATURE: 97.2 F | BODY MASS INDEX: 39.51 KG/M2 | WEIGHT: 276 LBS | DIASTOLIC BLOOD PRESSURE: 75 MMHG | SYSTOLIC BLOOD PRESSURE: 127 MMHG | RESPIRATION RATE: 18 BRPM | HEIGHT: 70 IN

## 2017-05-26 DIAGNOSIS — C90.02 MULTIPLE MYELOMA IN RELAPSE (HCC): Primary | ICD-10-CM

## 2017-05-26 PROCEDURE — 99215 OFFICE O/P EST HI 40 MIN: CPT | Performed by: INTERNAL MEDICINE

## 2017-05-26 RX ORDER — METHYLPHENIDATE HYDROCHLORIDE 18 MG/1
1 TABLET ORAL DAILY
COMMUNITY
Start: 2017-05-25 | End: 2018-01-01 | Stop reason: DRUGHIGH

## 2017-05-26 RX ORDER — IXAZOMIB 4 MG/1
4 CAPSULE ORAL WEEKLY
Qty: 9 CAPSULE | Refills: 5 | Status: SHIPPED | OUTPATIENT
Start: 2017-05-26 | End: 2018-01-01 | Stop reason: ALTCHOICE

## 2017-06-08 RX ORDER — GABAPENTIN 300 MG/1
CAPSULE ORAL
Qty: 90 CAPSULE | Refills: 0 | OUTPATIENT
Start: 2017-06-08

## 2017-06-12 ENCOUNTER — TELEPHONE (OUTPATIENT)
Dept: NEUROLOGY | Facility: CLINIC | Age: 72
End: 2017-06-12

## 2017-06-12 RX ORDER — GABAPENTIN 300 MG/1
300 CAPSULE ORAL 3 TIMES DAILY
Qty: 90 CAPSULE | Refills: 5 | Status: SHIPPED | OUTPATIENT
Start: 2017-06-12 | End: 2017-01-01 | Stop reason: SDUPTHER

## 2017-06-12 NOTE — TELEPHONE ENCOUNTER
----- Message from Yakelin Mcclure MD sent at 6/12/2017  2:16 PM EDT -----  Regarding: RE: meds   Yes, thanks  ----- Message -----     From: Patricia Danielle Snellen, CMA     Sent: 6/12/2017  10:00 AM       To: Yakelin Mcclure MD  Subject: meds                                             Is it okay if I send refills for tequila. 300 tid for pt, thanks

## 2017-06-16 RX ORDER — PANTOPRAZOLE SODIUM 40 MG/1
TABLET, DELAYED RELEASE ORAL
Qty: 30 TABLET | Refills: 5 | Status: SHIPPED | OUTPATIENT
Start: 2017-06-16 | End: 2017-01-01 | Stop reason: SDUPTHER

## 2017-06-21 ENCOUNTER — SPECIALTY PHARMACY (OUTPATIENT)
Dept: ONCOLOGY | Facility: HOSPITAL | Age: 72
End: 2017-06-21

## 2017-06-21 ENCOUNTER — TELEPHONE (OUTPATIENT)
Dept: ONCOLOGY | Facility: CLINIC | Age: 72
End: 2017-06-21

## 2017-06-21 DIAGNOSIS — C90.02 MULTIPLE MYELOMA IN RELAPSE (HCC): Primary | ICD-10-CM

## 2017-06-21 RX ORDER — LENALIDOMIDE 25 MG/1
25 CAPSULE ORAL DAILY
Qty: 14 CAPSULE | Refills: 0 | Status: SHIPPED | OUTPATIENT
Start: 2017-06-21 | End: 2017-07-20 | Stop reason: SDUPTHER

## 2017-06-21 NOTE — TELEPHONE ENCOUNTER
----- Message from Rachel Barrios MA sent at 6/21/2017  3:16 PM EDT -----  Regarding: junaid revlimid  Contact: [phone]  Pt needs a new script for Revlimid 25mg  sent to [facility] pharmacy   Please call when this has been completed.

## 2017-06-21 NOTE — TELEPHONE ENCOUNTER
----- Message from Aracelis Haile sent at 6/21/2017  3:02 PM EDT -----  Regarding: HIRAM       ? ABOUT LAB WORK  Contact: [phone]  PLEASE CALL PT CONCERNING LAB WORK.  HE WANTS TO KNOW IF HE STILL HAS TO HAVE LAB WORK PRIOR TO HIS NEXT VISIT.

## 2017-06-22 ENCOUNTER — TELEPHONE (OUTPATIENT)
Dept: ONCOLOGY | Facility: HOSPITAL | Age: 72
End: 2017-06-22

## 2017-06-22 NOTE — TELEPHONE ENCOUNTER
Patient emailed me to ask if he should take the dex 40mg every week even when not taking Ninlaro or Revlimid.  I responded that he should take the dexamethasone every week despite the schedule of the other medications.  I also dicussed the indication for the dex.  Advised patient to contact me with further questions/concerns

## 2017-07-03 ENCOUNTER — LAB (OUTPATIENT)
Dept: LAB | Facility: HOSPITAL | Age: 72
End: 2017-07-03

## 2017-07-03 DIAGNOSIS — C90.02 MULTIPLE MYELOMA IN RELAPSE (HCC): ICD-10-CM

## 2017-07-03 LAB
ALBUMIN SERPL-MCNC: 4.3 G/DL (ref 3.2–4.8)
ALBUMIN/GLOB SERPL: 1.9 G/DL (ref 1.5–2.5)
ALP SERPL-CCNC: 78 U/L (ref 25–100)
ALT SERPL W P-5'-P-CCNC: 34 U/L (ref 7–40)
ANION GAP SERPL CALCULATED.3IONS-SCNC: 11 MMOL/L (ref 3–11)
AST SERPL-CCNC: 32 U/L (ref 0–33)
BILIRUB SERPL-MCNC: 0.5 MG/DL (ref 0.3–1.2)
BUN BLD-MCNC: 17 MG/DL (ref 9–23)
BUN/CREAT SERPL: 17 (ref 7–25)
CALCIUM SPEC-SCNC: 9.8 MG/DL (ref 8.7–10.4)
CHLORIDE SERPL-SCNC: 106 MMOL/L (ref 99–109)
CO2 SERPL-SCNC: 25 MMOL/L (ref 20–31)
CREAT BLD-MCNC: 1 MG/DL (ref 0.6–1.3)
ERYTHROCYTE [DISTWIDTH] IN BLOOD BY AUTOMATED COUNT: 15.6 % (ref 11.3–14.5)
GFR SERPL CREATININE-BSD FRML MDRD: 74 ML/MIN/1.73
GLOBULIN UR ELPH-MCNC: 2.3 GM/DL
GLUCOSE BLD-MCNC: 110 MG/DL (ref 70–100)
HCT VFR BLD AUTO: 42.3 % (ref 38.9–50.9)
HGB BLD-MCNC: 13.8 G/DL (ref 13.1–17.5)
LYMPHOCYTES # BLD AUTO: 0.5 10*3/MM3 (ref 0.6–4.8)
LYMPHOCYTES NFR BLD AUTO: 23.2 % (ref 24–44)
MCH RBC QN AUTO: 31.8 PG (ref 27–31)
MCHC RBC AUTO-ENTMCNC: 32.6 G/DL (ref 32–36)
MCV RBC AUTO: 97.6 FL (ref 80–99)
MONOCYTES # BLD AUTO: 0.1 10*3/MM3 (ref 0–1)
MONOCYTES NFR BLD AUTO: 4.1 % (ref 0–12)
NEUTROPHILS # BLD AUTO: 1.5 10*3/MM3 (ref 1.5–8.3)
NEUTROPHILS NFR BLD AUTO: 72.7 % (ref 41–71)
PLATELET # BLD AUTO: 172 10*3/MM3 (ref 150–450)
PMV BLD AUTO: 8.1 FL (ref 6–12)
POTASSIUM BLD-SCNC: 4.7 MMOL/L (ref 3.5–5.5)
PROT SERPL-MCNC: 6.6 G/DL (ref 5.7–8.2)
RBC # BLD AUTO: 4.34 10*6/MM3 (ref 4.2–5.76)
SODIUM BLD-SCNC: 142 MMOL/L (ref 132–146)
WBC NRBC COR # BLD: 2 10*3/MM3 (ref 3.5–10.8)

## 2017-07-03 PROCEDURE — 36415 COLL VENOUS BLD VENIPUNCTURE: CPT

## 2017-07-03 PROCEDURE — 84155 ASSAY OF PROTEIN SERUM: CPT | Performed by: INTERNAL MEDICINE

## 2017-07-03 PROCEDURE — 83883 ASSAY NEPHELOMETRY NOT SPEC: CPT | Performed by: INTERNAL MEDICINE

## 2017-07-03 PROCEDURE — 80053 COMPREHEN METABOLIC PANEL: CPT | Performed by: INTERNAL MEDICINE

## 2017-07-03 PROCEDURE — 84165 PROTEIN E-PHORESIS SERUM: CPT | Performed by: INTERNAL MEDICINE

## 2017-07-03 PROCEDURE — 85025 COMPLETE CBC W/AUTO DIFF WBC: CPT

## 2017-07-05 LAB
ALBUMIN SERPL-MCNC: 3.9 G/DL (ref 2.9–4.4)
ALBUMIN/GLOB SERPL: 1.5 {RATIO} (ref 0.7–1.7)
ALPHA1 GLOB FLD ELPH-MCNC: 0.2 G/DL (ref 0–0.4)
ALPHA2 GLOB SERPL ELPH-MCNC: 0.6 G/DL (ref 0.4–1)
B-GLOBULIN SERPL ELPH-MCNC: 1 G/DL (ref 0.7–1.3)
GAMMA GLOB SERPL ELPH-MCNC: 0.6 G/DL (ref 0.4–1.8)
GLOBULIN SER CALC-MCNC: 2.6 G/DL (ref 2.2–3.9)
KAPPA LC SERPL-MCNC: 301.7 MG/L (ref 3.3–19.4)
KAPPA LC/LAMBDA SER: 29.01 {RATIO} (ref 0.26–1.65)
LAMBDA LC FREE SERPL-MCNC: 10.4 MG/L (ref 5.7–26.3)
Lab: NORMAL
M-SPIKE: NORMAL G/DL
PROT SERPL-MCNC: 6.5 G/DL (ref 6–8.5)

## 2017-07-07 ENCOUNTER — DOCUMENTATION (OUTPATIENT)
Dept: NUTRITION | Facility: HOSPITAL | Age: 72
End: 2017-07-07

## 2017-07-07 ENCOUNTER — SPECIALTY PHARMACY (OUTPATIENT)
Dept: ONCOLOGY | Facility: HOSPITAL | Age: 72
End: 2017-07-07

## 2017-07-07 ENCOUNTER — OFFICE VISIT (OUTPATIENT)
Dept: ONCOLOGY | Facility: CLINIC | Age: 72
End: 2017-07-07

## 2017-07-07 ENCOUNTER — INFUSION (OUTPATIENT)
Dept: ONCOLOGY | Facility: HOSPITAL | Age: 72
End: 2017-07-07

## 2017-07-07 VITALS
SYSTOLIC BLOOD PRESSURE: 119 MMHG | DIASTOLIC BLOOD PRESSURE: 65 MMHG | TEMPERATURE: 97.2 F | RESPIRATION RATE: 18 BRPM | WEIGHT: 282 LBS | HEIGHT: 70 IN | HEART RATE: 84 BPM | BODY MASS INDEX: 40.37 KG/M2

## 2017-07-07 DIAGNOSIS — C90.02 MULTIPLE MYELOMA IN RELAPSE (HCC): Primary | ICD-10-CM

## 2017-07-07 LAB
ALBUMIN SERPL-MCNC: 3.8 G/DL (ref 3.2–4.8)
ALBUMIN/GLOB SERPL: 2 G/DL (ref 1.5–2.5)
ALP SERPL-CCNC: 66 U/L (ref 25–100)
ALT SERPL W P-5'-P-CCNC: 23 U/L (ref 7–40)
ANION GAP SERPL CALCULATED.3IONS-SCNC: 5 MMOL/L (ref 3–11)
AST SERPL-CCNC: 18 U/L (ref 0–33)
BILIRUB SERPL-MCNC: 0.6 MG/DL (ref 0.3–1.2)
BUN BLD-MCNC: 23 MG/DL (ref 9–23)
BUN/CREAT SERPL: 28.8 (ref 7–25)
CALCIUM SPEC-SCNC: 9.4 MG/DL (ref 8.7–10.4)
CHLORIDE SERPL-SCNC: 104 MMOL/L (ref 99–109)
CO2 SERPL-SCNC: 30 MMOL/L (ref 20–31)
CREAT BLD-MCNC: 0.8 MG/DL (ref 0.6–1.3)
CREAT BLDA-MCNC: 0.9 MG/DL (ref 0.6–1.3)
ERYTHROCYTE [DISTWIDTH] IN BLOOD BY AUTOMATED COUNT: 16.7 % (ref 11.3–14.5)
GFR SERPL CREATININE-BSD FRML MDRD: 96 ML/MIN/1.73
GLOBULIN UR ELPH-MCNC: 1.9 GM/DL
GLUCOSE BLD-MCNC: 109 MG/DL (ref 70–100)
HCT VFR BLD AUTO: 39.3 % (ref 38.9–50.9)
HGB BLD-MCNC: 12.4 G/DL (ref 13.1–17.5)
LYMPHOCYTES # BLD AUTO: 0.9 10*3/MM3 (ref 0.6–4.8)
LYMPHOCYTES NFR BLD AUTO: 24.2 % (ref 24–44)
MAGNESIUM SERPL-MCNC: 1.9 MG/DL (ref 1.3–2.7)
MCH RBC QN AUTO: 31.3 PG (ref 27–31)
MCHC RBC AUTO-ENTMCNC: 31.6 G/DL (ref 32–36)
MCV RBC AUTO: 98.9 FL (ref 80–99)
MONOCYTES # BLD AUTO: 0.3 10*3/MM3 (ref 0–1)
MONOCYTES NFR BLD AUTO: 8.2 % (ref 0–12)
NEUTROPHILS # BLD AUTO: 2.5 10*3/MM3 (ref 1.5–8.3)
NEUTROPHILS NFR BLD AUTO: 67.6 % (ref 41–71)
PHOSPHATE SERPL-MCNC: 3.2 MG/DL (ref 2.4–5.1)
PLATELET # BLD AUTO: 143 10*3/MM3 (ref 150–450)
PMV BLD AUTO: 8.3 FL (ref 6–12)
POTASSIUM BLD-SCNC: 4 MMOL/L (ref 3.5–5.5)
PROT SERPL-MCNC: 5.7 G/DL (ref 5.7–8.2)
RBC # BLD AUTO: 3.97 10*6/MM3 (ref 4.2–5.76)
SODIUM BLD-SCNC: 139 MMOL/L (ref 132–146)
WBC NRBC COR # BLD: 3.7 10*3/MM3 (ref 3.5–10.8)

## 2017-07-07 PROCEDURE — 99215 OFFICE O/P EST HI 40 MIN: CPT | Performed by: INTERNAL MEDICINE

## 2017-07-07 PROCEDURE — 82565 ASSAY OF CREATININE: CPT

## 2017-07-07 PROCEDURE — 80053 COMPREHEN METABOLIC PANEL: CPT | Performed by: INTERNAL MEDICINE

## 2017-07-07 PROCEDURE — 84100 ASSAY OF PHOSPHORUS: CPT | Performed by: INTERNAL MEDICINE

## 2017-07-07 PROCEDURE — 85025 COMPLETE CBC W/AUTO DIFF WBC: CPT | Performed by: INTERNAL MEDICINE

## 2017-07-07 PROCEDURE — 25010000002 ZOLEDRONIC ACID PER 1 MG: Performed by: INTERNAL MEDICINE

## 2017-07-07 PROCEDURE — 96365 THER/PROPH/DIAG IV INF INIT: CPT

## 2017-07-07 PROCEDURE — 83735 ASSAY OF MAGNESIUM: CPT | Performed by: INTERNAL MEDICINE

## 2017-07-07 PROCEDURE — 96374 THER/PROPH/DIAG INJ IV PUSH: CPT

## 2017-07-07 RX ORDER — SODIUM CHLORIDE 9 MG/ML
250 INJECTION, SOLUTION INTRAVENOUS ONCE
Status: DISCONTINUED | OUTPATIENT
Start: 2017-07-07 | End: 2017-07-07 | Stop reason: HOSPADM

## 2017-07-07 RX ORDER — LENALIDOMIDE 25 MG/1
25 CAPSULE ORAL DAILY
Qty: 14 CAPSULE | Refills: 0 | Status: SHIPPED | OUTPATIENT
Start: 2017-07-07 | End: 2017-07-20 | Stop reason: SDUPTHER

## 2017-07-07 RX ADMIN — ZOLEDRONIC ACID 4 MG: 4 INJECTION, SOLUTION, CONCENTRATE INTRAVENOUS at 10:45

## 2017-07-07 NOTE — PROGRESS NOTES
DATE OF VISIT: 7/7/2017    REASON FOR VISIT: Followup for multiple myeloma, M-spike at diagnosis 2.4,  kappa restricted, with diffuse lytic lesions.      HISTORY OF PRESENT ILLNESS: The patient is a very pleasant 70-year-old  gentleman with past medical history significant for multiple myeloma diagnosed  11/26/2013. The patient presented with back pain, had a CAT scan done that  showed multiple vertebral body fractures with lytic lesions. He had a T11  biopsy done that came back with plasmocytosis compatible with multiple myeloma.  The patient was referred to me on 12/03/2013. I did a bone marrow biopsy that  came back consistent with multiple myeloma. Cytogenics at this point are still  pending. Whole body bone survey showed multiple bony lytic lesions affecting  right humerus, vertebral bodies, as well as pelvic bones. The patient had  serum protein electrophoresis done at diagnosis that showed monoclonal protein  of 2.5 grams plus 0.5 grams, kappa restricted. 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 The  patient was started on Revlimid 1.2 mg/sq m subcu once weekly 2 weeks on and 1  week off, Velcade 25 mg p.o. daily 2 weeks on and 1 week off, and dexamethasone  40 mg p.o. weekly on 12/09/2013. The patient completed cycle #6 on 04/07/2014.  The patient received autologous stem cell transplant with Dr. Bry Johnson at  [facility] on 05/13/2014. His day 100 was 08/26/2014. He had serum  protein electrophoresis that failed to show any evidence of monoclonal protein,  serum free light chain ratio was slightly elevated. The patient was started on  Revlimid 15 mg p.o. daily 2 weeks on and 1 week off on 06/16/2015. The patient's therapy was changed to Ninlaro 4 mg weekly 3 weeks on and 1 week off due to rising kappa lambda ratio. His kappa lambda ration continued rise of Ninlaro and he was started on Decadron 40 mg weekly with Revlimid 25 mg 2  weeks on and 1 week off on 4/14/2017.  The patient is here today in scheduled followup visit.    SUBJECTIVE: The patient has been doing fairly well. he was able to tolerate  his treatment without any serious side effects. he denied any fever or  chills, no night sweats, denied any headaches. No bleeding.  He is complaining of insomnia on days of Decadron.  He has mild fatigue.  He is gaining weight.    PAST MEDICAL HISTORY/SOCIAL HISTORY/FAMILY HISTORY: Unchanged from my prior documentation done on 12/03/2013.    Review of Systems   Constitutional: Positive for fatigue. Negative for activity change, appetite change, chills, fever and unexpected weight change.   HENT: Negative for hearing loss, mouth sores, nosebleeds, sore throat and trouble swallowing.    Eyes: Negative for visual disturbance.   Respiratory: Negative for cough, chest tightness, shortness of breath and wheezing.    Cardiovascular: Negative for chest pain, palpitations and leg swelling.   Gastrointestinal: Negative for abdominal distention, abdominal pain, blood in stool, constipation, diarrhea, nausea, rectal pain and vomiting.   Endocrine: Negative for cold intolerance and heat intolerance.   Genitourinary: Negative for difficulty urinating, dysuria, frequency and urgency.   Musculoskeletal: Positive for arthralgias. Negative for back pain, gait problem, joint swelling and myalgias.   Skin: Negative for rash.   Neurological: Positive for numbness. Negative for dizziness, tremors, syncope, weakness, light-headedness and headaches.   Hematological: Negative for adenopathy. Does not bruise/bleed easily.   Psychiatric/Behavioral: Negative for confusion, sleep disturbance and suicidal ideas. The patient is not nervous/anxious.          Current Outpatient Prescriptions:   •  aspirin 81 MG tablet, Take 81 mg by mouth Daily., Disp: , Rfl:   •  azelastine (ASTELIN) 0.1 % nasal spray, into each nostril 2 (two) times a day., Disp: , Rfl:   •  Calcium  Carb-Cholecalciferol (CALCIUM + D3) 600-200 MG-UNIT tablet, Take  by mouth., Disp: , Rfl:   •  dexamethasone (DECADRON) 4 MG tablet, Take 10 tablets by mouth Daily With Breakfast. Take with food on Days 1, 8, 15 and 22., Disp: 40 tablet, Rfl: 3  •  divalproex (DEPAKOTE) 500 MG 24 hr tablet, Take 2 tablets by mouth Daily., Disp: 90 tablet, Rfl: 5  •  docusate sodium (DOCQLACE) 100 MG capsule, Take  by mouth., Disp: , Rfl:   •  folic acid (FOLVITE) 1 MG tablet, TK 1 T PO QD, Disp: , Rfl: 5  •  gabapentin (NEURONTIN) 300 MG capsule, Take 1 capsule by mouth 3 (Three) Times a Day., Disp: 90 capsule, Rfl: 5  •  Glucosamine-Chondroitin (OSTEO BI-FLEX REGULAR STRENGTH) 250-200 MG tablet, Take 1 tablet by mouth 2 (Two) Times a Day., Disp: , Rfl:   •  lenalidomide (REVLIMID) 25 MG capsule, Take 1 capsule by mouth Daily. Daily for 14 days on, 7 days off. [facility] [id number] Adult Male, Disp: 14 capsule, Rfl: 0  •  levETIRAcetam (KEPPRA) 1000 MG tablet, Take 1 tablet by mouth Daily. PATIENT TAKING 1.5 TAB TWICE A DAY (Patient taking differently: Take 500 mg by mouth 2 (Two) Times a Day. PATIENT TAKING 1/2 TAB TWICE A DAY), Disp: 90 tablet, Rfl: 6  •  loratadine (CLARITIN) 10 MG tablet, Take  by mouth., Disp: , Rfl:   •  methylphenidate (CONCERTA) 18 MG CR tablet, Take 1 tablet by mouth Daily., Disp: , Rfl:   •  methylphenidate (CONCERTA) 36 MG CR tablet, Take 18 mg by mouth Every Morning., Disp: , Rfl:   •  NINLARO 4 MG capsule, Take 4 mg by mouth 1 (One) Time Per Week. 3 weeks on, 1 week off, Disp: 9 capsule, Rfl: 5  •  ondansetron (ZOFRAN) 8 MG tablet, Take 1 tablet by mouth 3 (Three) Times a Day As Needed for Nausea or Vomiting., Disp: 30 tablet, Rfl: 5  •  pantoprazole (PROTONIX) 40 MG EC tablet, TAKE 1 TABLET BY MOUTH EVERY DAY, Disp: 30 tablet, Rfl: 5  •  Probiotic Product (PROBIOTIC PO), Take 1 tablet by mouth 2 (Two) Times a Day As Needed., Disp: , Rfl:   •  sulfamethoxazole-trimethoprim (BACTRIM DS,SEPTRA DS) 800-160 MG per  "tablet, Take 1 tablet by mouth 2 (Two) Times a Day. One tablet twice a day on saturdays and sundays for prophylaxis, Disp: 60 tablet, Rfl: 3  •  Valerian Root 450 MG capsule, Take  by mouth., Disp: , Rfl:   •  vitamin B-12 (CYANOCOBALAMIN) 500 MCG tablet, Take 500 mcg by mouth Daily., Disp: , Rfl:   •  warfarin (COUMADIN) 5 MG tablet, Take 5 mg by mouth As Needed., Disp: , Rfl:     PHYSICAL EXAMINATION:   /65 Comment: LUE  Pulse 84  Temp 97.2 °F (36.2 °C) (Temporal Artery )   Resp 18  Ht 70\" (177.8 cm)  Wt 282 lb (128 kg)  BMI 40.46 kg/m2   ECOG Performance Status: 1 - Symptomatic but completely ambulatory  General Appearance:  alert, cooperative, no apparent distress and appears stated age   Neurologic/Psychiatric: A&O x 3, gait steady, appropriate affect, strength 5/5 in all muscle groups   HEENT:  Normocephalic, without obvious abnormality, mucous membranes moist   Neck: Supple, symmetrical, trachea midline, no adenopathy;  No thyromegaly, masses, or tenderness   Lungs:   Clear to auscultation bilaterally; respirations regular, even, and unlabored bilaterally   Heart:  Regular rate and rhythm, no murmurs appreciated   Abdomen:   Soft, non-tender, non-distended and no organomegaly   Lymph nodes: No cervical, supraclavicular, inguinal or axillary adenopathy noted   Extremities: Normal, atraumatic; no clubbing, cyanosis, or edema    Skin: No rashes, ulcers, or suspicious lesions noted     Lab on 07/03/2017   Component Date Value Ref Range Status   • Glucose 07/03/2017 110* 70 - 100 mg/dL Final   • BUN 07/03/2017 17  9 - 23 mg/dL Final   • Creatinine 07/03/2017 1.00  0.60 - 1.30 mg/dL Final   • Sodium 07/03/2017 142  132 - 146 mmol/L Final   • Potassium 07/03/2017 4.7  3.5 - 5.5 mmol/L Final   • Chloride 07/03/2017 106  99 - 109 mmol/L Final   • CO2 07/03/2017 25.0  20.0 - 31.0 mmol/L Final   • Calcium 07/03/2017 9.8  8.7 - 10.4 mg/dL Final   • Total Protein 07/03/2017 6.6  5.7 - 8.2 g/dL Final   • " Albumin 07/03/2017 4.30  3.20 - 4.80 g/dL Final   • ALT (SGPT) 07/03/2017 34  7 - 40 U/L Final   • AST (SGOT) 07/03/2017 32  0 - 33 U/L Final   • Alkaline Phosphatase 07/03/2017 78  25 - 100 U/L Final   • Total Bilirubin 07/03/2017 0.5  0.3 - 1.2 mg/dL Final   • eGFR Non African Amer 07/03/2017 74  >60 mL/min/1.73 Final   • Globulin 07/03/2017 2.3  gm/dL Final   • A/G Ratio 07/03/2017 1.9  1.5 - 2.5 g/dL Final   • BUN/Creatinine Ratio 07/03/2017 17.0  7.0 - 25.0 Final   • Anion Gap 07/03/2017 11.0  3.0 - 11.0 mmol/L Final   • Total Protein 07/03/2017 6.5  6.0 - 8.5 g/dL Final   • Albumin 07/03/2017 3.9  2.9 - 4.4 g/dL Final   • Alpha-1-Globulin 07/03/2017 0.2  0.0 - 0.4 g/dL Final   • Alpha-2-Globulin 07/03/2017 0.6  0.4 - 1.0 g/dL Final   • Beta Globulin 07/03/2017 1.0  0.7 - 1.3 g/dL Final   • Gamma Globulin 07/03/2017 0.6  0.4 - 1.8 g/dL Final   • M-Matt 07/03/2017 Not Observed  Not Observed g/dL Final   • Globulin 07/03/2017 2.6  2.2 - 3.9 g/dL Final   • A/G Ratio 07/03/2017 1.5  0.7 - 1.7 Final   • Please note 07/03/2017 Comment   Final    Protein electrophoresis scan will follow via computer, mail, or   delivery.   • Free Light Chain, Kappa 07/03/2017 301.7* 3.3 - 19.4 mg/L Final                  **Please note reference interval change**   • Free Lambda Light Chains 07/03/2017 10.4  5.7 - 26.3 mg/L Final                  **Please note reference interval change**   • Kappa/Lambda Ratio 07/03/2017 29.01* 0.26 - 1.65 Final   • WBC 07/03/2017 2.00* 3.50 - 10.80 10*3/mm3 Final   • RBC 07/03/2017 4.34  4.20 - 5.76 10*6/mm3 Final   • Hemoglobin 07/03/2017 13.8  13.1 - 17.5 g/dL Final   • Hematocrit 07/03/2017 42.3  38.9 - 50.9 % Final   • RDW 07/03/2017 15.6* 11.3 - 14.5 % Final   • MCV 07/03/2017 97.6  80.0 - 99.0 fL Final   • MCH 07/03/2017 31.8* 27.0 - 31.0 pg Final   • MCHC 07/03/2017 32.6  32.0 - 36.0 g/dL Final   • MPV 07/03/2017 8.1  6.0 - 12.0 fL Final   • Platelets 07/03/2017 172  150 - 450 10*3/mm3  Final    Verified by repeat analysis.    • Neutrophil % 07/03/2017 72.7* 41.0 - 71.0 % Final   • Lymphocyte % 07/03/2017 23.2* 24.0 - 44.0 % Final   • Monocyte % 07/03/2017 4.1  0.0 - 12.0 % Final   • Neutrophils, Absolute 07/03/2017 1.50  1.50 - 8.30 10*3/mm3 Final   • Lymphocytes, Absolute 07/03/2017 0.50* 0.60 - 4.80 10*3/mm3 Final   • Monocytes, Absolute 07/03/2017 0.10  0.00 - 1.00 10*3/mm3 Final        No results found.    ASSESSMENT: The patient is a very pleasant 71-year-old gentleman with new  diagnosis of multiple myeloma.    PROBLEM LIST:  1. Multiple myeloma, kappa restricted, M-spike 2.4 at diagnosis, ISS score  stage 2 with normal albumin but beta-2 microglobulin level of 4.1.   2. 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   3. Completed 6 cycles of Revlimid, Velcade, and dexamethasone from 12/09/2013  until 04/07/2014.  4. Received palliative radiation to T11, 4 treatments, completed 12/11/2013.   5. Status post autologous stem cell transplant done at [facility]  with Dr. Bry Johnson 05/13/2014.  6. Relapsed disease documented with elevated kappa lambda ratio with minute  amount of monoclonal protein done on 06/09/2015.   7. Bilateral pulmonary embolism, deep venous thrombosis 07/2014 on chronic  anticoagulation with Coumadin.  8. Chronic kidney disease Stage II.   9. 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   10. Started Revlimid 25 mg p.o. daily 2 weeks on and 1 week off on 06/16/2015.  11. Switched to Ninlaro on 02/21/2017 Secondary to increase in SFL ratio.  12. Added Decadron 40 mg weekly with Revlimid 25 mg 2 weeks on 1 week off on 4/14/2017 secondary to rising SFL ration on Ninlaro alone.   13. Partial seizure disorder.   14. Leukopenia and thrombocytopenia.   15. Peripheral neuropathy    PLAN:  1. I did go over the results in detail with the patient.  I explained to him  that he still does not have any evidence of monoclonal protein; his  Kappa lambda light chain ratio is Stable.     2. We will continue Ninlaro 4 mg weekly 3 weeks on and 1 week off.   3. We will continue Decadron 40 mg weekly as well as Revlimid 25 mg 2 weeks on 1 week off.  4. The patient will come back to see me in 6 weeks with repeat blood work  prior to return, including serum free light chains.   5. We will continue folic acid daily. I am suspecting his leukopenia and thrombocytopenia is secondary to Depakote and Keppra.   6. I will continue warfarin for history of DVT with monoclonal gammopathy.   7.  The patient is still on Neurontin for peripheral neuropathy. He will follow up with his neurologist regarding management.   8.  I reviewed the potential side effects of Ninlaro and Revlimid including but not limited to peripheral neuropathy, edema, rash, diarrhea, low blood counts,hepatic insufficiency, and potential death.   9.  The patient will continue to follow up with Dr. Reyes at [facility] regarding whether the patient may be a candidate for a second autologous stem cell transplant.  10.  I will continue the patient on Bactrim DS twice a day on Saturdays and Sundays for prophylaxis since patient was given high-dose steroids.  11.  I will continue Protonix 40 mg daily for GI prophylaxis.  12.  The patient will continue Zometa 4 mg IV every 3 month with calcium and vitam in D due to mild diffuse lytic lesions seen on skeletal survey done 2/21/2017.  I will start this with next visit  13.  I will decrease Decadron gradually starting next visit if he continued to have good response to treatment    Rosalie Segovia MD  7/7/2017 7/7/2017  9:14 AM

## 2017-07-07 NOTE — PROGRESS NOTES
[facility] Nutrition     DX:  Multiple myeloma  Treatment:  Ixazomib / Lenalidomide / Dexamethasone - Cycle 1 of 12 Zometa started today    Height 70 inches Weight 282 lbs 14 lbs weight gain since April 2017    Consult with patient.  He states that he is doing very well except for chemotherapy related pain in his feet and legs which prevents him from his more active lifestyle and exercise.  He denies any nutritional impact symptoms related to treatment.  Appetite excellent; weight gain related to increased appetite, excess food intake, steroids.  Reviewed typical food intake which indicates that patient is focused on higher protein and nutrient dense foods.  He loves to cook; regularly shops the farmer's markets for fruits and vegetables.    Provided patient with information on our [facility], encouraging him to schedule as it is something that he benefits from and enjoys.    Will follow as needed; contact information provided.

## 2017-07-20 DIAGNOSIS — C90.02 MULTIPLE MYELOMA IN RELAPSE (HCC): ICD-10-CM

## 2017-07-20 RX ORDER — LENALIDOMIDE 25 MG/1
25 CAPSULE ORAL DAILY
Qty: 14 CAPSULE | Refills: 0 | Status: SHIPPED | OUTPATIENT
Start: 2017-07-20 | End: 2017-08-14 | Stop reason: SDUPTHER

## 2017-07-24 ENCOUNTER — EDUCATION (OUTPATIENT)
Dept: ONCOLOGY | Facility: HOSPITAL | Age: 72
End: 2017-07-24

## 2017-07-24 NOTE — PLAN OF CARE
Oral Chemotherapy Teaching      Patient Name/:  Johnny Ramirez   1945  Oral Chemotherapy Regimen:  Ixazomib 4mg on days 1, 8, and 15 + lenalidomide 25mg daily days 1-14, then off 7 + Dex 40mg weekly  Date Started Medication: Cycle 6 start 17 (today is day 8)    Initial Teaching Follow Up Comments     Safety     Storage instructions (away from children; away from heat/cold, sunlight, or moisture), handling - use of gloves (caregivers), washing hands after touching pills, managing waste     “How are you storing your medications?”, reminders on storage, proper handling (caregivers using gloves, washing hands, away from children, managing waste, etc.), disposal of medication with D/C or dosage change    Patient is washing hands after handling      Adherence      patient and/or caregiver on how to take medication, take with/without food, assess their adherence potential, stress importance of adherence, ways to manage adherence (pill boxes, phone reminders, calendars), what to do if miss a dose   “How are you taking your medication?” “How are you remembering to take your medication?”, “How many doses have you missed?”, determine reasons for non-adherence (not remembering, side effects, etc), ways to improve, overadherence? Remind patient of ways to improve/maintain adherence   Patient has not missed a dose of ixazomib or lenalidomide, however this morning he only had 8 tablets left of Dex so he took those. I instructed him to go ahead and take the other two tablets this evening (about 5:30pm).      Side Effects/Adverse Reactions      patient on potential side effects, s/s, ways to manage, when to call MD/seek help     Determine if patient experiencing side effects, ways to manage  Patient has had aches/pains and takes Tylenol occasionally. His lower spine bothers him, he feels like it's muscular. His doctor prescribed him cyclobenzaprine a while back when it all started, he checked with him  recently and said it would be okay to take. Instructed him to try 5mg tonight before bed and see if it doesn't relieve some of the tension/pain in his lower pack region.      Miscellaneous     Food interactions, DDIs, financial issues Determine if patient started any new medications since being placed on oral chemo (analyze for DDI) No new medications     Additional Notes:  Patient knows to call if he has any questions.

## 2017-08-01 ENCOUNTER — TELEPHONE (OUTPATIENT)
Dept: ONCOLOGY | Facility: CLINIC | Age: 72
End: 2017-08-01

## 2017-08-01 NOTE — TELEPHONE ENCOUNTER
----- Message from Audrey Lynn sent at 8/1/2017  2:14 PM EDT -----  Regarding: [facility] - THERAPEUTIC MASSAGE   Contact: [phone]  PATIENT CALLED AND SAID HE NEEDS AN ORDER FOR THERAPEUTIC MASSAGE FOR THE [facility] IF IT COULD BE BACKDATED FOR 07/28/2017, BECAUSE HE HAS ALREADY HAD ONE.     HE WANTS TO SEE IF HIS INSURANCE WILL COVER A PORTION.    PLEASE CALL IF YOU HAVE ANY QUESTIONS

## 2017-08-14 DIAGNOSIS — C90.02 MULTIPLE MYELOMA IN RELAPSE (HCC): ICD-10-CM

## 2017-08-14 RX ORDER — LENALIDOMIDE 25 MG/1
25 CAPSULE ORAL DAILY
Qty: 14 CAPSULE | Refills: 0 | Status: SHIPPED | OUTPATIENT
Start: 2017-08-14 | End: 2017-01-01 | Stop reason: SDUPTHER

## 2017-08-15 ENCOUNTER — LAB (OUTPATIENT)
Dept: LAB | Facility: HOSPITAL | Age: 72
End: 2017-08-15
Attending: INTERNAL MEDICINE

## 2017-08-15 DIAGNOSIS — C90.02 MULTIPLE MYELOMA IN RELAPSE (HCC): ICD-10-CM

## 2017-08-15 LAB
ALBUMIN SERPL-MCNC: 4 G/DL (ref 3.2–4.8)
ALBUMIN/GLOB SERPL: 2 G/DL (ref 1.5–2.5)
ALP SERPL-CCNC: 75 U/L (ref 25–100)
ALT SERPL W P-5'-P-CCNC: 27 U/L (ref 7–40)
ANION GAP SERPL CALCULATED.3IONS-SCNC: 8 MMOL/L (ref 3–11)
AST SERPL-CCNC: 19 U/L (ref 0–33)
BILIRUB SERPL-MCNC: 0.4 MG/DL (ref 0.3–1.2)
BUN BLD-MCNC: 23 MG/DL (ref 9–23)
BUN/CREAT SERPL: 25.6 (ref 7–25)
CALCIUM SPEC-SCNC: 9.2 MG/DL (ref 8.7–10.4)
CHLORIDE SERPL-SCNC: 106 MMOL/L (ref 99–109)
CO2 SERPL-SCNC: 27 MMOL/L (ref 20–31)
CREAT BLD-MCNC: 0.9 MG/DL (ref 0.6–1.3)
DEPRECATED FTI SERPL-MCNC: 1.8 TBI
ERYTHROCYTE [DISTWIDTH] IN BLOOD BY AUTOMATED COUNT: 16.6 % (ref 11.3–14.5)
GFR SERPL CREATININE-BSD FRML MDRD: 83 ML/MIN/1.73
GLOBULIN UR ELPH-MCNC: 2 GM/DL
GLUCOSE BLD-MCNC: 127 MG/DL (ref 70–100)
HCT VFR BLD AUTO: 39.3 % (ref 38.9–50.9)
HGB BLD-MCNC: 12.4 G/DL (ref 13.1–17.5)
LYMPHOCYTES # BLD AUTO: 1 10*3/MM3 (ref 0.6–4.8)
LYMPHOCYTES NFR BLD AUTO: 16 % (ref 24–44)
MCH RBC QN AUTO: 30.6 PG (ref 27–31)
MCHC RBC AUTO-ENTMCNC: 31.5 G/DL (ref 32–36)
MCV RBC AUTO: 97.1 FL (ref 80–99)
MONOCYTES # BLD AUTO: 0.4 10*3/MM3 (ref 0–1)
MONOCYTES NFR BLD AUTO: 6.8 % (ref 0–12)
NEUTROPHILS # BLD AUTO: 4.9 10*3/MM3 (ref 1.5–8.3)
NEUTROPHILS NFR BLD AUTO: 77.2 % (ref 41–71)
PLATELET # BLD AUTO: 154 10*3/MM3 (ref 150–450)
PMV BLD AUTO: 8.6 FL (ref 6–12)
POTASSIUM BLD-SCNC: 4.1 MMOL/L (ref 3.5–5.5)
PROT SERPL-MCNC: 6 G/DL (ref 5.7–8.2)
RBC # BLD AUTO: 4.05 10*6/MM3 (ref 4.2–5.76)
SODIUM BLD-SCNC: 141 MMOL/L (ref 132–146)
T3RU NFR SERPL: 38.3 % (ref 23–37)
T4 SERPL-MCNC: 4.7 MCG/DL (ref 4.7–11.4)
TSH SERPL DL<=0.05 MIU/L-ACNC: 0.7 MIU/ML (ref 0.35–5.35)
WBC NRBC COR # BLD: 6.3 10*3/MM3 (ref 3.5–10.8)

## 2017-08-15 PROCEDURE — 36415 COLL VENOUS BLD VENIPUNCTURE: CPT

## 2017-08-15 PROCEDURE — 84479 ASSAY OF THYROID (T3 OR T4): CPT | Performed by: INTERNAL MEDICINE

## 2017-08-15 PROCEDURE — 84443 ASSAY THYROID STIM HORMONE: CPT | Performed by: INTERNAL MEDICINE

## 2017-08-15 PROCEDURE — 80053 COMPREHEN METABOLIC PANEL: CPT | Performed by: INTERNAL MEDICINE

## 2017-08-15 PROCEDURE — 83883 ASSAY NEPHELOMETRY NOT SPEC: CPT | Performed by: INTERNAL MEDICINE

## 2017-08-15 PROCEDURE — 84436 ASSAY OF TOTAL THYROXINE: CPT | Performed by: INTERNAL MEDICINE

## 2017-08-15 PROCEDURE — 85025 COMPLETE CBC W/AUTO DIFF WBC: CPT

## 2017-08-18 ENCOUNTER — OFFICE VISIT (OUTPATIENT)
Dept: ONCOLOGY | Facility: CLINIC | Age: 72
End: 2017-08-18

## 2017-08-18 ENCOUNTER — INFUSION (OUTPATIENT)
Dept: ONCOLOGY | Facility: HOSPITAL | Age: 72
End: 2017-08-18

## 2017-08-18 VITALS
WEIGHT: 292 LBS | BODY MASS INDEX: 41.8 KG/M2 | TEMPERATURE: 97.6 F | HEIGHT: 70 IN | SYSTOLIC BLOOD PRESSURE: 124 MMHG | HEART RATE: 86 BPM | RESPIRATION RATE: 20 BRPM | DIASTOLIC BLOOD PRESSURE: 73 MMHG

## 2017-08-18 DIAGNOSIS — C90.00 MULTIPLE MYELOMA, REMISSION STATUS UNSPECIFIED (HCC): Primary | ICD-10-CM

## 2017-08-18 DIAGNOSIS — C90.02 MULTIPLE MYELOMA IN RELAPSE (HCC): Primary | ICD-10-CM

## 2017-08-18 LAB
CREAT BLDA-MCNC: 0.8 MG/DL (ref 0.6–1.3)
MAGNESIUM SERPL-MCNC: 1.9 MG/DL (ref 1.3–2.7)
PHOSPHATE SERPL-MCNC: 3.2 MG/DL (ref 2.4–5.1)

## 2017-08-18 PROCEDURE — 82565 ASSAY OF CREATININE: CPT

## 2017-08-18 PROCEDURE — 36415 COLL VENOUS BLD VENIPUNCTURE: CPT

## 2017-08-18 PROCEDURE — 99214 OFFICE O/P EST MOD 30 MIN: CPT | Performed by: INTERNAL MEDICINE

## 2017-08-18 PROCEDURE — 84100 ASSAY OF PHOSPHORUS: CPT

## 2017-08-18 PROCEDURE — 96374 THER/PROPH/DIAG INJ IV PUSH: CPT

## 2017-08-18 PROCEDURE — 83735 ASSAY OF MAGNESIUM: CPT

## 2017-08-18 PROCEDURE — 25010000002 ZOLEDRONIC ACID PER 1 MG: Performed by: NURSE PRACTITIONER

## 2017-08-18 RX ORDER — SODIUM CHLORIDE 9 MG/ML
250 INJECTION, SOLUTION INTRAVENOUS ONCE
Status: DISCONTINUED | OUTPATIENT
Start: 2017-08-18 | End: 2017-08-18 | Stop reason: HOSPADM

## 2017-08-18 RX ORDER — SODIUM CHLORIDE 9 MG/ML
250 INJECTION, SOLUTION INTRAVENOUS ONCE
Status: CANCELLED | OUTPATIENT
Start: 2017-08-18

## 2017-08-18 RX ORDER — TRAMADOL HYDROCHLORIDE 50 MG/1
50 TABLET ORAL NIGHTLY PRN
Qty: 30 TABLET | Refills: 2 | OUTPATIENT
Start: 2017-08-18 | End: 2017-01-01 | Stop reason: HOSPADM

## 2017-08-18 RX ORDER — LIDOCAINE 40 MG/G
CREAM TOPICAL AS NEEDED
COMMUNITY

## 2017-08-18 RX ADMIN — ZOLEDRONIC ACID 4 MG: 4 INJECTION, SOLUTION, CONCENTRATE INTRAVENOUS at 11:45

## 2017-08-18 NOTE — PROGRESS NOTES
DATE OF VISIT: 8/18/2017    REASON FOR VISIT: Followup for multiple myeloma, M-spike at diagnosis 2.4,  kappa restricted, with diffuse lytic lesions.      HISTORY OF PRESENT ILLNESS: The patient is a very pleasant 70-year-old  gentleman with past medical history significant for multiple myeloma diagnosed  11/26/2013. The patient presented with back pain, had a CAT scan done that  showed multiple vertebral body fractures with lytic lesions. He had a T11  biopsy done that came back with plasmocytosis compatible with multiple myeloma.  The patient was referred to me on 12/03/2013. I did a bone marrow biopsy that  came back consistent with multiple myeloma. Cytogenics at this point are still  pending. Whole body bone survey showed multiple bony lytic lesions affecting  right humerus, vertebral bodies, as well as pelvic bones. The patient had  serum protein electrophoresis done at diagnosis that showed monoclonal protein  of 2.5 grams plus 0.5 grams, kappa restricted. 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 The  patient was started on Revlimid 1.2 mg/sq m subcu once weekly 2 weeks on and 1  week off, Velcade 25 mg p.o. daily 2 weeks on and 1 week off, and dexamethasone  40 mg p.o. weekly on 12/09/2013. The patient completed cycle #6 on 04/07/2014.  The patient received autologous stem cell transplant with Dr. Bry Johnson at  [facility] on 05/13/2014. His day 100 was 08/26/2014. He had serum  protein electrophoresis that failed to show any evidence of monoclonal protein,  serum free light chain ratio was slightly elevated. The patient was started on  Revlimid 15 mg p.o. daily 2 weeks on and 1 week off on 06/16/2015. The patient's therapy was changed to Ninlaro 4 mg weekly 3 weeks on and 1 week off due to rising kappa lambda ratio. His kappa lambda ration continued rise of Ninlaro and he was started on Decadron 40 mg weekly with Revlimid 25 mg 2  weeks on and 1 week off on 4/14/2017.  The patient is here today in scheduled followup visit.    SUBJECTIVE: The patient has been doing fairly well. he was able to tolerate  his treatment without any serious side effects. he denied any fever or  chills, no night sweats, denied any headaches. No bleeding.  He is complaining of insomnia on days of Decadron.  He has mild fatigue.  He is gaining weight.  He is complaining of pain in bilateral lower ribs started after initiating Zometa.    PAST MEDICAL HISTORY/SOCIAL HISTORY/FAMILY HISTORY: Unchanged from my prior documentation done on 12/03/2013.    Review of Systems   Constitutional: Positive for fatigue. Negative for activity change, appetite change, chills, fever and unexpected weight change.   HENT: Negative for hearing loss, mouth sores, nosebleeds, sore throat and trouble swallowing.    Eyes: Negative for visual disturbance.   Respiratory: Negative for cough, chest tightness, shortness of breath and wheezing.    Cardiovascular: Negative for chest pain, palpitations and leg swelling.   Gastrointestinal: Negative for abdominal distention, abdominal pain, blood in stool, constipation, diarrhea, nausea, rectal pain and vomiting.   Endocrine: Negative for cold intolerance and heat intolerance.   Genitourinary: Negative for difficulty urinating, dysuria, frequency and urgency.   Musculoskeletal: Positive for arthralgias. Negative for back pain, gait problem, joint swelling and myalgias.   Skin: Negative for rash.   Neurological: Positive for numbness. Negative for dizziness, tremors, syncope, weakness, light-headedness and headaches.   Hematological: Negative for adenopathy. Does not bruise/bleed easily.   Psychiatric/Behavioral: Negative for confusion, sleep disturbance and suicidal ideas. The patient is not nervous/anxious.          Current Outpatient Prescriptions:   •  lidocaine (LMX) 4 % cream, Apply  topically As Needed for Mild Pain ., Disp: , Rfl:   •  aspirin  81 MG tablet, Take 81 mg by mouth Daily., Disp: , Rfl:   •  azelastine (ASTELIN) 0.1 % nasal spray, into each nostril 2 (two) times a day., Disp: , Rfl:   •  Calcium Carb-Cholecalciferol (CALCIUM + D3) 600-200 MG-UNIT tablet, Take  by mouth., Disp: , Rfl:   •  dexamethasone (DECADRON) 4 MG tablet, Take 10 tablets by mouth Daily With Breakfast. Take with food on Days 1, 8, 15 and 22., Disp: 40 tablet, Rfl: 3  •  divalproex (DEPAKOTE) 500 MG 24 hr tablet, Take 2 tablets by mouth Daily., Disp: 90 tablet, Rfl: 5  •  docusate sodium (DOCQLACE) 100 MG capsule, Take  by mouth., Disp: , Rfl:   •  folic acid (FOLVITE) 1 MG tablet, TK 1 T PO QD, Disp: , Rfl: 5  •  gabapentin (NEURONTIN) 300 MG capsule, Take 1 capsule by mouth 3 (Three) Times a Day., Disp: 90 capsule, Rfl: 5  •  Glucosamine-Chondroitin (OSTEO BI-FLEX REGULAR STRENGTH) 250-200 MG tablet, Take 1 tablet by mouth 2 (Two) Times a Day., Disp: , Rfl:   •  lenalidomide (REVLIMID) 25 MG capsule, Take 1 capsule by mouth Daily. Daily for 14 days on, 7 days off. AUTH: [id number] Adult Male, Disp: 14 capsule, Rfl: 0  •  levETIRAcetam (KEPPRA) 1000 MG tablet, Take 1 tablet by mouth Daily. PATIENT TAKING 1.5 TAB TWICE A DAY (Patient taking differently: Take 500 mg by mouth 2 (Two) Times a Day. PATIENT TAKING 1/2 TAB TWICE A DAY), Disp: 90 tablet, Rfl: 6  •  loratadine (CLARITIN) 10 MG tablet, Take  by mouth., Disp: , Rfl:   •  methylphenidate (CONCERTA) 18 MG CR tablet, Take 1 tablet by mouth Daily., Disp: , Rfl:   •  methylphenidate (CONCERTA) 36 MG CR tablet, Take 18 mg by mouth Every Morning., Disp: , Rfl:   •  NINLARO 4 MG capsule, Take 4 mg by mouth 1 (One) Time Per Week. 3 weeks on, 1 week off, Disp: 9 capsule, Rfl: 5  •  ondansetron (ZOFRAN) 8 MG tablet, Take 1 tablet by mouth 3 (Three) Times a Day As Needed for Nausea or Vomiting., Disp: 30 tablet, Rfl: 5  •  pantoprazole (PROTONIX) 40 MG EC tablet, TAKE 1 TABLET BY MOUTH EVERY DAY, Disp: 30 tablet, Rfl: 5  •  Probiotic  "Product (PROBIOTIC PO), Take 1 tablet by mouth 2 (Two) Times a Day As Needed., Disp: , Rfl:   •  sulfamethoxazole-trimethoprim (BACTRIM DS,SEPTRA DS) 800-160 MG per tablet, Take 1 tablet by mouth 2 (Two) Times a Day. One tablet twice a day on saturdays and sundays for prophylaxis, Disp: 60 tablet, Rfl: 3  •  Valerian Root 450 MG capsule, Take  by mouth., Disp: , Rfl:   •  vitamin B-12 (CYANOCOBALAMIN) 500 MCG tablet, Take 500 mcg by mouth Daily., Disp: , Rfl:   •  warfarin (COUMADIN) 5 MG tablet, Take 5 mg by mouth As Needed., Disp: , Rfl:   No current facility-administered medications for this visit.     Facility-Administered Medications Ordered in Other Visits:   •  sodium chloride 0.9 % infusion 250 mL, 250 mL, Intravenous, Once, CHRISTY Lemus  •  zoledronic acid (ZOMETA) 4 mg in sodium chloride 0.9 % 100 mL IVPB, 4 mg, Intravenous, Once, Susan Coronado APRN    PHYSICAL EXAMINATION:   /73 Comment: LUE  Pulse 86  Temp 97.6 °F (36.4 °C) (Temporal Artery )   Resp 20  Ht 70\" (177.8 cm)  Wt 292 lb (132 kg)  BMI 41.9 kg/m2   ECOG Performance Status: 1 - Symptomatic but completely ambulatory  General Appearance:  alert, cooperative, no apparent distress and appears stated age   Neurologic/Psychiatric: A&O x 3, gait steady, appropriate affect, strength 5/5 in all muscle groups   HEENT:  Normocephalic, without obvious abnormality, mucous membranes moist   Neck: Supple, symmetrical, trachea midline, no adenopathy;  No thyromegaly, masses, or tenderness   Lungs:   Clear to auscultation bilaterally; respirations regular, even, and unlabored bilaterally   Heart:  Regular rate and rhythm, no murmurs appreciated   Abdomen:   Soft, non-tender, non-distended and no organomegaly   Lymph nodes: No cervical, supraclavicular, inguinal or axillary adenopathy noted   Extremities: Normal, atraumatic; no clubbing, cyanosis, or edema    Skin: No rashes, ulcers, or suspicious lesions noted     Infusion on 08/18/2017 "   Component Date Value Ref Range Status   • Creatinine 08/18/2017 0.80  0.60 - 1.30 mg/dL Final    Serial Number: [id number]:  [id number]   Lab on 08/15/2017   Component Date Value Ref Range Status   • Glucose 08/15/2017 127* 70 - 100 mg/dL Final   • BUN 08/15/2017 23  9 - 23 mg/dL Final   • Creatinine 08/15/2017 0.90  0.60 - 1.30 mg/dL Final   • Sodium 08/15/2017 141  132 - 146 mmol/L Final   • Potassium 08/15/2017 4.1  3.5 - 5.5 mmol/L Final   • Chloride 08/15/2017 106  99 - 109 mmol/L Final   • CO2 08/15/2017 27.0  20.0 - 31.0 mmol/L Final   • Calcium 08/15/2017 9.2  8.7 - 10.4 mg/dL Final   • Total Protein 08/15/2017 6.0  5.7 - 8.2 g/dL Final   • Albumin 08/15/2017 4.00  3.20 - 4.80 g/dL Final   • ALT (SGPT) 08/15/2017 27  7 - 40 U/L Final   • AST (SGOT) 08/15/2017 19  0 - 33 U/L Final   • Alkaline Phosphatase 08/15/2017 75  25 - 100 U/L Final   • Total Bilirubin 08/15/2017 0.4  0.3 - 1.2 mg/dL Final   • eGFR Non African Amer 08/15/2017 83  >60 mL/min/1.73 Final   • Globulin 08/15/2017 2.0  gm/dL Final   • A/G Ratio 08/15/2017 2.0  1.5 - 2.5 g/dL Final   • BUN/Creatinine Ratio 08/15/2017 25.6* 7.0 - 25.0 Final   • Anion Gap 08/15/2017 8.0  3.0 - 11.0 mmol/L Final   • TSH 08/15/2017 0.704  0.350 - 5.350 mIU/mL Final   • T3 Uptake 08/15/2017 38.3* 23.0 - 37.0 % Final   • T4, Total 08/15/2017 4.70  4.70 - 11.40 mcg/dL Final   • Free Thyroxine Index 08/15/2017 1.8  TBI Final   • WBC 08/15/2017 6.30  3.50 - 10.80 10*3/mm3 Final   • RBC 08/15/2017 4.05* 4.20 - 5.76 10*6/mm3 Final   • Hemoglobin 08/15/2017 12.4* 13.1 - 17.5 g/dL Final   • Hematocrit 08/15/2017 39.3  38.9 - 50.9 % Final   • RDW 08/15/2017 16.6* 11.3 - 14.5 % Final   • MCV 08/15/2017 97.1  80.0 - 99.0 fL Final   • MCH 08/15/2017 30.6  27.0 - 31.0 pg Final   • MCHC 08/15/2017 31.5* 32.0 - 36.0 g/dL Final   • MPV 08/15/2017 8.6  6.0 - 12.0 fL Final   • Platelets 08/15/2017 154  150 - 450 10*3/mm3 Final   • Neutrophil % 08/15/2017 77.2* 41.0 - 71.0 % Final    • Lymphocyte % 08/15/2017 16.0* 24.0 - 44.0 % Final   • Monocyte % 08/15/2017 6.8  0.0 - 12.0 % Final   • Neutrophils, Absolute 08/15/2017 4.90  1.50 - 8.30 10*3/mm3 Final   • Lymphocytes, Absolute 08/15/2017 1.00  0.60 - 4.80 10*3/mm3 Final   • Monocytes, Absolute 08/15/2017 0.40  0.00 - 1.00 10*3/mm3 Final        No results found.    ASSESSMENT: The patient is a very pleasant 71-year-old gentleman with new  diagnosis of multiple myeloma.    PROBLEM LIST:  1. Multiple myeloma, kappa restricted, M-spike 2.4 at diagnosis, ISS score  stage 2 with normal albumin but beta-2 microglobulin level of 4.1.   2. 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   3. Completed 6 cycles of Revlimid, Velcade, and dexamethasone from 12/09/2013  until 04/07/2014.  4. Received palliative radiation to T11, 4 treatments, completed 12/11/2013.   5. Status post autologous stem cell transplant done at [facility]  with Dr. Bry Johnson 05/13/2014.  6. Relapsed disease documented with elevated kappa lambda ratio with minute  amount of monoclonal protein done on 06/09/2015.   7. Bilateral pulmonary embolism, deep venous thrombosis 07/2014 on chronic  anticoagulation with Coumadin.  8. Chronic kidney disease Stage II.   9. 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   10. Started Revlimid 25 mg p.o. daily 2 weeks on and 1 week off on 06/16/2015.  11. Switched to Ninlaro on 02/21/2017 Secondary to increase in SFL ratio.  12. Added Decadron 40 mg weekly with Revlimid 25 mg 2 weeks on 1 week off on 4/14/2017 secondary to rising SFL ration on Ninlaro alone.   13. Partial seizure disorder.   14. Leukopenia and thrombocytopenia.   15. Peripheral neuropathy    PLAN:  1. I did go over the results in detail with the patient. I explained to him  that he still does not have any evidence of monoclonal protein;  his  Kappa lambda light chain ratio is Stable.     2. We will continue Ninlaro 4 mg weekly 3 weeks on and 1 week off.   3. We will continue Decadron however I will go on the dose to 20 mg weekly, we'll continue Revlimid 25 mg 2 weeks on 1 week off.  4. The patient will come back to see me in 6 weeks with repeat blood work  prior to return, including serum free light chains.   5. We will continue folic acid daily. I am suspecting his leukopenia and thrombocytopenia is secondary to Depakote and Keppra.   6. I will continue warfarin for history of DVT with monoclonal gammopathy.   7.  The patient is still on Neurontin for peripheral neuropathy. He will follow up with his neurologist regarding management.   8.  I reviewed the potential side effects of Ninlaro and Revlimid including but not limited to peripheral neuropathy, edema, rash, diarrhea, low blood counts,hepatic insufficiency, and potential death.   9.  The patient will continue to follow up with Dr. Reyes at [facility] regarding whether the patient may be a candidate for a second autologous stem cell transplant.  10.  I will continue the patient on Bactrim DS twice a day on Saturdays and Sundays for prophylaxis since patient was given high-dose steroids.  11.  I will continue Protonix 40 mg daily for GI prophylaxis.  12.  The patient will continue Zometa 4 mg IV every 6 weeks with calcium and vitamin D due to mild diffuse lytic lesions seen on skeletal survey done 2/21/2017.    13.  I will start patient on tramadol 50 mg daily at bedtime as needed for cancer-related pain.    Scribed for Rosalie Segovia MD by Susan Coronado, CHRISTY. 8/18/2017  11:41 AM  Rosalie Segovia MD  8/18/2017 8/18/2017  11:41 AM  I, Rosalie Segovia MD, personally performed the services described in this documentation as scribed by the above named individual in my presence, and it is both accurate and complete.  8/18/2017  11:41 AM

## 2017-08-19 LAB
KAPPA LC SERPL-MCNC: 117 MG/L (ref 3.3–19.4)
KAPPA LC/LAMBDA SER: 16.96 {RATIO} (ref 0.26–1.65)
LAMBDA LC FREE SERPL-MCNC: 6.9 MG/L (ref 5.7–26.3)

## 2017-08-22 NOTE — PROGRESS NOTES
Oral Chemotherapy Teaching      Patient Name/:  Johnny Ramirez   1945  Oral Chemotherapy Regimen: Ixazomib 4mg PO on days 1,8,15 + Lenalidomide 25mg PO x 14 days, followed by 7 days off + Dex 20mg (dose reduced) weekly  Date Started Medication: Cycle 7 of Ixazomib due on 17, will start next on cycle of lenalidomide on 17 - confirmed with patient     Initial Teaching Follow Up Comments     Safety     Storage instructions (away from children; away from heat/cold, sunlight, or moisture), handling - use of gloves (caregivers), washing hands after touching pills, managing waste     “How are you storing your medications?”, reminders on storage, proper handling (caregivers using gloves, washing hands, away from children, managing waste, etc.), disposal of medication with D/C or dosage change         Adherence      patient and/or caregiver on how to take medication, take with/without food, assess their adherence potential, stress importance of adherence, ways to manage adherence (pill boxes, phone reminders, calendars), what to do if miss a dose   “How are you taking your medication?” “How are you remembering to take your medication?”, “How many doses have you missed?”, determine reasons for non-adherence (not remembering, side effects, etc), ways to improve, overadherence? Remind patient of ways to improve/maintain adherence   Reports appropriate adherence. UTilizing calendar and able to confirm start dates of medication as appropriate. Will send updated calendar with reduced dose of weekly Dex 20mg changed at most recent clinic appt. Pt reports receiving call from [facility] regarding shipment of his meds for next fill. Lenalidomide sent in by RN on 17 for next cycle.      Side Effects/Adverse Reactions      patient on potential side effects, s/s, ways to manage, when to call MD/seek help     Determine if patient experiencing side effects, ways to manage  Patient reports recently starting  tramadol at bedtime and reports some drowsiness with medication. Inquiring if medication can be split to reduce dose to 25mg. Immediate release tablets can be split, however if extended release tablets prescribed, cannot be cut or split. Pt plans to talk with  regarding issue and follow up with dispensing pharmacy to ensure tablet formulation.      Miscellaneous     Food interactions, DDIs, financial issues Determine if patient started any new medications since being placed on oral chemo (analyze for DDI)      Additional Notes: Will mail patient updated calendar. Answered all questions and will provided contact information.     9/22/17: called patient for follow-up, no-answer LVM  9/28/17: called patient for follow-up, no-answer, LVM

## 2017-09-28 NOTE — PROGRESS NOTES
Oral Chemotherapy Teaching      Patient Name/:  Johnny Ramirez   1945  Oral Chemotherapy Regimen: Ixazomib 4mg PO on days 1,8,15 + Lenalidomide 25mg PO x 14 days, followed by 7 days off + Dex 20mg (dose reduced) weekly  Date Started Medication: Cycle 8 of Ixazomib  17, will start next on cycle of lenalidomide on 10/9/17 - confirmed with patient     Initial Teaching Follow Up Comments     Safety     Storage instructions (away from children; away from heat/cold, sunlight, or moisture), handling - use of gloves (caregivers), washing hands after touching pills, managing waste     “How are you storing your medications?”, reminders on storage, proper handling (caregivers using gloves, washing hands, away from children, managing waste, etc.), disposal of medication with D/C or dosage change         Adherence      patient and/or caregiver on how to take medication, take with/without food, assess their adherence potential, stress importance of adherence, ways to manage adherence (pill boxes, phone reminders, calendars), what to do if miss a dose   “How are you taking your medication?” “How are you remembering to take your medication?”, “How many doses have you missed?”, determine reasons for non-adherence (not remembering, side effects, etc), ways to improve, overadherence? Remind patient of ways to improve/maintain adherence   Reports appropriate adherence. UTilizing calendar and able to confirm start dates of medication as appropriate. Will send updated calendar with reduced dose of weekly Dex 20mg changed at most recent clinic appt. Pt reports he will finish his 14 days on of lenalidomide on 10/1/17. Will submit [facility] for next round of lenalidomide. Will mail out calendar.       Side Effects/Adverse Reactions      patient on potential side effects, s/s, ways to manage, when to call MD/seek help     Determine if patient experiencing side effects, ways to  manage  Patient reporting the following noticeable side effects: headaches, taste changes, aches and pains (reports they are relieved by massage therpy), increased trouble with acid reflux noted. Reports he thinks this is related to change in taste which has pushed him towards more spicy foods, thus noted increased heartburn. Also reports nausea a couple of times in the last few weeks requiring zofran. Night time leg cramps have re-appeared, reports eating banana previously and helping with issue at hand. Discussed hydration and potentially use of gatorade or tonic water.      Miscellaneous     Food interactions, DDIs, financial issues Determine if patient started any new medications since being placed on oral chemo (analyze for DDI)      Additional Notes: Will mail patient calendar. Submitted new script to [facility]. Pt with follow-up MD appt on 10/2/17

## 2017-09-28 NOTE — PROGRESS NOTES
17 @ 12:58-Good Afternoon,   I have submitted a refill Rx for Johnny Ramirez’s (:1945) Lenalidomide 25mg PO days 1-14, followed by 7 days off Qty# 14 with 0 RF.  Please let me know if you need anything additional.   Thanks  Charlotte Thurman, PharmD  17 @ 8:16-Good Morning![facility]  I just wanted to do a follow up before the weekend on status?  Thank you!  Joe  17 @ 9 am - Copay $36.96 and we left a message yesterday to schedule the shipment.  I will have him called again today and will let you know if we are able to reach him.

## 2017-10-02 NOTE — PROGRESS NOTES
DATE OF VISIT: 10/2/2017    REASON FOR VISIT: Followup for multiple myeloma, M-spike at diagnosis 2.4,  kappa restricted, with diffuse lytic lesions.      HISTORY OF PRESENT ILLNESS: The patient is a very pleasant 70-year-old  gentleman with past medical history significant for multiple myeloma diagnosed  11/26/2013. The patient presented with back pain, had a CAT scan done that  showed multiple vertebral body fractures with lytic lesions. He had a T11  biopsy done that came back with plasmocytosis compatible with multiple myeloma.  The patient was referred to me on 12/03/2013. I did a bone marrow biopsy that  came back consistent with multiple myeloma. Cytogenics at this point are still  pending. Whole body bone survey showed multiple bony lytic lesions affecting  right humerus, vertebral bodies, as well as pelvic bones. The patient had  serum protein electrophoresis done at diagnosis that showed monoclonal protein  of 2.5 grams plus 0.5 grams, kappa restricted. 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 The  patient was started on Revlimid 1.2 mg/sq m subcu once weekly 2 weeks on and 1  week off, Velcade 25 mg p.o. daily 2 weeks on and 1 week off, and dexamethasone  40 mg p.o. weekly on 12/09/2013. The patient completed cycle #6 on 04/07/2014.  The patient received autologous stem cell transplant with Dr. Bry Johnson at  [facility] on 05/13/2014. His day 100 was 08/26/2014. He had serum  protein electrophoresis that failed to show any evidence of monoclonal protein,  serum free light chain ratio was slightly elevated. The patient was started on  Revlimid 15 mg p.o. daily 2 weeks on and 1 week off on 06/16/2015. The patient's therapy was changed to Ninlaro 4 mg weekly 3 weeks on and 1 week off due to rising kappa lambda ratio. His kappa lambda ration continued rise of Ninlaro and he was started on Decadron 40 mg weekly with Revlimid 25 mg 2  weeks on and 1 week off on 4/14/2017.  The patient is here today in scheduled followup visit.    SUBJECTIVE: The patient has been doing fairly well. he was able to tolerate  his treatment without any serious side effects. he denied any fever or  chills, no night sweats, denied any headaches. No bleeding.  He is complaining of insomnia on days of Decadron.  He has mild fatigue.  He is gaining weight.  He is complaining of pain in bilateral lower ribs started after initiating Zometa.    PAST MEDICAL HISTORY/SOCIAL HISTORY/FAMILY HISTORY: Unchanged from my prior documentation done on 12/03/2013.    Review of Systems   Constitutional: Positive for fatigue. Negative for activity change, appetite change, chills, fever and unexpected weight change.   HENT: Negative for hearing loss, mouth sores, nosebleeds, sore throat and trouble swallowing.    Eyes: Negative for visual disturbance.   Respiratory: Negative for cough, chest tightness, shortness of breath and wheezing.    Cardiovascular: Negative for chest pain, palpitations and leg swelling.   Gastrointestinal: Negative for abdominal distention, abdominal pain, blood in stool, constipation, diarrhea, nausea, rectal pain and vomiting.   Endocrine: Negative for cold intolerance and heat intolerance.   Genitourinary: Negative for difficulty urinating, dysuria, frequency and urgency.   Musculoskeletal: Positive for arthralgias. Negative for back pain, gait problem, joint swelling and myalgias.   Skin: Negative for rash.   Neurological: Positive for numbness. Negative for dizziness, tremors, syncope, weakness, light-headedness and headaches.   Hematological: Negative for adenopathy. Does not bruise/bleed easily.   Psychiatric/Behavioral: Negative for confusion, sleep disturbance and suicidal ideas. The patient is not nervous/anxious.          Current Outpatient Prescriptions:   •  aspirin 81 MG tablet, Take 81 mg by mouth Daily., Disp: , Rfl:   •  azelastine (ASTELIN) 0.1 %  nasal spray, into each nostril 2 (two) times a day., Disp: , Rfl:   •  Calcium Carb-Cholecalciferol (CALCIUM + D3) 600-200 MG-UNIT tablet, Take  by mouth., Disp: , Rfl:   •  dexamethasone (DECADRON) 4 MG tablet, TAKE 10 TABLETS BY MOUTH WITH BREAKFAST, ON DAYS 1, 8, 15 AND 22, Disp: 40 tablet, Rfl: 5  •  divalproex (DEPAKOTE) 500 MG 24 hr tablet, Take 2 tablets by mouth Daily., Disp: 90 tablet, Rfl: 5  •  docusate sodium (DOCQLACE) 100 MG capsule, Take  by mouth., Disp: , Rfl:   •  folic acid (FOLVITE) 1 MG tablet, TK 1 T PO QD, Disp: , Rfl: 5  •  gabapentin (NEURONTIN) 300 MG capsule, Take 1 capsule by mouth 3 (Three) Times a Day., Disp: 90 capsule, Rfl: 5  •  Glucosamine-Chondroitin (OSTEO BI-FLEX REGULAR STRENGTH) 250-200 MG tablet, Take 1 tablet by mouth 2 (Two) Times a Day., Disp: , Rfl:   •  lenalidomide (REVLIMID) 25 MG capsule, Take 1 capsule by mouth Daily. Daily for 14 days on, 7 days off. AUTH: [id number] Adult Male, Disp: 14 capsule, Rfl: 0  •  lenalidomide (REVLIMID) 25 MG capsule, Take 1 capsule by mouth Daily. for 14 days on, followed by 7 days off. REMS [id number] Adult Male, Disp: 14 capsule, Rfl: 0  •  levETIRAcetam (KEPPRA) 1000 MG tablet, Take 1 tablet by mouth Daily. PATIENT TAKING 1.5 TAB TWICE A DAY (Patient taking differently: Take 500 mg by mouth 2 (Two) Times a Day. PATIENT TAKING 1/2 TAB TWICE A DAY), Disp: 90 tablet, Rfl: 6  •  lidocaine (LMX) 4 % cream, Apply  topically As Needed for Mild Pain ., Disp: , Rfl:   •  loratadine (CLARITIN) 10 MG tablet, Take  by mouth., Disp: , Rfl:   •  methylphenidate (CONCERTA) 18 MG CR tablet, Take 1 tablet by mouth Daily., Disp: , Rfl:   •  methylphenidate (CONCERTA) 36 MG CR tablet, Take 18 mg by mouth Every Morning., Disp: , Rfl:   •  NINLARO 4 MG capsule, Take 4 mg by mouth 1 (One) Time Per Week. 3 weeks on, 1 week off, Disp: 9 capsule, Rfl: 5  •  ondansetron (ZOFRAN) 8 MG tablet, Take 1 tablet by mouth 3 (Three) Times a Day As Needed for Nausea or  Vomiting., Disp: 30 tablet, Rfl: 5  •  pantoprazole (PROTONIX) 40 MG EC tablet, TAKE 1 TABLET BY MOUTH EVERY DAY, Disp: 30 tablet, Rfl: 5  •  Probiotic Product (PROBIOTIC PO), Take 1 tablet by mouth 2 (Two) Times a Day As Needed., Disp: , Rfl:   •  sulfamethoxazole-trimethoprim (BACTRIM DS,SEPTRA DS) 800-160 MG per tablet, Take 1 tablet by mouth 2 (Two) Times a Day. One tablet twice a day on saturdays and sundays for prophylaxis, Disp: 60 tablet, Rfl: 3  •  traMADol (ULTRAM) 50 MG tablet, Take 1 tablet by mouth At Night As Needed for Moderate Pain  (pain)., Disp: 30 tablet, Rfl: 2  •  Valerian Root 450 MG capsule, Take  by mouth., Disp: , Rfl:   •  vitamin B-12 (CYANOCOBALAMIN) 500 MCG tablet, Take 500 mcg by mouth Daily., Disp: , Rfl:   •  warfarin (COUMADIN) 5 MG tablet, Take 5 mg by mouth As Needed., Disp: , Rfl:     PHYSICAL EXAMINATION:   There were no vitals taken for this visit.   ECOG Performance Status: 1 - Symptomatic but completely ambulatory  General Appearance:  alert, cooperative, no apparent distress and appears stated age   Neurologic/Psychiatric: A&O x 3, gait steady, appropriate affect, strength 5/5 in all muscle groups   HEENT:  Normocephalic, without obvious abnormality, mucous membranes moist   Neck: Supple, symmetrical, trachea midline, no adenopathy;  No thyromegaly, masses, or tenderness   Lungs:   Clear to auscultation bilaterally; respirations regular, even, and unlabored bilaterally   Heart:  Regular rate and rhythm, no murmurs appreciated   Abdomen:   Soft, non-tender, non-distended and no organomegaly   Lymph nodes: No cervical, supraclavicular, inguinal or axillary adenopathy noted   Extremities: Normal, atraumatic; no clubbing, cyanosis, or edema    Skin: No rashes, ulcers, or suspicious lesions noted     Lab on 09/28/2017   Component Date Value Ref Range Status   • Glucose 09/28/2017 102* 70 - 100 mg/dL Final   • BUN 09/28/2017 17  9 - 23 mg/dL Final   • Creatinine 09/28/2017 0.90   0.60 - 1.30 mg/dL Final   • Sodium 09/28/2017 141  132 - 146 mmol/L Final   • Potassium 09/28/2017 4.4  3.5 - 5.5 mmol/L Final   • Chloride 09/28/2017 107  99 - 109 mmol/L Final   • CO2 09/28/2017 31.0  20.0 - 31.0 mmol/L Final   • Calcium 09/28/2017 8.9  8.7 - 10.4 mg/dL Final   • Total Protein 09/28/2017 5.7  5.7 - 8.2 g/dL Final   • Albumin 09/28/2017 3.80  3.20 - 4.80 g/dL Final   • ALT (SGPT) 09/28/2017 1318* 7 - 40 U/L Final   • AST (SGOT) 09/28/2017 535* 0 - 33 U/L Final   • Alkaline Phosphatase 09/28/2017 169* 25 - 100 U/L Final   • Total Bilirubin 09/28/2017 0.8  0.3 - 1.2 mg/dL Final   • eGFR Non  Amer 09/28/2017 83  >60 mL/min/1.73 Final   • Globulin 09/28/2017 1.9  gm/dL Final   • A/G Ratio 09/28/2017 2.0  1.5 - 2.5 g/dL Final   • BUN/Creatinine Ratio 09/28/2017 18.9  7.0 - 25.0 Final   • Anion Gap 09/28/2017 3.0  3.0 - 11.0 mmol/L Final   • WBC 09/28/2017 2.60* 3.50 - 10.80 10*3/mm3 Final   • RBC 09/28/2017 4.45  4.20 - 5.76 10*6/mm3 Final   • Hemoglobin 09/28/2017 13.6  13.1 - 17.5 g/dL Final   • Hematocrit 09/28/2017 43.3  38.9 - 50.9 % Final   • RDW 09/28/2017 16.5* 11.3 - 14.5 % Final   • MCV 09/28/2017 97.3  80.0 - 99.0 fL Final   • MCH 09/28/2017 30.7  27.0 - 31.0 pg Final   • MCHC 09/28/2017 31.5* 32.0 - 36.0 g/dL Final   • MPV 09/28/2017 9.2  6.0 - 12.0 fL Final   • Platelets 09/28/2017 89* 150 - 450 10*3/mm3 Final   • Neutrophil % 09/28/2017 63.6  41.0 - 71.0 % Final   • Lymphocyte % 09/28/2017 28.1  24.0 - 44.0 % Final   • Monocyte % 09/28/2017 8.3  0.0 - 12.0 % Final   • Neutrophils, Absolute 09/28/2017 1.70  1.50 - 8.30 10*3/mm3 Final   • Lymphocytes, Absolute 09/28/2017 0.70  0.60 - 4.80 10*3/mm3 Final   • Monocytes, Absolute 09/28/2017 0.20  0.00 - 1.00 10*3/mm3 Final        No results found.    ASSESSMENT: The patient is a very pleasant 71-year-old gentleman with new  diagnosis of multiple myeloma.    PROBLEM LIST:  1. Multiple myeloma, kappa restricted, M-spike 2.4 at diagnosis,  ISS score  stage 2 with normal albumin but beta-2 microglobulin level of 4.1.   2. 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   3. Completed 6 cycles of Revlimid, Velcade, and dexamethasone from 12/09/2013  until 04/07/2014.  4. Received palliative radiation to T11, 4 treatments, completed 12/11/2013.   5. Status post autologous stem cell transplant done at [facility]  with Dr. Bry Johnson 05/13/2014.  6. Relapsed disease documented with elevated kappa lambda ratio with minute  amount of monoclonal protein done on 06/09/2015.   7. Bilateral pulmonary embolism, deep venous thrombosis 07/2014 on chronic  anticoagulation with Coumadin.  8. Chronic kidney disease Stage II.   9. 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   10. Started Revlimid 25 mg p.o. daily 2 weeks on and 1 week off on 06/16/2015.  11. Switched to Ninlaro on 02/21/2017 Secondary to increase in SFL ratio.  12. Added Decadron 40 mg weekly with Revlimid 25 mg 2 weeks on 1 week off on 4/14/2017 secondary to rising SFL ration on Ninlaro alone.   13. Partial seizure disorder.   14. Leukopenia and thrombocytopenia.   15. Peripheral neuropathy  16.  Drug induced hepatitis    PLAN:  1. I did go over the results in detail with the patient. I explained to him  that he still does not have any evidence of monoclonal protein; his  Kappa lambda light chain ratio is Stable.     2.  I explained to him that his liver enzymes had significantly increased.  This could be a rare side effects of Ninlaro versus Tylenol.  Patient had stopped taking Tylenol approximately a week ago.  We'll repeat his LFTs later on this week if there are better then we'll resume  Ninlaro 4 mg weekly 3 weeks on and 1 week off.  On the other hand if still high I will stop Ninlaro permanently and switch him to Darzalex.  3. We will continue Decadron however  I will go on the dose to 20 mg weekly, we'll continue Revlimid 25 mg 2 weeks on 1 week off.  4. The patient will come back to see me in 6 weeks with repeat blood work  prior to return, including serum free light chains.   5. We will continue folic acid daily. I am suspecting his leukopenia and thrombocytopenia is secondary to Depakote and Keppra.   6. I will continue warfarin for history of DVT with monoclonal gammopathy.   7.  The patient is still on Neurontin for peripheral neuropathy. He will follow up with his neurologist regarding management.   8.  I reviewed the potential side effects of Ninlaro and Revlimid including but not limited to peripheral neuropathy, edema, rash, diarrhea, low blood counts,hepatic insufficiency, and potential death.   9.  The patient will continue to follow up with Dr. Reyes at [facility] regarding whether the patient may be a candidate for a second autologous stem cell transplant.  10.  I will continue the patient on Bactrim DS twice a day on Saturdays and Sundays for prophylaxis since patient was given high-dose steroids.  11.  I will continue Protonix 40 mg daily for GI prophylaxis.  12.  The patient will continue Zometa 4 mg IV every 6 weeks with calcium and vitamin D due to mild diffuse lytic lesions seen on skeletal survey done 2/21/2017.    13.  I will continue patient on tramadol 50 mg daily at bedtime as needed for cancer-related pain.      Rosalie Segovia MD  10/2/2017   10/2/2017  9:49 AM

## 2017-10-04 NOTE — TELEPHONE ENCOUNTER
VM left advising patient to resume his ninlaro based on his LFTs from today, and to recheck it again next week. To call back if any questions or concerns

## 2017-10-25 NOTE — PROGRESS NOTES
Subjective   Johnny Ramirze is a 72 y.o. male.     Chief Complaint   Patient presents with   • neuropathy       History of Present Illness   Pt f/b Dr Sharma for neuropathy and headaches, first seen here 2/25/16 for possible stroke: difficulty with speech, tongue numb. No limb sx. A few days later developed repetitive jerking in left lower face, later noted on VEEG to persist in sleep, and decreased with Ativan and LVT at ER, stopped completely with VPA. On coumadin for chronic PE, and has multiple myeloma. Saw Dr Archer who concurred with dx of simple partial szs.  Brain MRI showed area of increased T2 signal in the posterior superior right frontal, nonenhancing, which appears on diffusion images to show T2 shine through, difficult to rule out some increase signal on diffusion, but not clearly seen on ADC mapping. Had echo, on asa, CTA OK.  Was on 500 mg bid of both Depakote and Keppra.   Then had a few twitches one morning; we checked a Depakote level which was 40 and planned to increase to 1500 mg. Rearranged dosing time, more tolerable.  MRI 6/27 showed resolution of the right subcortical lesion.  LVT 6.6, VPA 73 (on 1000mg and 1500mg respectively).  Called subsequently due to low blood counts and cut back Depakote to 1000mg. Feeling fine with side effects now, and wbc stable.    Then tiny jump in left upper lip with stress. Quit stressful activity and it went away. Trying acupuncture for foot pain. Also takes GBP. Has been on TPM for a while for migraines, in remission. Some hand tremor with action.  Planned: taper off TPM. Can try decreasing GBP, discussed possibility of Lyrica as a substitute in future.  12/16: Gets pain on bottom of feet, that feels raw/stung, top of toes sting. Can't bear socks.  Tremor occurs with more than one cup of decaffeinated coffee.     Did have tx with Velcade in spring of 2014, then revlimid until tx with stem cell tx. Restarted revlimid in summer 2015.   Had NCS with   Eddie in June 2015, showed mild-mod axonal neuropathy. Repeat NCS/EMG 12/8/16 with Dr. 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  Over time foot sx have gradually worsened, with pain as main sx. CSF protein was 34 in March 2016. No seizures.  Today: some changes in chemo meds: Ninlaro, Revlimid and dexamethasone.  LFTs were elevated, coming back down (reviewed). Stopped tylenol. Feet not too bad lately. GBP works as long as remembers to take it tid.  Tremor of hands is very bothersome, can't use his hands effectively. No facial twitch in recent memory. Taking 1/2 LVT 1000mg bid, and 1v386pg VPA ER at night.  Doesn't know family medical hx, eg re: tremor.    The following portions of the patient's history were reviewed and updated as appropriate: allergies, current medications, past family history, past medical history, past social history, past surgical history and problem list.    Allergies   Allergen Reactions   • Other      mold   • Trichophyton        Current Outpatient Prescriptions on File Prior to Visit   Medication Sig Dispense Refill   • aspirin 81 MG tablet Take 81 mg by mouth Daily.     • azelastine (ASTELIN) 0.1 % nasal spray into each nostril 2 (two) times a day.     • baclofen (LIORESAL) 10 MG tablet TK 1 - 2 TS QHS  2   • Calcium Carb-Cholecalciferol (CALCIUM + D3) 600-200 MG-UNIT tablet Take  by mouth.     • dexamethasone (DECADRON) 4 MG tablet TAKE 10 TABLETS BY MOUTH WITH BREAKFAST, ON DAYS 1, 8, 15 AND 22 40 tablet 5   • divalproex (DEPAKOTE) 500 MG 24 hr tablet Take 2 tablets by mouth Daily. 90 tablet 5   • docusate sodium (DOCQLACE) 100 MG capsule Take  by mouth.     • folic acid (FOLVITE) 1 MG tablet TK 1 T PO QD  5   • gabapentin (NEURONTIN) 300 MG capsule Take 1 capsule by mouth 3 (Three) Times a Day. 90 capsule 5   • Glucosamine-Chondroitin (OSTEO BI-FLEX REGULAR STRENGTH) 250-200 MG tablet Take 1  tablet by mouth 2 (Two) Times a Day.     • lenalidomide (REVLIMID) 25 MG capsule Take 1 capsule by mouth Daily. Daily for 14 days on, 7 days off. AUTH: [id number] Adult Male 14 capsule 0   • levETIRAcetam (KEPPRA) 1000 MG tablet Take 1 tablet by mouth Daily. PATIENT TAKING 1.5 TAB TWICE A DAY (Patient taking differently: Take 500 mg by mouth 2 (Two) Times a Day. PATIENT TAKING 1/2 TAB TWICE A DAY) 90 tablet 6   • lidocaine (LMX) 4 % cream Apply  topically As Needed for Mild Pain .     • loratadine (CLARITIN) 10 MG tablet Take  by mouth.     • methylphenidate (CONCERTA) 18 MG CR tablet Take 1 tablet by mouth Daily.     • methylphenidate (CONCERTA) 36 MG CR tablet Take 18 mg by mouth Every Morning.     • NINLARO 4 MG capsule Take 4 mg by mouth 1 (One) Time Per Week. 3 weeks on, 1 week off 9 capsule 5   • ondansetron (ZOFRAN) 8 MG tablet Take 1 tablet by mouth 3 (Three) Times a Day As Needed for Nausea or Vomiting. 30 tablet 5   • pantoprazole (PROTONIX) 40 MG EC tablet TAKE 1 TABLET BY MOUTH EVERY DAY 30 tablet 5   • Probiotic Product (PROBIOTIC PO) Take 1 tablet by mouth 2 (Two) Times a Day As Needed.     • sulfamethoxazole-trimethoprim (BACTRIM DS,SEPTRA DS) 800-160 MG per tablet Take 1 tablet by mouth 2 (Two) Times a Day. One tablet twice a day on saturdays and sundays for prophylaxis 60 tablet 3   • Valerian Root 450 MG capsule Take  by mouth.     • vitamin B-12 (CYANOCOBALAMIN) 500 MCG tablet Take 500 mcg by mouth Daily.     • warfarin (COUMADIN) 5 MG tablet Take 5 mg by mouth As Needed.       No current facility-administered medications on file prior to visit.        Past Medical History:   Diagnosis Date   • Arthritis    • Stroke        Past Surgical History:   Procedure Laterality Date   • OTHER SURGICAL HISTORY      jaw surgery   • OTHER SURGICAL HISTORY      Open Treatment of Maxillary Alveolar Ridge Fracture   • OTHER SURGICAL HISTORY      tonsilectomy       Social History     Social History   • Marital  "status:      Spouse name: N/A   • Number of children: N/A   • Years of education: N/A     Occupational History   • Not on file.     Social History Main Topics   • Smoking status: Former Smoker   • Smokeless tobacco: Never Used      Comment: Quit in 1986   • Alcohol use No   • Drug use: No   • Sexual activity: Defer     Other Topics Concern   • Not on file     Social History Narrative       Review of Systems   Constitutional: Negative for fever.   Respiratory: Negative for cough and shortness of breath.    Cardiovascular: Negative for chest pain.   Gastrointestinal:        As in hpi   Neurological: Positive for tremors. Negative for seizures.       Objective   Blood pressure 118/68, height 70\" (177.8 cm), weight 290 lb (132 kg).    Physical Exam   Constitutional: He is oriented to person, place, and time. He appears well-developed and well-nourished.   HENT:   Head: Normocephalic and atraumatic.   Eyes: EOM are normal. Pupils are equal, round, and reactive to light.   Pulmonary/Chest: Effort normal.   Musculoskeletal: Normal range of motion.   Neurological: He is oriented to person, place, and time. He has a normal Finger-Nose-Finger Test and a normal Heel to Faust Test.   Skin: Skin is warm and dry.   Psychiatric: His speech is normal.   Nursing note and vitals reviewed.      Neurologic Exam     Mental Status   Oriented to person, place, and time.   Speech: speech is normal   Level of consciousness: alert  Knowledge: consistent with education.   Able to name object. Normal comprehension.     Cranial Nerves     CN II   Visual fields full to confrontation.     CN III, IV, VI   Pupils are equal, round, and reactive to light.  Extraocular motions are normal.     CN VII   Facial expression full, symmetric.     CN IX, X   CN IX normal.   CN X normal.   Palate: symmetric    CN XI   CN XI normal.     CN XII   CN XII normal.     Motor Exam   Muscle bulk: normal  Overall muscle tone: normal  Right arm pronator drift: " absent  Left arm pronator drift: absent    Strength   Strength 5/5 except as noted.     Gait, Coordination, and Reflexes     Gait  Gait: wide-based    Coordination   Finger to nose coordination: normal  Heel to shin coordination: normal    Tremor   Resting tremor: absent  Intention tremor: present  Action tremor: left arm and right arm       Mild postural tremor of right more than left upper extremity, moderate to marked intention tremor on finger-nose-finger       Assessment/Plan     Johnny was seen today for neuropathy.    Diagnoses and all orders for this visit:    Localization-related symptomatic epilepsy and epileptic syndromes with simple partial seizures, not intractable, without status epilepticus  -     Levetiracetam Level (Keppra); Future  -     Valproic Acid Level, Total; Future    Polyneuropathy associated with underlying disease    Tremor    Discussion/Summary:  Long discussion today.  He has done well without simple partial seizures for a long time and we discussed that it is not unlikely that Depakote may be exacerbating or causing his tremor and we will plan a slow taper of that medication after getting a blood level today to guide time course of taper.  We will check a Keppra level for future reference in case that needs to be increased due to breakthrough seizures.  Features of the tremor are concerning for developing essential tremor and we discussed that if not better with Depakote taper, we can start medication-namely primidone.  Discussed risk of seizure recurrence.  He is doing relatively well with foot pain from the neuropathy and can continue the gabapentin as is.  Finally, he had questions about tramadol and seizure threshold and we discussed that at length.  I do not think it would be high risk, but he should not take it while he is tapering Depakote.  30 minutes face-to-face, 20 minutes spent in discussion as above.  Return in about 6 months (around 4/25/2018).    There are no Patient  Instructions on file for this visit.

## 2017-10-27 NOTE — TELEPHONE ENCOUNTER
----- Message from Yakelin Mcclure MD sent at 10/26/2017  5:05 PM EDT -----  pls phone pt. Depakote/valproic acid level came back (on low side), so we don't have to go terribly slowly with tapering it off. I've sent a script in for the 250mg tabs with instructions to go down by one tab every two weeks.

## 2017-10-30 NOTE — TELEPHONE ENCOUNTER
Pt called back I informed him of  instructions, he verbalized understanding and will cab if needed.

## 2017-10-30 NOTE — PROGRESS NOTES
DATE OF VISIT: 10/30/2017    REASON FOR VISIT: Followup for multiple myeloma, M-spike at diagnosis 2.4,  kappa restricted, with diffuse lytic lesions.      HISTORY OF PRESENT ILLNESS: The patient is a very pleasant 70-year-old  gentleman with past medical history significant for multiple myeloma diagnosed  11/26/2013. The patient presented with back pain, had a CAT scan done that  showed multiple vertebral body fractures with lytic lesions. He had a T11  biopsy done that came back with plasmocytosis compatible with multiple myeloma.  The patient was referred to me on 12/03/2013. I did a bone marrow biopsy that  came back consistent with multiple myeloma. Cytogenics at this point are still  pending. Whole body bone survey showed multiple bony lytic lesions affecting  right humerus, vertebral bodies, as well as pelvic bones. The patient had  serum protein electrophoresis done at diagnosis that showed monoclonal protein  of 2.5 grams plus 0.5 grams, kappa restricted. 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 The  patient was started on Revlimid 1.2 mg/sq m subcu once weekly 2 weeks on and 1  week off, Velcade 25 mg p.o. daily 2 weeks on and 1 week off, and dexamethasone  40 mg p.o. weekly on 12/09/2013. The patient completed cycle #6 on 04/07/2014.  The patient received autologous stem cell transplant with Dr. Bry Johnson at  [facility] on 05/13/2014. His day 100 was 08/26/2014. He had serum  protein electrophoresis that failed to show any evidence of monoclonal protein,  serum free light chain ratio was slightly elevated. The patient was started on  Revlimid 15 mg p.o. daily 2 weeks on and 1 week off on 06/16/2015. The patient's therapy was changed to Ninlaro 4 mg weekly 3 weeks on and 1 week off due to rising kappa lambda ratio. His kappa lambda ration continued rise of Ninlaro and he was started on Decadron 40 mg weekly with Revlimid 25 mg 2  weeks on and 1 week off on 4/14/2017.  The patient is here today in scheduled followup visit.    SUBJECTIVE: The patient has been doing fairly well. he was able to tolerate  his treatment without any serious side effects. he denied any fever or  chills, no night sweats, denied any headaches. No bleeding.  He is complaining of insomnia on days of Decadron.  He has mild fatigue.  He is gaining weight.  He is complaining of pain in bilateral lower ribs started after initiating Zometa.    PAST MEDICAL HISTORY/SOCIAL HISTORY/FAMILY HISTORY: Unchanged from my prior documentation done on 12/03/2013.    Review of Systems   Constitutional: Positive for fatigue. Negative for activity change, appetite change, chills, fever and unexpected weight change.   HENT: Negative for hearing loss, mouth sores, nosebleeds, sore throat and trouble swallowing.    Eyes: Negative for visual disturbance.   Respiratory: Negative for cough, chest tightness, shortness of breath and wheezing.    Cardiovascular: Negative for chest pain, palpitations and leg swelling.   Gastrointestinal: Negative for abdominal distention, abdominal pain, blood in stool, constipation, diarrhea, nausea, rectal pain and vomiting.   Endocrine: Negative for cold intolerance and heat intolerance.   Genitourinary: Negative for difficulty urinating, dysuria, frequency and urgency.   Musculoskeletal: Positive for arthralgias. Negative for back pain, gait problem, joint swelling and myalgias.   Skin: Negative for rash.   Neurological: Positive for numbness. Negative for dizziness, tremors, syncope, weakness, light-headedness and headaches.   Hematological: Negative for adenopathy. Does not bruise/bleed easily.   Psychiatric/Behavioral: Negative for confusion, sleep disturbance and suicidal ideas. The patient is not nervous/anxious.          Current Outpatient Prescriptions:   •  amoxicillin-clavulanate (AUGMENTIN) 875-125 MG per tablet, TK 1 T PO Q 12 H FOR 10 DAYS, Disp: ,  Rfl: 0  •  aspirin 81 MG tablet, Take 81 mg by mouth Daily., Disp: , Rfl:   •  azelastine (ASTELIN) 0.1 % nasal spray, into each nostril 2 (two) times a day., Disp: , Rfl:   •  baclofen (LIORESAL) 10 MG tablet, TK 1 - 2 TS QHS, Disp: , Rfl: 2  •  benzonatate (TESSALON) 200 MG capsule, TK ONE C PO  TID FOR 5 DAYS, Disp: , Rfl: 0  •  Calcium Carb-Cholecalciferol (CALCIUM + D3) 600-200 MG-UNIT tablet, Take  by mouth., Disp: , Rfl:   •  dexamethasone (DECADRON) 4 MG tablet, TAKE 10 TABLETS BY MOUTH WITH BREAKFAST, ON DAYS 1, 8, 15 AND 22, Disp: 40 tablet, Rfl: 5  •  divalproex (DEPAKOTE) 250 MG 24 hr tablet, Decrease dose to 750mg nightly for 2 weeks, then 500mg for 2 weeks, then 250mg for 2 weeks, then stop, Disp: 120 tablet, Rfl: 1  •  divalproex (DEPAKOTE) 500 MG 24 hr tablet, Take 2 tablets by mouth Daily., Disp: 90 tablet, Rfl: 5  •  docusate sodium (DOCQLACE) 100 MG capsule, Take  by mouth., Disp: , Rfl:   •  folic acid (FOLVITE) 1 MG tablet, TK 1 T PO QD, Disp: , Rfl: 5  •  gabapentin (NEURONTIN) 300 MG capsule, Take 1 capsule by mouth 3 (Three) Times a Day., Disp: 90 capsule, Rfl: 5  •  Glucosamine-Chondroitin (OSTEO BI-FLEX REGULAR STRENGTH) 250-200 MG tablet, Take 1 tablet by mouth 2 (Two) Times a Day., Disp: , Rfl:   •  lenalidomide (REVLIMID) 25 MG capsule, Take 1 capsule by mouth Daily. Daily for 14 days on, 7 days off. AUTH: [id number] Adult Male, Disp: 14 capsule, Rfl: 0  •  levETIRAcetam (KEPPRA) 1000 MG tablet, Take 1 tablet by mouth Daily. PATIENT TAKING 1.5 TAB TWICE A DAY (Patient taking differently: Take 500 mg by mouth 2 (Two) Times a Day. PATIENT TAKING 1/2 TAB TWICE A DAY), Disp: 90 tablet, Rfl: 6  •  lidocaine (LMX) 4 % cream, Apply  topically As Needed for Mild Pain ., Disp: , Rfl:   •  loratadine (CLARITIN) 10 MG tablet, Take  by mouth., Disp: , Rfl:   •  methylphenidate (CONCERTA) 18 MG CR tablet, Take 1 tablet by mouth Daily., Disp: , Rfl:   •  methylphenidate (CONCERTA) 36 MG CR tablet, Take  "18 mg by mouth Every Morning., Disp: , Rfl:   •  NINLARO 4 MG capsule, Take 4 mg by mouth 1 (One) Time Per Week. 3 weeks on, 1 week off, Disp: 9 capsule, Rfl: 5  •  ondansetron (ZOFRAN) 8 MG tablet, Take 1 tablet by mouth 3 (Three) Times a Day As Needed for Nausea or Vomiting., Disp: 30 tablet, Rfl: 5  •  pantoprazole (PROTONIX) 40 MG EC tablet, TAKE 1 TABLET BY MOUTH EVERY DAY, Disp: 30 tablet, Rfl: 5  •  predniSONE (DELTASONE) 20 MG tablet, TK 1 T PO BID FOR 5 DAYS, Disp: , Rfl: 0  •  PROAIR RESPICLICK 108 (90 Base) MCG/ACT inhaler, INL 2 PFS PO Q 4 H, Disp: , Rfl: 0  •  Probiotic Product (PROBIOTIC PO), Take 1 tablet by mouth 2 (Two) Times a Day As Needed., Disp: , Rfl:   •  promethazine-dextromethorphan (PROMETHAZINE-DM) 6.25-15 MG/5ML syrup, TK 10 MLS PO Q 6 H FOR 5 DAYS, Disp: , Rfl: 0  •  promethazine-phenylephrine 6.25-5 MG/5ML syrup syrup, TK 5 ML PO Q 4 TO 6 H PRN, Disp: , Rfl: 0  •  sulfamethoxazole-trimethoprim (BACTRIM DS,SEPTRA DS) 800-160 MG per tablet, Take 1 tablet by mouth 2 (Two) Times a Day. One tablet twice a day on saturdays and sundays for prophylaxis, Disp: 60 tablet, Rfl: 3  •  Valerian Root 450 MG capsule, Take  by mouth., Disp: , Rfl:   •  vitamin B-12 (CYANOCOBALAMIN) 500 MCG tablet, Take 500 mcg by mouth Daily., Disp: , Rfl:   •  warfarin (COUMADIN) 5 MG tablet, Take 5 mg by mouth As Needed., Disp: , Rfl:     PHYSICAL EXAMINATION:   /78 Comment: LUE  Pulse 87  Temp 97.1 °F (36.2 °C) (Temporal Artery )   Resp 20  Ht 70\" (177.8 cm)  Wt 290 lb (132 kg)  BMI 41.61 kg/m2   ECOG Performance Status: 1 - Symptomatic but completely ambulatory  General Appearance:  alert, cooperative, no apparent distress and appears stated age   Neurologic/Psychiatric: A&O x 3, gait steady, appropriate affect, strength 5/5 in all muscle groups   HEENT:  Normocephalic, without obvious abnormality, mucous membranes moist   Neck: Supple, symmetrical, trachea midline, no adenopathy;  No thyromegaly, " masses, or tenderness   Lungs:   Clear to auscultation bilaterally; respirations regular, even, and unlabored bilaterally   Heart:  Regular rate and rhythm, no murmurs appreciated   Abdomen:   Soft, non-tender, non-distended and no organomegaly   Lymph nodes: No cervical, supraclavicular, inguinal or axillary adenopathy noted   Extremities: Normal, atraumatic; no clubbing, cyanosis, or edema    Skin: No rashes, ulcers, or suspicious lesions noted     Lab on 10/25/2017   Component Date Value Ref Range Status   • Glucose 10/25/2017 102* 70 - 100 mg/dL Final   • BUN 10/25/2017 21  9 - 23 mg/dL Final   • Creatinine 10/25/2017 0.80  0.60 - 1.30 mg/dL Final   • Sodium 10/25/2017 141  132 - 146 mmol/L Final   • Potassium 10/25/2017 4.1  3.5 - 5.5 mmol/L Final   • Chloride 10/25/2017 107  99 - 109 mmol/L Final   • CO2 10/25/2017 29.0  20.0 - 31.0 mmol/L Final   • Calcium 10/25/2017 8.7  8.7 - 10.4 mg/dL Final   • Total Protein 10/25/2017 5.6* 5.7 - 8.2 g/dL Final   • Albumin 10/25/2017 3.60  3.20 - 4.80 g/dL Final   • ALT (SGPT) 10/25/2017 30  7 - 40 U/L Final   • AST (SGOT) 10/25/2017 20  0 - 33 U/L Final   • Alkaline Phosphatase 10/25/2017 116* 25 - 100 U/L Final   • Total Bilirubin 10/25/2017 0.4  0.3 - 1.2 mg/dL Final   • eGFR Non  Amer 10/25/2017 96  >60 mL/min/1.73 Final   • Globulin 10/25/2017 2.0  gm/dL Final   • A/G Ratio 10/25/2017 1.8  1.5 - 2.5 g/dL Final   • BUN/Creatinine Ratio 10/25/2017 26.3* 7.0 - 25.0 Final   • Anion Gap 10/25/2017 5.0  3.0 - 11.0 mmol/L Final   • Free Light Chain, [employer] 10/25/2017 220.7* 3.3 - 19.4 mg/L Final   • Free Lambda Light Chains 10/25/2017 9.5  5.7 - 26.3 mg/L Final   • Kappa/Lambda Ratio 10/25/2017 23.23* 0.26 - 1.65 Final   • WBC 10/25/2017 5.20  3.50 - 10.80 10*3/mm3 Final   • RBC 10/25/2017 4.19* 4.20 - 5.76 10*6/mm3 Final   • Hemoglobin 10/25/2017 12.9* 13.1 - 17.5 g/dL Final   • Hematocrit 10/25/2017 41.3  38.9 - 50.9 % Final   • RDW 10/25/2017 19.4* 11.3 - 14.5 %  Final   • MCV 10/25/2017 98.5  80.0 - 99.0 fL Final   • MCH 10/25/2017 30.8  27.0 - 31.0 pg Final   • MCHC 10/25/2017 31.3* 32.0 - 36.0 g/dL Final   • MPV 10/25/2017 7.7  6.0 - 12.0 fL Final   • Platelets 10/25/2017 109* 150 - 450 10*3/mm3 Final   • Neutrophil % 10/25/2017 72.9* 41.0 - 71.0 % Final   • Lymphocyte % 10/25/2017 24.3  24.0 - 44.0 % Final   • Monocyte % 10/25/2017 2.8  0.0 - 12.0 % Final   • Neutrophils, Absolute 10/25/2017 3.80  1.50 - 8.30 10*3/mm3 Final   • Lymphocytes, Absolute 10/25/2017 1.30  0.60 - 4.80 10*3/mm3 Final   • Monocytes, Absolute 10/25/2017 0.10  0.00 - 1.00 10*3/mm3 Final   • Valproic Acid 10/25/2017 45.0* 50.0 - 150.0 mcg/mL Final        No results found.    ASSESSMENT: The patient is a very pleasant 71-year-old gentleman with new  diagnosis of multiple myeloma.    PROBLEM LIST:  1. Multiple myeloma, kappa restricted, M-spike 2.4 at diagnosis, ISS score  stage 2 with normal albumin but beta-2 microglobulin level of 4.1.   2. 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   3. Completed 6 cycles of Revlimid, Velcade, and dexamethasone from 12/09/2013  until 04/07/2014.  4. Received palliative radiation to T11, 4 treatments, completed 12/11/2013.   5. Status post autologous stem cell transplant done at [facility]  with Dr. Bry Johnson 05/13/2014.  6. Relapsed disease documented with elevated kappa lambda ratio with minute  amount of monoclonal protein done on 06/09/2015.   7. Bilateral pulmonary embolism, deep venous thrombosis 07/2014 on chronic  anticoagulation with Coumadin.  8. Chronic kidney disease Stage II.   9. 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   10. Started Revlimid 25 mg p.o. daily 2 weeks on and 1 week off on 06/16/2015.  11. Switched to Ninlaro on 02/21/2017 Secondary to increase in SFL ratio.  12. Added Decadron 40 mg weekly  with Revlimid 25 mg 2 weeks on 1 week off on 4/14/2017 secondary to rising SFL ration on Ninlaro alone.   13. Partial seizure disorder.   14. Leukopenia and thrombocytopenia.   15. Peripheral neuropathy  16.  Drug induced hepatitis    PLAN:  1. I did go over the results in detail with the patient. I explained to him  that he still does not have any evidence of monoclonal protein; his  Kappa lambda light chain ratio is Stable.     2.  I explained to him that his liver enzymes are back to normal  3. We will continue Decadron however I will go on the dose to 20 mg weekly, we'll continue Revlimid 25 mg 2 weeks on 1 week off. I will continue Ninlaro 4 mg weekly 3 weeks on and 1 week off.   4. The patient will come back to see me in 6 weeks with repeat blood work  prior to return, including serum free light chains.   5. We will continue folic acid daily. I am suspecting his leukopenia and thrombocytopenia is secondary to Depakote and Keppra.   6. I will continue warfarin for history of DVT with monoclonal gammopathy.   7.  The patient is still on Neurontin for peripheral neuropathy. He will follow up with his neurologist regarding management.   8.  I reviewed the potential side effects of Ninlaro and Revlimid including but not limited to peripheral neuropathy, edema, rash, diarrhea, low blood counts,hepatic insufficiency, and potential death.   9.  The patient will continue to follow up with Dr. Reyes at [facility] regarding whether the patient may be a candidate for a second autologous stem cell transplant.  10.  I will continue the patient on Bactrim DS twice a day on Saturdays and Sundays for prophylaxis since patient was given high-dose steroids.  11.  I will continue Protonix 40 mg daily for GI prophylaxis.  I will refill it today.  12.  The patient will continue Zometa 4 mg IV every 6 weeks with calcium and vitamin D due to mild diffuse lytic lesions seen on skeletal survey done 2/21/2017.    13.  I will  continue patient on tramadol 50 mg daily at bedtime as needed for cancer-related pain.      Rosalie Segovia MD  10/30/2017   10/30/2017  9:30 AM

## 2017-12-01 NOTE — PROGRESS NOTES
Oral Chemotherapy Teaching      Patient Name/:  Johnny Ramirez   1945  Oral Chemotherapy Regimen: Ixazomib 4mg PO on days 1,8,15 + Lenalidomide 25mg PO x 14 days, followed by 7 days off + Dex 20mg (dose reduced) weekly  Date Started Medication: Cycle 10 of Ixazomib  17, will start next on cycle of lenalidomide on 17- confirmed with patient     Initial Teaching Follow Up Comments     Safety     Storage instructions (away from children; away from heat/cold, sunlight, or moisture), handling - use of gloves (caregivers), washing hands after touching pills, managing waste     “How are you storing your medications?”, reminders on storage, proper handling (caregivers using gloves, washing hands, away from children, managing waste, etc.), disposal of medication with D/C or dosage change         Adherence      patient and/or caregiver on how to take medication, take with/without food, assess their adherence potential, stress importance of adherence, ways to manage adherence (pill boxes, phone reminders, calendars), what to do if miss a dose   “How are you taking your medication?” “How are you remembering to take your medication?”, “How many doses have you missed?”, determine reasons for non-adherence (not remembering, side effects, etc), ways to improve, overadherence? Remind patient of ways to improve/maintain adherence   Reports appropriate adherence. UTilizing calendar and able to confirm start dates of medication as appropriate. Confirms Dex 20mg  Weekly. Pt reports he will finish his 14 days on of lenalidomide on 12/3/17.  Reports he has next cycle of Revlimid ordered and shipped. Reports taking Ninlaro on Thursday. With plans to administer next dose on 17 followed by week off Ninlaro.     Side Effects/Adverse Reactions      patient on potential side effects, s/s, ways to manage, when to call MD/seek help     Determine if patient experiencing side effects, ways to manage  Patient  reporting some noticeable joint pain and acheness after zometa infusions. Does report going to massage therapy to help with tightness. Describes that he is currently seeing a neurologist regarding a minor tremor that he has, which the neurologist is currently titrating down on one of his medication to help with reported tremor.      Miscellaneous     Food interactions, DDIs, financial issues Determine if patient started any new medications since being placed on oral chemo (analyze for DDI)      Additional Notes: Patient aware to call with questions or concerns. Reports compliance to medications. Plans to travel during the holidays.

## 2018-01-01 ENCOUNTER — APPOINTMENT (OUTPATIENT)
Dept: ONCOLOGY | Facility: HOSPITAL | Age: 73
End: 2018-01-01

## 2018-01-01 ENCOUNTER — APPOINTMENT (OUTPATIENT)
Dept: GENERAL RADIOLOGY | Facility: HOSPITAL | Age: 73
End: 2018-01-01

## 2018-01-01 ENCOUNTER — APPOINTMENT (OUTPATIENT)
Dept: MRI IMAGING | Facility: HOSPITAL | Age: 73
End: 2018-01-01

## 2018-01-01 ENCOUNTER — INFUSION (OUTPATIENT)
Dept: ONCOLOGY | Facility: HOSPITAL | Age: 73
End: 2018-01-01

## 2018-01-01 ENCOUNTER — OFFICE VISIT (OUTPATIENT)
Dept: ONCOLOGY | Facility: CLINIC | Age: 73
End: 2018-01-01

## 2018-01-01 ENCOUNTER — LAB (OUTPATIENT)
Dept: LAB | Facility: HOSPITAL | Age: 73
End: 2018-01-01

## 2018-01-01 ENCOUNTER — TELEPHONE (OUTPATIENT)
Dept: NEUROLOGY | Facility: CLINIC | Age: 73
End: 2018-01-01

## 2018-01-01 ENCOUNTER — HOSPITAL ENCOUNTER (INPATIENT)
Facility: HOSPITAL | Age: 73
End: 2018-01-01
Attending: INTERNAL MEDICINE | Admitting: INTERNAL MEDICINE

## 2018-01-01 ENCOUNTER — SPECIALTY PHARMACY (OUTPATIENT)
Dept: ONCOLOGY | Facility: HOSPITAL | Age: 73
End: 2018-01-01

## 2018-01-01 ENCOUNTER — TELEPHONE (OUTPATIENT)
Dept: ONCOLOGY | Facility: CLINIC | Age: 73
End: 2018-01-01

## 2018-01-01 ENCOUNTER — HOSPITAL ENCOUNTER (OUTPATIENT)
Dept: MRI IMAGING | Facility: HOSPITAL | Age: 73
Discharge: HOME | End: 2018-08-07
Attending: INTERNAL MEDICINE | Admitting: RADIOLOGY

## 2018-01-01 ENCOUNTER — APPOINTMENT (OUTPATIENT)
Dept: NEPHROLOGY | Facility: HOSPITAL | Age: 73
End: 2018-01-01
Attending: SURGERY

## 2018-01-01 ENCOUNTER — EDUCATION (OUTPATIENT)
Dept: ONCOLOGY | Facility: HOSPITAL | Age: 73
End: 2018-01-01

## 2018-01-01 ENCOUNTER — APPOINTMENT (OUTPATIENT)
Dept: NEPHROLOGY | Facility: HOSPITAL | Age: 73
End: 2018-01-01
Attending: INTERNAL MEDICINE

## 2018-01-01 ENCOUNTER — APPOINTMENT (OUTPATIENT)
Dept: ULTRASOUND IMAGING | Facility: HOSPITAL | Age: 73
End: 2018-01-01

## 2018-01-01 ENCOUNTER — HOSPITAL ENCOUNTER (OUTPATIENT)
Dept: GENERAL RADIOLOGY | Facility: HOSPITAL | Age: 73
Discharge: HOME | End: 2018-07-11
Attending: FAMILY MEDICINE | Admitting: FAMILY MEDICINE

## 2018-01-01 ENCOUNTER — APPOINTMENT (OUTPATIENT)
Dept: CARDIOLOGY | Facility: HOSPITAL | Age: 73
End: 2018-01-01
Attending: INTERNAL MEDICINE

## 2018-01-01 ENCOUNTER — TRANSCRIBE ORDERS (OUTPATIENT)
Dept: ADMINISTRATIVE | Facility: HOSPITAL | Age: 73
End: 2018-01-01

## 2018-01-01 ENCOUNTER — APPOINTMENT (OUTPATIENT)
Dept: MRI IMAGING | Facility: HOSPITAL | Age: 73
End: 2018-01-01
Attending: INTERNAL MEDICINE

## 2018-01-01 ENCOUNTER — HOSPITAL ENCOUNTER (OUTPATIENT)
Dept: CARDIOLOGY | Facility: HOSPITAL | Age: 73
Discharge: HOME | End: 2018-04-04
Admitting: NURSE PRACTITIONER

## 2018-01-01 ENCOUNTER — OFFICE VISIT (OUTPATIENT)
Dept: NEUROLOGY | Facility: CLINIC | Age: 73
End: 2018-01-01

## 2018-01-01 ENCOUNTER — DOCUMENTATION (OUTPATIENT)
Dept: NUTRITION | Facility: HOSPITAL | Age: 73
End: 2018-01-01

## 2018-01-01 ENCOUNTER — ANESTHESIA EVENT (OUTPATIENT)
Dept: PERIOP | Facility: HOSPITAL | Age: 73
End: 2018-01-01

## 2018-01-01 ENCOUNTER — HOSPITAL ENCOUNTER (OUTPATIENT)
Dept: MRI IMAGING | Facility: HOSPITAL | Age: 73
Discharge: HOME | End: 2018-08-03
Attending: INTERNAL MEDICINE

## 2018-01-01 ENCOUNTER — HOSPITAL ENCOUNTER (INPATIENT)
Facility: HOSPITAL | Age: 73
LOS: 11 days | End: 2018-08-20
Attending: HOSPITALIST | Admitting: INTERNAL MEDICINE

## 2018-01-01 ENCOUNTER — HOSPITAL ENCOUNTER (OUTPATIENT)
Dept: CT IMAGING | Facility: HOSPITAL | Age: 73
Discharge: HOME | End: 2018-07-24
Admitting: NURSE PRACTITIONER

## 2018-01-01 ENCOUNTER — APPOINTMENT (OUTPATIENT)
Dept: CT IMAGING | Facility: HOSPITAL | Age: 73
End: 2018-01-01

## 2018-01-01 ENCOUNTER — ANESTHESIA (OUTPATIENT)
Dept: PERIOP | Facility: HOSPITAL | Age: 73
End: 2018-01-01

## 2018-01-01 VITALS
TEMPERATURE: 96.8 F | RESPIRATION RATE: 18 BRPM | BODY MASS INDEX: 43.19 KG/M2 | SYSTOLIC BLOOD PRESSURE: 119 MMHG | HEIGHT: 70 IN | SYSTOLIC BLOOD PRESSURE: 141 MMHG | BODY MASS INDEX: 43.44 KG/M2 | TEMPERATURE: 97.7 F | RESPIRATION RATE: 16 BRPM | WEIGHT: 303.4 LBS | DIASTOLIC BLOOD PRESSURE: 61 MMHG | SYSTOLIC BLOOD PRESSURE: 135 MMHG | DIASTOLIC BLOOD PRESSURE: 62 MMHG | TEMPERATURE: 97.1 F | WEIGHT: 297 LBS | WEIGHT: 301 LBS | DIASTOLIC BLOOD PRESSURE: 77 MMHG | RESPIRATION RATE: 18 BRPM | HEIGHT: 70 IN | HEART RATE: 96 BPM | BODY MASS INDEX: 42.52 KG/M2 | HEART RATE: 91 BPM | HEART RATE: 92 BPM | OXYGEN SATURATION: 95 %

## 2018-01-01 VITALS
SYSTOLIC BLOOD PRESSURE: 147 MMHG | RESPIRATION RATE: 20 BRPM | WEIGHT: 289 LBS | HEART RATE: 100 BPM | TEMPERATURE: 97.5 F | BODY MASS INDEX: 41.37 KG/M2 | HEIGHT: 70 IN | OXYGEN SATURATION: 90 % | DIASTOLIC BLOOD PRESSURE: 70 MMHG

## 2018-01-01 VITALS
BODY MASS INDEX: 43.05 KG/M2 | TEMPERATURE: 97.4 F | RESPIRATION RATE: 18 BRPM | WEIGHT: 300 LBS | DIASTOLIC BLOOD PRESSURE: 82 MMHG | SYSTOLIC BLOOD PRESSURE: 151 MMHG | OXYGEN SATURATION: 96 % | HEART RATE: 85 BPM

## 2018-01-01 VITALS
SYSTOLIC BLOOD PRESSURE: 120 MMHG | HEIGHT: 70 IN | DIASTOLIC BLOOD PRESSURE: 70 MMHG | BODY MASS INDEX: 43.09 KG/M2 | WEIGHT: 301 LBS

## 2018-01-01 VITALS
HEIGHT: 70 IN | SYSTOLIC BLOOD PRESSURE: 118 MMHG | DIASTOLIC BLOOD PRESSURE: 58 MMHG | HEART RATE: 87 BPM | WEIGHT: 297 LBS | BODY MASS INDEX: 42.52 KG/M2 | TEMPERATURE: 97.6 F | RESPIRATION RATE: 16 BRPM

## 2018-01-01 VITALS — BODY MASS INDEX: 42.95 KG/M2 | HEIGHT: 70 IN | WEIGHT: 300 LBS

## 2018-01-01 VITALS
WEIGHT: 298 LBS | BODY MASS INDEX: 42.66 KG/M2 | DIASTOLIC BLOOD PRESSURE: 69 MMHG | RESPIRATION RATE: 20 BRPM | SYSTOLIC BLOOD PRESSURE: 139 MMHG | HEIGHT: 70 IN | TEMPERATURE: 97.9 F | HEART RATE: 88 BPM

## 2018-01-01 VITALS
TEMPERATURE: 97.9 F | DIASTOLIC BLOOD PRESSURE: 67 MMHG | BODY MASS INDEX: 43.09 KG/M2 | WEIGHT: 301 LBS | HEIGHT: 70 IN | RESPIRATION RATE: 22 BRPM | OXYGEN SATURATION: 96 % | SYSTOLIC BLOOD PRESSURE: 142 MMHG | HEART RATE: 98 BPM

## 2018-01-01 VITALS
BODY MASS INDEX: 40.56 KG/M2 | SYSTOLIC BLOOD PRESSURE: 131 MMHG | HEART RATE: 82 BPM | DIASTOLIC BLOOD PRESSURE: 78 MMHG | HEIGHT: 70 IN | WEIGHT: 283.3 LBS | OXYGEN SATURATION: 98 % | RESPIRATION RATE: 23 BRPM | TEMPERATURE: 97.8 F

## 2018-01-01 VITALS
SYSTOLIC BLOOD PRESSURE: 139 MMHG | WEIGHT: 297 LBS | RESPIRATION RATE: 20 BRPM | DIASTOLIC BLOOD PRESSURE: 74 MMHG | TEMPERATURE: 97.6 F | HEART RATE: 93 BPM | BODY MASS INDEX: 42.62 KG/M2

## 2018-01-01 VITALS
BODY MASS INDEX: 42.37 KG/M2 | RESPIRATION RATE: 20 BRPM | SYSTOLIC BLOOD PRESSURE: 117 MMHG | DIASTOLIC BLOOD PRESSURE: 57 MMHG | TEMPERATURE: 97.4 F | WEIGHT: 296 LBS | HEIGHT: 70 IN | HEART RATE: 85 BPM

## 2018-01-01 VITALS
TEMPERATURE: 97.8 F | BODY MASS INDEX: 43.52 KG/M2 | HEIGHT: 70 IN | RESPIRATION RATE: 20 BRPM | SYSTOLIC BLOOD PRESSURE: 140 MMHG | WEIGHT: 304 LBS | DIASTOLIC BLOOD PRESSURE: 74 MMHG | HEART RATE: 87 BPM

## 2018-01-01 VITALS
HEART RATE: 90 BPM | WEIGHT: 301.4 LBS | TEMPERATURE: 96.3 F | BODY MASS INDEX: 43.25 KG/M2 | RESPIRATION RATE: 18 BRPM | SYSTOLIC BLOOD PRESSURE: 145 MMHG | DIASTOLIC BLOOD PRESSURE: 54 MMHG

## 2018-01-01 VITALS
HEART RATE: 84 BPM | SYSTOLIC BLOOD PRESSURE: 119 MMHG | DIASTOLIC BLOOD PRESSURE: 56 MMHG | WEIGHT: 301 LBS | BODY MASS INDEX: 43.19 KG/M2 | RESPIRATION RATE: 16 BRPM | TEMPERATURE: 96.1 F

## 2018-01-01 VITALS
BODY MASS INDEX: 42.47 KG/M2 | TEMPERATURE: 96.5 F | SYSTOLIC BLOOD PRESSURE: 137 MMHG | WEIGHT: 296 LBS | OXYGEN SATURATION: 95 % | DIASTOLIC BLOOD PRESSURE: 87 MMHG | HEART RATE: 85 BPM | RESPIRATION RATE: 18 BRPM

## 2018-01-01 VITALS
SYSTOLIC BLOOD PRESSURE: 103 MMHG | TEMPERATURE: 97.6 F | OXYGEN SATURATION: 94 % | BODY MASS INDEX: 37.37 KG/M2 | WEIGHT: 261.02 LBS | DIASTOLIC BLOOD PRESSURE: 54 MMHG | HEIGHT: 70 IN

## 2018-01-01 VITALS
HEART RATE: 82 BPM | SYSTOLIC BLOOD PRESSURE: 116 MMHG | TEMPERATURE: 96.4 F | RESPIRATION RATE: 18 BRPM | DIASTOLIC BLOOD PRESSURE: 48 MMHG

## 2018-01-01 VITALS
RESPIRATION RATE: 18 BRPM | TEMPERATURE: 97.8 F | WEIGHT: 299 LBS | DIASTOLIC BLOOD PRESSURE: 74 MMHG | SYSTOLIC BLOOD PRESSURE: 138 MMHG | OXYGEN SATURATION: 94 % | BODY MASS INDEX: 42.8 KG/M2 | HEIGHT: 70 IN | HEART RATE: 97 BPM

## 2018-01-01 VITALS
WEIGHT: 300 LBS | RESPIRATION RATE: 20 BRPM | HEART RATE: 91 BPM | TEMPERATURE: 97.6 F | HEIGHT: 70 IN | BODY MASS INDEX: 42.95 KG/M2 | SYSTOLIC BLOOD PRESSURE: 134 MMHG | DIASTOLIC BLOOD PRESSURE: 70 MMHG

## 2018-01-01 VITALS
HEART RATE: 104 BPM | RESPIRATION RATE: 20 BRPM | TEMPERATURE: 97.4 F | BODY MASS INDEX: 42.95 KG/M2 | SYSTOLIC BLOOD PRESSURE: 162 MMHG | DIASTOLIC BLOOD PRESSURE: 81 MMHG | WEIGHT: 300 LBS | HEIGHT: 70 IN

## 2018-01-01 VITALS
WEIGHT: 295 LBS | SYSTOLIC BLOOD PRESSURE: 104 MMHG | DIASTOLIC BLOOD PRESSURE: 65 MMHG | HEART RATE: 79 BPM | TEMPERATURE: 96.1 F | BODY MASS INDEX: 42.33 KG/M2 | RESPIRATION RATE: 20 BRPM

## 2018-01-01 VITALS
RESPIRATION RATE: 20 BRPM | BODY MASS INDEX: 43.95 KG/M2 | OXYGEN SATURATION: 94 % | WEIGHT: 307 LBS | DIASTOLIC BLOOD PRESSURE: 68 MMHG | HEART RATE: 97 BPM | SYSTOLIC BLOOD PRESSURE: 135 MMHG | HEIGHT: 70 IN | TEMPERATURE: 97.7 F

## 2018-01-01 VITALS
DIASTOLIC BLOOD PRESSURE: 52 MMHG | SYSTOLIC BLOOD PRESSURE: 119 MMHG | RESPIRATION RATE: 18 BRPM | WEIGHT: 299.8 LBS | HEART RATE: 89 BPM | TEMPERATURE: 98.2 F | BODY MASS INDEX: 43.02 KG/M2

## 2018-01-01 VITALS
BODY MASS INDEX: 42.7 KG/M2 | SYSTOLIC BLOOD PRESSURE: 131 MMHG | RESPIRATION RATE: 18 BRPM | DIASTOLIC BLOOD PRESSURE: 59 MMHG | TEMPERATURE: 97.5 F | HEART RATE: 91 BPM | WEIGHT: 297.6 LBS

## 2018-01-01 VITALS
SYSTOLIC BLOOD PRESSURE: 116 MMHG | TEMPERATURE: 97.3 F | WEIGHT: 301.6 LBS | DIASTOLIC BLOOD PRESSURE: 62 MMHG | RESPIRATION RATE: 18 BRPM | HEART RATE: 87 BPM | BODY MASS INDEX: 43.28 KG/M2

## 2018-01-01 VITALS
TEMPERATURE: 96.8 F | RESPIRATION RATE: 20 BRPM | DIASTOLIC BLOOD PRESSURE: 69 MMHG | HEART RATE: 100 BPM | BODY MASS INDEX: 42.56 KG/M2 | WEIGHT: 296.6 LBS | SYSTOLIC BLOOD PRESSURE: 139 MMHG

## 2018-01-01 VITALS
WEIGHT: 275 LBS | BODY MASS INDEX: 39.37 KG/M2 | DIASTOLIC BLOOD PRESSURE: 68 MMHG | HEIGHT: 70 IN | SYSTOLIC BLOOD PRESSURE: 132 MMHG

## 2018-01-01 VITALS
HEIGHT: 70 IN | TEMPERATURE: 96.9 F | DIASTOLIC BLOOD PRESSURE: 59 MMHG | WEIGHT: 293 LBS | BODY MASS INDEX: 41.95 KG/M2 | HEART RATE: 81 BPM | RESPIRATION RATE: 20 BRPM | SYSTOLIC BLOOD PRESSURE: 130 MMHG

## 2018-01-01 VITALS
RESPIRATION RATE: 20 BRPM | WEIGHT: 299 LBS | BODY MASS INDEX: 42.9 KG/M2 | SYSTOLIC BLOOD PRESSURE: 151 MMHG | TEMPERATURE: 97.6 F | DIASTOLIC BLOOD PRESSURE: 69 MMHG | HEART RATE: 100 BPM

## 2018-01-01 VITALS
TEMPERATURE: 97.6 F | BODY MASS INDEX: 39.46 KG/M2 | HEIGHT: 70 IN | OXYGEN SATURATION: 92 % | SYSTOLIC BLOOD PRESSURE: 141 MMHG | RESPIRATION RATE: 22 BRPM | WEIGHT: 275.6 LBS | HEART RATE: 90 BPM | DIASTOLIC BLOOD PRESSURE: 71 MMHG

## 2018-01-01 VITALS
WEIGHT: 301.4 LBS | SYSTOLIC BLOOD PRESSURE: 131 MMHG | RESPIRATION RATE: 18 BRPM | TEMPERATURE: 96.7 F | DIASTOLIC BLOOD PRESSURE: 66 MMHG | BODY MASS INDEX: 43.25 KG/M2 | HEART RATE: 96 BPM

## 2018-01-01 VITALS
SYSTOLIC BLOOD PRESSURE: 110 MMHG | RESPIRATION RATE: 18 BRPM | HEART RATE: 82 BPM | TEMPERATURE: 97.3 F | DIASTOLIC BLOOD PRESSURE: 59 MMHG | BODY MASS INDEX: 43.19 KG/M2 | WEIGHT: 301 LBS

## 2018-01-01 DIAGNOSIS — C90.02 MULTIPLE MYELOMA IN RELAPSE (HCC): ICD-10-CM

## 2018-01-01 DIAGNOSIS — C90.00 MULTIPLE MYELOMA, REMISSION STATUS UNSPECIFIED (HCC): Primary | ICD-10-CM

## 2018-01-01 DIAGNOSIS — C90.00 MULTIPLE MYELOMA NOT HAVING ACHIEVED REMISSION (HCC): Primary | ICD-10-CM

## 2018-01-01 DIAGNOSIS — R51.9 LEFT FACIAL PAIN: Primary | ICD-10-CM

## 2018-01-01 DIAGNOSIS — Z79.01 ENCOUNTER FOR MONITORING COUMADIN THERAPY: ICD-10-CM

## 2018-01-01 DIAGNOSIS — C90.00 MULTIPLE MYELOMA, REMISSION STATUS UNSPECIFIED (HCC): ICD-10-CM

## 2018-01-01 DIAGNOSIS — C90.02 MULTIPLE MYELOMA IN RELAPSE (HCC): Primary | ICD-10-CM

## 2018-01-01 DIAGNOSIS — Z51.81 ENCOUNTER FOR MONITORING COUMADIN THERAPY: ICD-10-CM

## 2018-01-01 DIAGNOSIS — R13.11 ORAL PHASE DYSPHAGIA: ICD-10-CM

## 2018-01-01 DIAGNOSIS — Z51.81 ENCOUNTER FOR THERAPEUTIC DRUG MONITORING: ICD-10-CM

## 2018-01-01 DIAGNOSIS — R06.02 SHORTNESS OF BREATH: ICD-10-CM

## 2018-01-01 DIAGNOSIS — N17.9 ACUTE RENAL FAILURE, UNSPECIFIED ACUTE RENAL FAILURE TYPE (HCC): Primary | ICD-10-CM

## 2018-01-01 DIAGNOSIS — R06.02 SHORTNESS OF BREATH: Primary | ICD-10-CM

## 2018-01-01 DIAGNOSIS — G40.109 LOCALIZATION-RELATED SYMPTOMATIC EPILEPSY AND EPILEPTIC SYNDROMES WITH SIMPLE PARTIAL SEIZURES, NOT INTRACTABLE, WITHOUT STATUS EPILEPTICUS (HCC): Primary | ICD-10-CM

## 2018-01-01 DIAGNOSIS — G89.3 CANCER RELATED PAIN: ICD-10-CM

## 2018-01-01 DIAGNOSIS — C90.00 MULTIPLE MYELOMA NOT HAVING ACHIEVED REMISSION (HCC): ICD-10-CM

## 2018-01-01 DIAGNOSIS — Z86.711 PERSONAL HISTORY OF PE (PULMONARY EMBOLISM): Primary | ICD-10-CM

## 2018-01-01 DIAGNOSIS — Z51.11 ENCOUNTER FOR ANTINEOPLASTIC CHEMOTHERAPY: ICD-10-CM

## 2018-01-01 DIAGNOSIS — G25.0 ESSENTIAL TREMOR: ICD-10-CM

## 2018-01-01 DIAGNOSIS — R05.9 COUGH: Primary | ICD-10-CM

## 2018-01-01 DIAGNOSIS — Z74.09 IMPAIRED FUNCTIONAL MOBILITY, BALANCE, GAIT, AND ENDURANCE: ICD-10-CM

## 2018-01-01 LAB
ABO + RH BLD: NORMAL
ABO GROUP BLD: NORMAL
ABO GROUP BLD: NORMAL
ALBUMIN SERPL-MCNC: 3.6 G/DL (ref 3.2–4.8)
ALBUMIN SERPL-MCNC: 3.72 G/DL (ref 3.2–4.8)
ALBUMIN SERPL-MCNC: 3.84 G/DL (ref 3.2–4.8)
ALBUMIN SERPL-MCNC: 3.86 G/DL (ref 3.2–4.8)
ALBUMIN SERPL-MCNC: 3.9 G/DL (ref 3.2–4.8)
ALBUMIN SERPL-MCNC: 3.9 G/DL (ref 3.5–4.8)
ALBUMIN SERPL-MCNC: 4 G/DL (ref 3.2–4.8)
ALBUMIN SERPL-MCNC: 4 G/DL (ref 3.5–4.8)
ALBUMIN SERPL-MCNC: 4.04 G/DL (ref 3.2–4.8)
ALBUMIN SERPL-MCNC: 4.1 G/DL (ref 3.2–4.8)
ALBUMIN SERPL-MCNC: 4.2 G/DL (ref 3.2–4.8)
ALBUMIN SERPL-MCNC: 4.2 G/DL (ref 3.5–4.8)
ALBUMIN SERPL-MCNC: 4.2 G/DL (ref 3.5–4.8)
ALBUMIN SERPL-MCNC: 4.23 G/DL (ref 3.2–4.8)
ALBUMIN SERPL-MCNC: 4.3 G/DL (ref 3.2–4.8)
ALBUMIN SERPL-MCNC: 4.3 G/DL (ref 3.2–4.8)
ALBUMIN SERPL-MCNC: 4.43 G/DL (ref 3.2–4.8)
ALBUMIN SERPL-MCNC: 4.51 G/DL (ref 3.2–4.8)
ALBUMIN SERPL-MCNC: 4.58 G/DL (ref 3.2–4.8)
ALBUMIN SERPL-MCNC: 4.74 G/DL (ref 3.2–4.8)
ALBUMIN/GLOB SERPL: 1.8 G/DL (ref 1.5–2.5)
ALBUMIN/GLOB SERPL: 1.9 G/DL (ref 1.5–2.5)
ALBUMIN/GLOB SERPL: 1.9 G/DL (ref 1.5–2.5)
ALBUMIN/GLOB SERPL: 2 G/DL (ref 1.5–2.5)
ALBUMIN/GLOB SERPL: 2.1 G/DL (ref 1.5–2.5)
ALBUMIN/GLOB SERPL: 2.1 G/DL (ref 1.5–2.5)
ALBUMIN/GLOB SERPL: 2.1 {RATIO} (ref 1.2–2.2)
ALBUMIN/GLOB SERPL: 2.2 G/DL (ref 1.5–2.5)
ALBUMIN/GLOB SERPL: 2.2 G/DL (ref 1.5–2.5)
ALBUMIN/GLOB SERPL: 2.2 {RATIO} (ref 1.2–2.2)
ALBUMIN/GLOB SERPL: 2.3 G/DL (ref 1.5–2.5)
ALBUMIN/GLOB SERPL: 2.3 G/DL (ref 1.5–2.5)
ALBUMIN/GLOB SERPL: 2.4 G/DL (ref 1.5–2.5)
ALBUMIN/GLOB SERPL: 2.4 {RATIO} (ref 1.2–2.2)
ALBUMIN/GLOB SERPL: 2.5 G/DL (ref 1.5–2.5)
ALBUMIN/GLOB SERPL: 2.6 {RATIO} (ref 1.2–2.2)
ALBUMIN/GLOB SERPL: 2.8 G/DL (ref 1.5–2.5)
ALP SERPL-CCNC: 61 U/L (ref 25–100)
ALP SERPL-CCNC: 62 U/L (ref 25–100)
ALP SERPL-CCNC: 66 U/L (ref 25–100)
ALP SERPL-CCNC: 67 IU/L (ref 39–117)
ALP SERPL-CCNC: 68 U/L (ref 25–100)
ALP SERPL-CCNC: 69 U/L (ref 25–100)
ALP SERPL-CCNC: 70 IU/L (ref 39–117)
ALP SERPL-CCNC: 70 U/L (ref 25–100)
ALP SERPL-CCNC: 72 IU/L (ref 39–117)
ALP SERPL-CCNC: 72 U/L (ref 25–100)
ALP SERPL-CCNC: 73 U/L (ref 25–100)
ALP SERPL-CCNC: 74 U/L (ref 25–100)
ALP SERPL-CCNC: 76 U/L (ref 25–100)
ALP SERPL-CCNC: 77 U/L (ref 25–100)
ALP SERPL-CCNC: 78 U/L (ref 25–100)
ALP SERPL-CCNC: 80 IU/L (ref 39–117)
ALP SERPL-CCNC: 80 U/L (ref 25–100)
ALP SERPL-CCNC: 87 U/L (ref 25–100)
ALP SERPL-CCNC: 93 U/L (ref 25–100)
ALT SERPL W P-5'-P-CCNC: 18 U/L (ref 7–40)
ALT SERPL W P-5'-P-CCNC: 19 U/L (ref 7–40)
ALT SERPL W P-5'-P-CCNC: 20 U/L (ref 7–40)
ALT SERPL W P-5'-P-CCNC: 21 U/L (ref 7–40)
ALT SERPL W P-5'-P-CCNC: 25 U/L (ref 7–40)
ALT SERPL W P-5'-P-CCNC: 25 U/L (ref 7–40)
ALT SERPL W P-5'-P-CCNC: 26 U/L (ref 7–40)
ALT SERPL W P-5'-P-CCNC: 27 U/L (ref 7–40)
ALT SERPL W P-5'-P-CCNC: 27 U/L (ref 7–40)
ALT SERPL W P-5'-P-CCNC: 28 U/L (ref 7–40)
ALT SERPL W P-5'-P-CCNC: 30 U/L (ref 7–40)
ALT SERPL W P-5'-P-CCNC: 31 U/L (ref 7–40)
ALT SERPL W P-5'-P-CCNC: 31 U/L (ref 7–40)
ALT SERPL W P-5'-P-CCNC: 33 U/L (ref 7–40)
ALT SERPL W P-5'-P-CCNC: 35 U/L (ref 7–40)
ALT SERPL-CCNC: 19 IU/L (ref 0–44)
ALT SERPL-CCNC: 20 IU/L (ref 0–44)
ALT SERPL-CCNC: 21 IU/L (ref 0–44)
ALT SERPL-CCNC: 21 IU/L (ref 0–44)
AMBIG ABBREV CMP14 DEFAULT: NORMAL
AMBIG ABBREV CMP14 DEFAULT: NORMAL
AMMONIA BLD-SCNC: 33 UMOL/L (ref 19–60)
AMMONIA BLD-SCNC: 50 UMOL/L (ref 19–60)
ANION GAP SERPL CALCULATED.3IONS-SCNC: 11 MMOL/L (ref 3–11)
ANION GAP SERPL CALCULATED.3IONS-SCNC: 11 MMOL/L (ref 3–11)
ANION GAP SERPL CALCULATED.3IONS-SCNC: 12 MMOL/L (ref 3–11)
ANION GAP SERPL CALCULATED.3IONS-SCNC: 12 MMOL/L (ref 3–11)
ANION GAP SERPL CALCULATED.3IONS-SCNC: 13 MMOL/L (ref 3–11)
ANION GAP SERPL CALCULATED.3IONS-SCNC: 15 MMOL/L (ref 3–11)
ANION GAP SERPL CALCULATED.3IONS-SCNC: 16 MMOL/L (ref 3–11)
ANION GAP SERPL CALCULATED.3IONS-SCNC: 17 MMOL/L (ref 3–11)
ANION GAP SERPL CALCULATED.3IONS-SCNC: 18 MMOL/L (ref 3–11)
ANION GAP SERPL CALCULATED.3IONS-SCNC: 20 MMOL/L (ref 3–11)
ANION GAP SERPL CALCULATED.3IONS-SCNC: 21 MMOL/L (ref 3–11)
ANION GAP SERPL CALCULATED.3IONS-SCNC: 24 MMOL/L (ref 3–11)
ANION GAP SERPL CALCULATED.3IONS-SCNC: 24 MMOL/L (ref 3–11)
ANION GAP SERPL CALCULATED.3IONS-SCNC: 28 MMOL/L (ref 3–11)
ANION GAP SERPL CALCULATED.3IONS-SCNC: 5 MMOL/L (ref 3–11)
ANION GAP SERPL CALCULATED.3IONS-SCNC: 6 MMOL/L (ref 3–11)
ANION GAP SERPL CALCULATED.3IONS-SCNC: 8 MMOL/L (ref 3–11)
ANION GAP SERPL CALCULATED.3IONS-SCNC: 8 MMOL/L (ref 3–11)
ANION GAP SERPL CALCULATED.3IONS-SCNC: 9 MMOL/L (ref 3–11)
APTT PPP: 25.5 SECONDS (ref 24–31)
APTT PPP: 36.6 SECONDS (ref 24–31)
APTT PPP: 64.1 SECONDS (ref 24–31)
ARTERIAL PATENCY WRIST A: ABNORMAL
AST SERPL-CCNC: 16 IU/L (ref 0–40)
AST SERPL-CCNC: 16 IU/L (ref 0–40)
AST SERPL-CCNC: 17 U/L (ref 0–33)
AST SERPL-CCNC: 17 U/L (ref 0–33)
AST SERPL-CCNC: 19 IU/L (ref 0–40)
AST SERPL-CCNC: 20 U/L (ref 0–33)
AST SERPL-CCNC: 21 U/L (ref 0–33)
AST SERPL-CCNC: 22 U/L (ref 0–33)
AST SERPL-CCNC: 22 U/L (ref 0–33)
AST SERPL-CCNC: 23 IU/L (ref 0–40)
AST SERPL-CCNC: 24 U/L (ref 0–33)
AST SERPL-CCNC: 24 U/L (ref 0–33)
AST SERPL-CCNC: 30 U/L (ref 0–33)
AST SERPL-CCNC: 33 U/L (ref 0–33)
AST SERPL-CCNC: 41 U/L (ref 0–33)
AST SERPL-CCNC: 42 U/L (ref 0–33)
AST SERPL-CCNC: 42 U/L (ref 0–33)
AST SERPL-CCNC: 44 U/L (ref 0–33)
AST SERPL-CCNC: 51 U/L (ref 0–33)
AST SERPL-CCNC: 59 U/L (ref 0–33)
AST SERPL-CCNC: 70 U/L (ref 0–33)
ATMOSPHERIC PRESS: ABNORMAL MMHG
BASE EXCESS BLDA CALC-SCNC: -4.1 MMOL/L (ref 0–2)
BASOPHILS # BLD AUTO: 0 X10E3/UL (ref 0–0.2)
BASOPHILS # BLD AUTO: 0.61 10*3/MM3 (ref 0–0.2)
BASOPHILS # BLD MANUAL: 0 10*3/MM3 (ref 0–0.2)
BASOPHILS # BLD MANUAL: 0.12 10*3/MM3 (ref 0–0.2)
BASOPHILS NFR BLD AUTO: 0 %
BASOPHILS NFR BLD AUTO: 0 %
BASOPHILS NFR BLD AUTO: 0 % (ref 0–1)
BASOPHILS NFR BLD AUTO: 1 %
BASOPHILS NFR BLD AUTO: 1 %
BASOPHILS NFR BLD AUTO: 1 % (ref 0–1)
BASOPHILS NFR BLD AUTO: 1.9 % (ref 0–1)
BDY SITE: ABNORMAL
BH BB BLOOD EXPIRATION DATE: NORMAL
BH BB BLOOD TYPE BARCODE: 6200
BH BB BLOOD TYPE BARCODE: 8400
BH BB BLOOD TYPE BARCODE: 8400
BH BB DISPENSE STATUS: NORMAL
BH BB PRODUCT CODE: NORMAL
BH BB UNIT NUMBER: NORMAL
BH CV ECHO MEAS - AO MAX PG (FULL): 9 MMHG
BH CV ECHO MEAS - AO MAX PG: 12 MMHG
BH CV ECHO MEAS - AO MEAN PG (FULL): 4.5 MMHG
BH CV ECHO MEAS - AO MEAN PG: 6.5 MMHG
BH CV ECHO MEAS - AO ROOT AREA (BSA CORRECTED): 1.2
BH CV ECHO MEAS - AO ROOT AREA (BSA CORRECTED): 1.2
BH CV ECHO MEAS - AO ROOT AREA: 6.1 CM^2
BH CV ECHO MEAS - AO ROOT AREA: 6.7 CM^2
BH CV ECHO MEAS - AO ROOT DIAM: 2.8 CM
BH CV ECHO MEAS - AO ROOT DIAM: 2.9 CM
BH CV ECHO MEAS - AO V2 MAX: 174.8 CM/SEC
BH CV ECHO MEAS - AO V2 MEAN: 117.6 CM/SEC
BH CV ECHO MEAS - AO V2 VTI: 38.3 CM
BH CV ECHO MEAS - ASC AORTA: 3.5 CM
BH CV ECHO MEAS - AVA(I,A): 1.9 CM^2
BH CV ECHO MEAS - AVA(I,D): 1.9 CM^2
BH CV ECHO MEAS - AVA(V,A): 1.6 CM^2
BH CV ECHO MEAS - AVA(V,D): 1.6 CM^2
BH CV ECHO MEAS - BSA(HAYCOCK): 2.5 M^2
BH CV ECHO MEAS - BSA(HAYCOCK): 2.7 M^2
BH CV ECHO MEAS - BSA: 2.4 M^2
BH CV ECHO MEAS - BSA: 2.5 M^2
BH CV ECHO MEAS - BZI_BMI: 38.2 KILOGRAMS/M^2
BH CV ECHO MEAS - BZI_BMI: 43 KILOGRAMS/M^2
BH CV ECHO MEAS - BZI_METRIC_HEIGHT: 177.8 CM
BH CV ECHO MEAS - BZI_METRIC_HEIGHT: 177.8 CM
BH CV ECHO MEAS - BZI_METRIC_WEIGHT: 120.7 KG
BH CV ECHO MEAS - BZI_METRIC_WEIGHT: 136.1 KG
BH CV ECHO MEAS - CONTRAST EF (2CH): 58.2 ML/M^2
BH CV ECHO MEAS - CONTRAST EF 4CH: 63.6 ML/M^2
BH CV ECHO MEAS - EDV(CUBED): 136.1 ML
BH CV ECHO MEAS - EDV(CUBED): 97.2 ML
BH CV ECHO MEAS - EDV(MOD-SP2): 79 ML
BH CV ECHO MEAS - EDV(MOD-SP4): 66 ML
BH CV ECHO MEAS - EDV(TEICH): 126.3 ML
BH CV ECHO MEAS - EDV(TEICH): 97.2 ML
BH CV ECHO MEAS - EF(CUBED): 71.8 %
BH CV ECHO MEAS - EF(CUBED): 75.5 %
BH CV ECHO MEAS - EF(MOD-SP2): 58.2 %
BH CV ECHO MEAS - EF(MOD-SP4): 63 %
BH CV ECHO MEAS - EF(TEICH): 63.6 %
BH CV ECHO MEAS - EF(TEICH): 67.1 %
BH CV ECHO MEAS - ESV(CUBED): 27.4 ML
BH CV ECHO MEAS - ESV(CUBED): 33.4 ML
BH CV ECHO MEAS - ESV(MOD-SP2): 33 ML
BH CV ECHO MEAS - ESV(MOD-SP4): 24 ML
BH CV ECHO MEAS - ESV(TEICH): 35.4 ML
BH CV ECHO MEAS - ESV(TEICH): 41.6 ML
BH CV ECHO MEAS - FS: 34.5 %
BH CV ECHO MEAS - FS: 37.4 %
BH CV ECHO MEAS - IVS/LVPW: 0.9
BH CV ECHO MEAS - IVS/LVPW: 1.2
BH CV ECHO MEAS - IVSD: 0.86 CM
BH CV ECHO MEAS - IVSD: 1.5 CM
BH CV ECHO MEAS - LA DIMENSION: 3.8 CM
BH CV ECHO MEAS - LA DIMENSION: 4.3 CM
BH CV ECHO MEAS - LA/AO: 1.3
BH CV ECHO MEAS - LA/AO: 1.5
BH CV ECHO MEAS - LV DIASTOLIC VOL/BSA (35-75): 26.6 ML/M^2
BH CV ECHO MEAS - LV MASS(C)D: 166.4 GRAMS
BH CV ECHO MEAS - LV MASS(C)D: 250.8 GRAMS
BH CV ECHO MEAS - LV MASS(C)DI: 101.1 GRAMS/M^2
BH CV ECHO MEAS - LV MASS(C)DI: 70.6 GRAMS/M^2
BH CV ECHO MEAS - LV MAX PG: 3 MMHG
BH CV ECHO MEAS - LV MEAN PG: 2 MMHG
BH CV ECHO MEAS - LV SYSTOLIC VOL/BSA (12-30): 9.7 ML/M^2
BH CV ECHO MEAS - LV V1 MAX: 86.4 CM/SEC
BH CV ECHO MEAS - LV V1 MEAN: 67.8 CM/SEC
BH CV ECHO MEAS - LV V1 VTI: 22.4 CM
BH CV ECHO MEAS - LVIDD: 4.6 CM
BH CV ECHO MEAS - LVIDD: 5.1 CM
BH CV ECHO MEAS - LVIDS: 3 CM
BH CV ECHO MEAS - LVIDS: 3.2 CM
BH CV ECHO MEAS - LVLD AP2: 7.1 CM
BH CV ECHO MEAS - LVLD AP4: 7.6 CM
BH CV ECHO MEAS - LVLS AP2: 6.2 CM
BH CV ECHO MEAS - LVLS AP4: 5.7 CM
BH CV ECHO MEAS - LVOT AREA (M): 3.1 CM^2
BH CV ECHO MEAS - LVOT AREA: 3.2 CM^2
BH CV ECHO MEAS - LVOT DIAM: 2 CM
BH CV ECHO MEAS - LVPWD: 0.95 CM
BH CV ECHO MEAS - LVPWD: 1.3 CM
BH CV ECHO MEAS - MR MAX PG: 79 MMHG
BH CV ECHO MEAS - MR MAX VEL: 444.4 CM/SEC
BH CV ECHO MEAS - MR MEAN PG: 57.7 MMHG
BH CV ECHO MEAS - MR MEAN VEL: 363.6 CM/SEC
BH CV ECHO MEAS - MR VTI: 144.7 CM
BH CV ECHO MEAS - MV A MAX VEL: 111.1 CM/SEC
BH CV ECHO MEAS - MV A MAX VEL: 112 CM/SEC
BH CV ECHO MEAS - MV DEC SLOPE: 546.6 CM/SEC^2
BH CV ECHO MEAS - MV DEC TIME: 0.11 SEC
BH CV ECHO MEAS - MV DEC TIME: 0.24 SEC
BH CV ECHO MEAS - MV E MAX VEL: 72.6 CM/SEC
BH CV ECHO MEAS - MV E MAX VEL: 91.8 CM/SEC
BH CV ECHO MEAS - MV E/A: 0.65
BH CV ECHO MEAS - MV E/A: 0.83
BH CV ECHO MEAS - PA ACC SLOPE: 616.2 CM/SEC^2
BH CV ECHO MEAS - PA ACC SLOPE: 626.9 CM/SEC^2
BH CV ECHO MEAS - PA ACC TIME: 0.12 SEC
BH CV ECHO MEAS - PA ACC TIME: 0.14 SEC
BH CV ECHO MEAS - PA MAX PG: 8.5 MMHG
BH CV ECHO MEAS - PA PR(ACCEL): 16.6 MMHG
BH CV ECHO MEAS - PA PR(ACCEL): 25.9 MMHG
BH CV ECHO MEAS - PA V2 MAX: 145.5 CM/SEC
BH CV ECHO MEAS - RAP SYSTOLE: 5 MMHG
BH CV ECHO MEAS - RAP SYSTOLE: 8 MMHG
BH CV ECHO MEAS - RVDD: 1.9 CM
BH CV ECHO MEAS - RVDD: 3.3 CM
BH CV ECHO MEAS - RVSP: 32 MMHG
BH CV ECHO MEAS - RVSP: 43 MMHG
BH CV ECHO MEAS - SI(AO): 99 ML/M^2
BH CV ECHO MEAS - SI(CUBED): 28.2 ML/M^2
BH CV ECHO MEAS - SI(CUBED): 43.6 ML/M^2
BH CV ECHO MEAS - SI(LVOT): 30.7 ML/M^2
BH CV ECHO MEAS - SI(MOD-SP2): 18.5 ML/M^2
BH CV ECHO MEAS - SI(MOD-SP4): 16.9 ML/M^2
BH CV ECHO MEAS - SI(TEICH): 24.9 ML/M^2
BH CV ECHO MEAS - SI(TEICH): 36 ML/M^2
BH CV ECHO MEAS - SV(AO): 233.2 ML
BH CV ECHO MEAS - SV(CUBED): 102.8 ML
BH CV ECHO MEAS - SV(CUBED): 69.8 ML
BH CV ECHO MEAS - SV(LVOT): 72.3 ML
BH CV ECHO MEAS - SV(MOD-SP2): 46 ML
BH CV ECHO MEAS - SV(MOD-SP4): 42 ML
BH CV ECHO MEAS - SV(TEICH): 61.8 ML
BH CV ECHO MEAS - SV(TEICH): 84.7 ML
BH CV ECHO MEAS - TAPSE (>1.6): 2.2 CM2
BH CV ECHO MEAS - TAPSE (>1.6): 2.7 CM2
BH CV ECHO MEAS - TR MAX VEL: 249.8 CM/SEC
BH CV ECHO MEAS - TR MAX VEL: 304.2 CM/SEC
BH CV ECHO MEAS - TV MAX PG: 1.7 MMHG
BH CV ECHO MEAS - TV V2 MAX: 64.7 CM/SEC
BH CV VAS BP RIGHT ARM: NORMAL MMHG
BH CV XLRA - RV BASE: 2.5 CM
BH CV XLRA - RV BASE: 3.6 CM
BH CV XLRA - RV LENGTH: 5.9 CM
BH CV XLRA - RV LENGTH: 6.6 CM
BH CV XLRA - RV MID: 2.1 CM
BH CV XLRA - RV MID: 2.9 CM
BH CV XLRA - TDI S': 16 CM/SEC
BILIRUB SERPL-MCNC: 0.3 MG/DL (ref 0.3–1.2)
BILIRUB SERPL-MCNC: 0.4 MG/DL (ref 0.3–1.2)
BILIRUB SERPL-MCNC: 0.4 MG/DL (ref 0–1.2)
BILIRUB SERPL-MCNC: 0.5 MG/DL (ref 0.3–1.2)
BILIRUB SERPL-MCNC: 0.5 MG/DL (ref 0–1.2)
BILIRUB SERPL-MCNC: 0.6 MG/DL (ref 0.3–1.2)
BILIRUB SERPL-MCNC: 0.6 MG/DL (ref 0–1.2)
BILIRUB SERPL-MCNC: 0.7 MG/DL (ref 0.3–1.2)
BILIRUB SERPL-MCNC: 0.7 MG/DL (ref 0–1.2)
BILIRUB SERPL-MCNC: 0.8 MG/DL (ref 0.3–1.2)
BILIRUB SERPL-MCNC: 0.9 MG/DL (ref 0.3–1.2)
BILIRUB SERPL-MCNC: 1 MG/DL (ref 0.3–1.2)
BILIRUB SERPL-MCNC: 1 MG/DL (ref 0.3–1.2)
BLD GP AB SCN SERPL QL: NEGATIVE
BLD GP AB SCN SERPL QL: NEGATIVE
BNP SERPL-MCNC: 50 PG/ML (ref 0–100)
BUN BLD-MCNC: 100 MG/DL (ref 9–23)
BUN BLD-MCNC: 111 MG/DL (ref 9–23)
BUN BLD-MCNC: 113 MG/DL (ref 9–23)
BUN BLD-MCNC: 114 MG/DL (ref 9–23)
BUN BLD-MCNC: 124 MG/DL (ref 9–23)
BUN BLD-MCNC: 16 MG/DL (ref 9–23)
BUN BLD-MCNC: 18 MG/DL (ref 9–23)
BUN BLD-MCNC: 20 MG/DL (ref 9–23)
BUN BLD-MCNC: 21 MG/DL (ref 9–23)
BUN BLD-MCNC: 22 MG/DL (ref 9–23)
BUN BLD-MCNC: 23 MG/DL (ref 9–23)
BUN BLD-MCNC: 23 MG/DL (ref 9–23)
BUN BLD-MCNC: 25 MG/DL (ref 9–23)
BUN BLD-MCNC: 42 MG/DL (ref 9–23)
BUN BLD-MCNC: 43 MG/DL (ref 9–23)
BUN BLD-MCNC: 60 MG/DL (ref 9–23)
BUN BLD-MCNC: 62 MG/DL (ref 9–23)
BUN BLD-MCNC: 62 MG/DL (ref 9–23)
BUN BLD-MCNC: 63 MG/DL (ref 9–23)
BUN BLD-MCNC: 69 MG/DL (ref 9–23)
BUN BLD-MCNC: 71 MG/DL (ref 9–23)
BUN BLD-MCNC: 72 MG/DL (ref 9–23)
BUN BLD-MCNC: 74 MG/DL (ref 9–23)
BUN BLD-MCNC: 76 MG/DL (ref 9–23)
BUN BLD-MCNC: 83 MG/DL (ref 9–23)
BUN BLD-MCNC: 84 MG/DL (ref 9–23)
BUN BLD-MCNC: 87 MG/DL (ref 9–23)
BUN BLD-MCNC: 95 MG/DL (ref 9–23)
BUN SERPL-MCNC: 14 MG/DL (ref 8–27)
BUN SERPL-MCNC: 17 MG/DL (ref 8–27)
BUN SERPL-MCNC: 17 MG/DL (ref 8–27)
BUN SERPL-MCNC: 18 MG/DL (ref 8–27)
BUN/CREAT SERPL: 10.9 (ref 7–25)
BUN/CREAT SERPL: 11.7 (ref 7–25)
BUN/CREAT SERPL: 11.7 (ref 7–25)
BUN/CREAT SERPL: 12.2 (ref 7–25)
BUN/CREAT SERPL: 12.3 (ref 7–25)
BUN/CREAT SERPL: 12.6 (ref 7–25)
BUN/CREAT SERPL: 13.8 (ref 7–25)
BUN/CREAT SERPL: 14 (ref 10–24)
BUN/CREAT SERPL: 15 (ref 10–24)
BUN/CREAT SERPL: 17.8 (ref 7–25)
BUN/CREAT SERPL: 18 (ref 7–25)
BUN/CREAT SERPL: 22.2 (ref 7–25)
BUN/CREAT SERPL: 22.4 (ref 7–25)
BUN/CREAT SERPL: 23.9 (ref 7–25)
BUN/CREAT SERPL: 25 (ref 7–25)
BUN/CREAT SERPL: 25 (ref 7–25)
BUN/CREAT SERPL: 25.6 (ref 7–25)
BUN/CREAT SERPL: 27.8 (ref 7–25)
BUN/CREAT SERPL: 7.1 (ref 7–25)
BUN/CREAT SERPL: 8.1 (ref 7–25)
BUN/CREAT SERPL: 8.2 (ref 7–25)
BUN/CREAT SERPL: 8.3 (ref 7–25)
BUN/CREAT SERPL: 8.4 (ref 7–25)
BUN/CREAT SERPL: 8.8 (ref 7–25)
BUN/CREAT SERPL: 8.9 (ref 7–25)
BUN/CREAT SERPL: 9.5 (ref 7–25)
BUN/CREAT SERPL: 9.7 (ref 7–25)
BUN/CREAT SERPL: 9.8 (ref 7–25)
BUN/CREAT SERPL: 9.8 (ref 7–25)
CA-I SERPL ISE-MCNC: 1.12 MMOL/L (ref 1.12–1.32)
CA-I SERPL ISE-MCNC: 1.14 MMOL/L (ref 1.12–1.32)
CA-I SERPL ISE-MCNC: 1.21 MMOL/L (ref 1.12–1.32)
CALCIUM SERPL-MCNC: 8.9 MG/DL (ref 8.6–10.2)
CALCIUM SERPL-MCNC: 9.1 MG/DL (ref 8.6–10.2)
CALCIUM SERPL-MCNC: 9.1 MG/DL (ref 8.6–10.2)
CALCIUM SERPL-MCNC: 9.4 MG/DL (ref 8.6–10.2)
CALCIUM SPEC-SCNC: 10 MG/DL (ref 8.7–10.4)
CALCIUM SPEC-SCNC: 10.2 MG/DL (ref 8.7–10.4)
CALCIUM SPEC-SCNC: 8.2 MG/DL (ref 8.7–10.4)
CALCIUM SPEC-SCNC: 8.3 MG/DL (ref 8.7–10.4)
CALCIUM SPEC-SCNC: 8.5 MG/DL (ref 8.7–10.4)
CALCIUM SPEC-SCNC: 8.5 MG/DL (ref 8.7–10.4)
CALCIUM SPEC-SCNC: 8.6 MG/DL (ref 8.7–10.4)
CALCIUM SPEC-SCNC: 8.7 MG/DL (ref 8.7–10.4)
CALCIUM SPEC-SCNC: 8.8 MG/DL (ref 8.7–10.4)
CALCIUM SPEC-SCNC: 9 MG/DL (ref 8.7–10.4)
CALCIUM SPEC-SCNC: 9.1 MG/DL (ref 8.7–10.4)
CALCIUM SPEC-SCNC: 9.2 MG/DL (ref 8.7–10.4)
CALCIUM SPEC-SCNC: 9.2 MG/DL (ref 8.7–10.4)
CALCIUM SPEC-SCNC: 9.3 MG/DL (ref 8.7–10.4)
CALCIUM SPEC-SCNC: 9.5 MG/DL (ref 8.7–10.4)
CALCIUM SPEC-SCNC: 9.6 MG/DL (ref 8.7–10.4)
CALCIUM SPEC-SCNC: 9.6 MG/DL (ref 8.7–10.4)
CALCIUM SPEC-SCNC: 9.7 MG/DL (ref 8.7–10.4)
CALCIUM SPEC-SCNC: 9.7 MG/DL (ref 8.7–10.4)
CHLORIDE SERPL-SCNC: 100 MMOL/L (ref 99–109)
CHLORIDE SERPL-SCNC: 100 MMOL/L (ref 99–109)
CHLORIDE SERPL-SCNC: 101 MMOL/L (ref 96–106)
CHLORIDE SERPL-SCNC: 101 MMOL/L (ref 99–109)
CHLORIDE SERPL-SCNC: 101 MMOL/L (ref 99–109)
CHLORIDE SERPL-SCNC: 102 MMOL/L (ref 99–109)
CHLORIDE SERPL-SCNC: 103 MMOL/L (ref 96–106)
CHLORIDE SERPL-SCNC: 103 MMOL/L (ref 96–106)
CHLORIDE SERPL-SCNC: 103 MMOL/L (ref 99–109)
CHLORIDE SERPL-SCNC: 104 MMOL/L (ref 96–106)
CHLORIDE SERPL-SCNC: 104 MMOL/L (ref 99–109)
CHLORIDE SERPL-SCNC: 104 MMOL/L (ref 99–109)
CHLORIDE SERPL-SCNC: 105 MMOL/L (ref 99–109)
CHLORIDE SERPL-SCNC: 106 MMOL/L (ref 99–109)
CHLORIDE SERPL-SCNC: 107 MMOL/L (ref 99–109)
CHLORIDE SERPL-SCNC: 108 MMOL/L (ref 99–109)
CHLORIDE SERPL-SCNC: 109 MMOL/L (ref 99–109)
CHLORIDE SERPL-SCNC: 110 MMOL/L (ref 99–109)
CHLORIDE SERPL-SCNC: 111 MMOL/L (ref 99–109)
CHLORIDE SERPL-SCNC: 98 MMOL/L (ref 99–109)
CHLORIDE SERPL-SCNC: 98 MMOL/L (ref 99–109)
CHLORIDE SERPL-SCNC: 99 MMOL/L (ref 99–109)
CK SERPL-CCNC: 370 U/L (ref 26–174)
CK SERPL-CCNC: 595 U/L (ref 26–174)
CO2 BLDA-SCNC: 21.5 MMOL/L (ref 22–33)
CO2 SERPL-SCNC: 16 MMOL/L (ref 20–31)
CO2 SERPL-SCNC: 16 MMOL/L (ref 20–31)
CO2 SERPL-SCNC: 17 MMOL/L (ref 20–31)
CO2 SERPL-SCNC: 17 MMOL/L (ref 20–31)
CO2 SERPL-SCNC: 18 MMOL/L (ref 20–31)
CO2 SERPL-SCNC: 19 MMOL/L (ref 20–31)
CO2 SERPL-SCNC: 20 MMOL/L (ref 20–29)
CO2 SERPL-SCNC: 20 MMOL/L (ref 20–31)
CO2 SERPL-SCNC: 21 MMOL/L (ref 20–31)
CO2 SERPL-SCNC: 22 MMOL/L (ref 18–29)
CO2 SERPL-SCNC: 22 MMOL/L (ref 20–31)
CO2 SERPL-SCNC: 23 MMOL/L (ref 20–31)
CO2 SERPL-SCNC: 24 MMOL/L (ref 18–29)
CO2 SERPL-SCNC: 24 MMOL/L (ref 18–29)
CO2 SERPL-SCNC: 24 MMOL/L (ref 20–31)
CO2 SERPL-SCNC: 25 MMOL/L (ref 20–31)
CO2 SERPL-SCNC: 29 MMOL/L (ref 20–31)
COHGB MFR BLD: 1.9 % (ref 0–2)
CREAT BLD-MCNC: 0.88 MG/DL (ref 0.6–1.3)
CREAT BLD-MCNC: 0.9 MG/DL (ref 0.6–1.3)
CREAT BLD-MCNC: 0.92 MG/DL (ref 0.6–1.3)
CREAT BLD-MCNC: 0.98 MG/DL (ref 0.6–1.3)
CREAT BLD-MCNC: 1 MG/DL (ref 0.6–1.3)
CREAT BLD-MCNC: 1 MG/DL (ref 0.6–1.3)
CREAT BLD-MCNC: 10.18 MG/DL (ref 0.6–1.3)
CREAT BLD-MCNC: 10.76 MG/DL (ref 0.6–1.3)
CREAT BLD-MCNC: 4.39 MG/DL (ref 0.6–1.3)
CREAT BLD-MCNC: 5.31 MG/DL (ref 0.6–1.3)
CREAT BLD-MCNC: 5.93 MG/DL (ref 0.6–1.3)
CREAT BLD-MCNC: 6.5 MG/DL (ref 0.6–1.3)
CREAT BLD-MCNC: 6.93 MG/DL (ref 0.6–1.3)
CREAT BLD-MCNC: 7.07 MG/DL (ref 0.6–1.3)
CREAT BLD-MCNC: 7.09 MG/DL (ref 0.6–1.3)
CREAT BLD-MCNC: 7.23 MG/DL (ref 0.6–1.3)
CREAT BLD-MCNC: 7.67 MG/DL (ref 0.6–1.3)
CREAT BLD-MCNC: 7.76 MG/DL (ref 0.6–1.3)
CREAT BLD-MCNC: 7.81 MG/DL (ref 0.6–1.3)
CREAT BLD-MCNC: 7.94 MG/DL (ref 0.6–1.3)
CREAT BLD-MCNC: 8.21 MG/DL (ref 0.6–1.3)
CREAT BLD-MCNC: 8.57 MG/DL (ref 0.6–1.3)
CREAT BLD-MCNC: 8.58 MG/DL (ref 0.6–1.3)
CREAT BLD-MCNC: 8.61 MG/DL (ref 0.6–1.3)
CREAT BLD-MCNC: 9.03 MG/DL (ref 0.6–1.3)
CREAT BLD-MCNC: 9.29 MG/DL (ref 0.6–1.3)
CREAT BLDA-MCNC: 0.9 MG/DL (ref 0.6–1.3)
CREAT BLDA-MCNC: 0.9 MG/DL (ref 0.6–1.3)
CREAT BLDA-MCNC: 1 MG/DL (ref 0.6–1.3)
CREAT SERPL-MCNC: 1.01 MG/DL (ref 0.76–1.27)
CREAT SERPL-MCNC: 1.17 MG/DL (ref 0.76–1.27)
CREAT SERPL-MCNC: 1.24 MG/DL (ref 0.76–1.27)
CREAT SERPL-MCNC: 1.25 MG/DL (ref 0.76–1.27)
CYTOLOGIST CVX/VAG CYTO: NORMAL
D DIMER PPP FEU-MCNC: 0.48 MG/L (FEU) (ref 0–0.5)
D-LACTATE SERPL-SCNC: 1.1 MMOL/L (ref 0.5–2)
DEPRECATED RDW RBC AUTO: 65.1 FL (ref 37–54)
DEPRECATED RDW RBC AUTO: 65.9 FL (ref 37–54)
DEPRECATED RDW RBC AUTO: 66.1 FL (ref 37–54)
DEPRECATED RDW RBC AUTO: 66.7 FL (ref 37–54)
DEPRECATED RDW RBC AUTO: 66.7 FL (ref 37–54)
DEPRECATED RDW RBC AUTO: 66.9 FL (ref 37–54)
DEPRECATED RDW RBC AUTO: 67.6 FL (ref 37–54)
DEPRECATED RDW RBC AUTO: 68.8 FL (ref 37–54)
DEPRECATED RDW RBC AUTO: 68.9 FL (ref 37–54)
EOSINOPHIL # BLD AUTO: 0 X10E3/UL (ref 0–0.4)
EOSINOPHIL # BLD AUTO: 0.06 10*3/MM3 (ref 0–0.3)
EOSINOPHIL # BLD AUTO: 0.1 X10E3/UL (ref 0–0.4)
EOSINOPHIL # BLD AUTO: 0.3 X10E3/UL (ref 0–0.4)
EOSINOPHIL # BLD AUTO: 0.5 X10E3/UL (ref 0–0.4)
EOSINOPHIL # BLD MANUAL: 0 10*3/MM3 (ref 0.1–0.3)
EOSINOPHIL # BLD MANUAL: 0 10*3/MM3 (ref 0.1–0.3)
EOSINOPHIL # BLD MANUAL: 0.12 10*3/MM3 (ref 0.1–0.3)
EOSINOPHIL # BLD MANUAL: 0.14 10*3/MM3 (ref 0.1–0.3)
EOSINOPHIL # BLD MANUAL: 0.21 10*3/MM3 (ref 0.1–0.3)
EOSINOPHIL NFR BLD AUTO: 0.2 % (ref 0–3)
EOSINOPHIL NFR BLD AUTO: 1 %
EOSINOPHIL NFR BLD AUTO: 11 %
EOSINOPHIL NFR BLD AUTO: 14 %
EOSINOPHIL NFR BLD AUTO: 5 %
EOSINOPHIL NFR BLD MANUAL: 0 % (ref 0–3)
EOSINOPHIL NFR BLD MANUAL: 0 % (ref 0–3)
EOSINOPHIL NFR BLD MANUAL: 1 % (ref 0–3)
EOSINOPHIL NFR BLD MANUAL: 1 % (ref 0–3)
EOSINOPHIL NFR BLD MANUAL: 3 % (ref 0–3)
EOSINOPHIL SPEC QL MICRO: 0 % EOS/100 CELLS (ref 0–0)
ERYTHROCYTE [DISTWIDTH] IN BLOOD BY AUTOMATED COUNT: 16.9 % (ref 12.3–15.4)
ERYTHROCYTE [DISTWIDTH] IN BLOOD BY AUTOMATED COUNT: 17.1 % (ref 12.3–15.4)
ERYTHROCYTE [DISTWIDTH] IN BLOOD BY AUTOMATED COUNT: 17.2 % (ref 12.3–15.4)
ERYTHROCYTE [DISTWIDTH] IN BLOOD BY AUTOMATED COUNT: 17.3 % (ref 12.3–15.4)
ERYTHROCYTE [DISTWIDTH] IN BLOOD BY AUTOMATED COUNT: 17.7 % (ref 11.3–14.5)
ERYTHROCYTE [DISTWIDTH] IN BLOOD BY AUTOMATED COUNT: 18 % (ref 11.3–14.5)
ERYTHROCYTE [DISTWIDTH] IN BLOOD BY AUTOMATED COUNT: 18 % (ref 11.3–14.5)
ERYTHROCYTE [DISTWIDTH] IN BLOOD BY AUTOMATED COUNT: 18.1 % (ref 11.3–14.5)
ERYTHROCYTE [DISTWIDTH] IN BLOOD BY AUTOMATED COUNT: 18.2 % (ref 11.3–14.5)
ERYTHROCYTE [DISTWIDTH] IN BLOOD BY AUTOMATED COUNT: 18.3 % (ref 11.3–14.5)
ERYTHROCYTE [DISTWIDTH] IN BLOOD BY AUTOMATED COUNT: 18.4 % (ref 11.3–14.5)
ERYTHROCYTE [DISTWIDTH] IN BLOOD BY AUTOMATED COUNT: 18.5 % (ref 11.3–14.5)
ERYTHROCYTE [DISTWIDTH] IN BLOOD BY AUTOMATED COUNT: 18.6 % (ref 11.3–14.5)
ERYTHROCYTE [DISTWIDTH] IN BLOOD BY AUTOMATED COUNT: 18.7 % (ref 11.3–14.5)
ERYTHROCYTE [DISTWIDTH] IN BLOOD BY AUTOMATED COUNT: 18.8 % (ref 11.3–14.5)
ERYTHROCYTE [DISTWIDTH] IN BLOOD BY AUTOMATED COUNT: 19.1 % (ref 11.3–14.5)
ERYTHROCYTE [DISTWIDTH] IN BLOOD BY AUTOMATED COUNT: 19.3 % (ref 11.3–14.5)
ERYTHROCYTE [DISTWIDTH] IN BLOOD BY AUTOMATED COUNT: 19.8 % (ref 11.3–14.5)
ERYTHROCYTE [DISTWIDTH] IN BLOOD BY AUTOMATED COUNT: 20.4 % (ref 11.3–14.5)
ERYTHROCYTE [DISTWIDTH] IN BLOOD BY AUTOMATED COUNT: 20.5 % (ref 11.3–14.5)
FERRITIN SERPL-MCNC: >1650 NG/ML (ref 22–322)
FIBRINOGEN PPP-MCNC: 483 MG/DL (ref 198–466)
FOLATE SERPL-MCNC: >24 NG/ML (ref 3.2–20)
FSP PPP LA-ACNC: NORMAL
GFR SERPL CREATININE-BSD FRML MDRD: 11 ML/MIN/1.73
GFR SERPL CREATININE-BSD FRML MDRD: 13 ML/MIN/1.73
GFR SERPL CREATININE-BSD FRML MDRD: 5 ML/MIN/1.73
GFR SERPL CREATININE-BSD FRML MDRD: 5 ML/MIN/1.73
GFR SERPL CREATININE-BSD FRML MDRD: 6 ML/MIN/1.73
GFR SERPL CREATININE-BSD FRML MDRD: 7 ML/MIN/1.73
GFR SERPL CREATININE-BSD FRML MDRD: 74 ML/MIN/1.73
GFR SERPL CREATININE-BSD FRML MDRD: 74 ML/MIN/1.73
GFR SERPL CREATININE-BSD FRML MDRD: 76 ML/MIN/1.73
GFR SERPL CREATININE-BSD FRML MDRD: 8 ML/MIN/1.73
GFR SERPL CREATININE-BSD FRML MDRD: 81 ML/MIN/1.73
GFR SERPL CREATININE-BSD FRML MDRD: 83 ML/MIN/1.73
GFR SERPL CREATININE-BSD FRML MDRD: 86 ML/MIN/1.73
GFR SERPL CREATININE-BSD FRML MDRD: 9 ML/MIN/1.73
GFR SERPLBLD CREATININE-BSD FMLA CKD-EPI: 58 ML/MIN/1.73
GFR SERPLBLD CREATININE-BSD FMLA CKD-EPI: 59 ML/MIN/1.73
GFR SERPLBLD CREATININE-BSD FMLA CKD-EPI: 63 ML/MIN/1.73
GFR SERPLBLD CREATININE-BSD FMLA CKD-EPI: 67 ML/MIN/1.73
GFR SERPLBLD CREATININE-BSD FMLA CKD-EPI: 68 ML/MIN/1.73
GFR SERPLBLD CREATININE-BSD FMLA CKD-EPI: 73 ML/MIN/1.73
GFR SERPLBLD CREATININE-BSD FMLA CKD-EPI: 75 ML/MIN/1.73
GFR SERPLBLD CREATININE-BSD FMLA CKD-EPI: 87 ML/MIN/1.73
GLOBULIN SER CALC-MCNC: 1.6 G/DL (ref 1.5–4.5)
GLOBULIN SER CALC-MCNC: 1.6 G/DL (ref 1.5–4.5)
GLOBULIN SER CALC-MCNC: 1.9 G/DL (ref 1.5–4.5)
GLOBULIN SER CALC-MCNC: 1.9 G/DL (ref 1.5–4.5)
GLOBULIN UR ELPH-MCNC: 1.4 GM/DL
GLOBULIN UR ELPH-MCNC: 1.6 GM/DL
GLOBULIN UR ELPH-MCNC: 1.7 GM/DL
GLOBULIN UR ELPH-MCNC: 1.8 GM/DL
GLOBULIN UR ELPH-MCNC: 1.9 GM/DL
GLOBULIN UR ELPH-MCNC: 1.9 GM/DL
GLOBULIN UR ELPH-MCNC: 2 GM/DL
GLOBULIN UR ELPH-MCNC: 2.1 GM/DL
GLOBULIN UR ELPH-MCNC: 2.1 GM/DL
GLOBULIN UR ELPH-MCNC: 2.2 GM/DL
GLOBULIN UR ELPH-MCNC: 2.2 GM/DL
GLOBULIN UR ELPH-MCNC: 2.3 GM/DL
GLOBULIN UR ELPH-MCNC: 2.4 GM/DL
GLOBULIN UR ELPH-MCNC: 2.4 GM/DL
GLUCOSE BLD-MCNC: 100 MG/DL (ref 70–100)
GLUCOSE BLD-MCNC: 104 MG/DL (ref 70–100)
GLUCOSE BLD-MCNC: 105 MG/DL (ref 70–100)
GLUCOSE BLD-MCNC: 106 MG/DL (ref 70–100)
GLUCOSE BLD-MCNC: 109 MG/DL (ref 70–100)
GLUCOSE BLD-MCNC: 110 MG/DL (ref 70–100)
GLUCOSE BLD-MCNC: 112 MG/DL (ref 70–100)
GLUCOSE BLD-MCNC: 113 MG/DL (ref 70–100)
GLUCOSE BLD-MCNC: 113 MG/DL (ref 70–100)
GLUCOSE BLD-MCNC: 115 MG/DL (ref 70–100)
GLUCOSE BLD-MCNC: 115 MG/DL (ref 70–100)
GLUCOSE BLD-MCNC: 118 MG/DL (ref 70–100)
GLUCOSE BLD-MCNC: 124 MG/DL (ref 70–100)
GLUCOSE BLD-MCNC: 125 MG/DL (ref 70–100)
GLUCOSE BLD-MCNC: 125 MG/DL (ref 70–100)
GLUCOSE BLD-MCNC: 128 MG/DL (ref 70–100)
GLUCOSE BLD-MCNC: 144 MG/DL (ref 70–100)
GLUCOSE BLD-MCNC: 146 MG/DL (ref 70–100)
GLUCOSE BLD-MCNC: 149 MG/DL (ref 70–100)
GLUCOSE BLD-MCNC: 152 MG/DL (ref 70–100)
GLUCOSE BLD-MCNC: 154 MG/DL (ref 70–100)
GLUCOSE BLD-MCNC: 156 MG/DL (ref 70–100)
GLUCOSE BLD-MCNC: 157 MG/DL (ref 70–100)
GLUCOSE BLD-MCNC: 158 MG/DL (ref 70–100)
GLUCOSE BLD-MCNC: 162 MG/DL (ref 70–100)
GLUCOSE BLD-MCNC: 163 MG/DL (ref 70–100)
GLUCOSE BLD-MCNC: 172 MG/DL (ref 70–100)
GLUCOSE BLD-MCNC: 176 MG/DL (ref 70–100)
GLUCOSE BLD-MCNC: 177 MG/DL (ref 70–100)
GLUCOSE BLD-MCNC: 188 MG/DL (ref 70–100)
GLUCOSE BLD-MCNC: 206 MG/DL (ref 70–100)
GLUCOSE BLDC GLUCOMTR-MCNC: 108 MG/DL (ref 70–130)
GLUCOSE BLDC GLUCOMTR-MCNC: 128 MG/DL (ref 70–130)
GLUCOSE BLDC GLUCOMTR-MCNC: 153 MG/DL (ref 70–130)
GLUCOSE BLDC GLUCOMTR-MCNC: 99 MG/DL (ref 70–130)
GLUCOSE SERPL-MCNC: 143 MG/DL (ref 65–99)
GLUCOSE SERPL-MCNC: 152 MG/DL (ref 65–99)
GLUCOSE SERPL-MCNC: 154 MG/DL (ref 65–99)
GLUCOSE SERPL-MCNC: 194 MG/DL (ref 65–99)
HAV IGM SERPL QL IA: NORMAL
HBA1C MFR BLD: 5.6 % (ref 4.8–5.6)
HBV CORE IGM SERPL QL IA: NORMAL
HBV SURFACE AG SERPL QL IA: NORMAL
HCO3 BLDA-SCNC: 20.4 MMOL/L (ref 20–26)
HCT VFR BLD AUTO: 27.1 % (ref 38.9–50.9)
HCT VFR BLD AUTO: 28.5 % (ref 38.9–50.9)
HCT VFR BLD AUTO: 29.3 % (ref 38.9–50.9)
HCT VFR BLD AUTO: 29.5 % (ref 38.9–50.9)
HCT VFR BLD AUTO: 29.9 % (ref 38.9–50.9)
HCT VFR BLD AUTO: 30.4 % (ref 38.9–50.9)
HCT VFR BLD AUTO: 30.9 % (ref 38.9–50.9)
HCT VFR BLD AUTO: 31.7 % (ref 38.9–50.9)
HCT VFR BLD AUTO: 32.1 % (ref 38.9–50.9)
HCT VFR BLD AUTO: 32.7 % (ref 38.9–50.9)
HCT VFR BLD AUTO: 33.3 % (ref 38.9–50.9)
HCT VFR BLD AUTO: 35.1 % (ref 38.9–50.9)
HCT VFR BLD AUTO: 35.5 % (ref 38.9–50.9)
HCT VFR BLD AUTO: 35.8 % (ref 38.9–50.9)
HCT VFR BLD AUTO: 35.9 % (ref 38.9–50.9)
HCT VFR BLD AUTO: 36.8 % (ref 38.9–50.9)
HCT VFR BLD AUTO: 36.9 % (ref 38.9–50.9)
HCT VFR BLD AUTO: 37 % (ref 38.9–50.9)
HCT VFR BLD AUTO: 37.7 % (ref 38.9–50.9)
HCT VFR BLD AUTO: 38.1 % (ref 38.9–50.9)
HCT VFR BLD AUTO: 38.5 % (ref 38.9–50.9)
HCT VFR BLD AUTO: 38.5 % (ref 38.9–50.9)
HCT VFR BLD AUTO: 38.6 % (ref 38.9–50.9)
HCT VFR BLD AUTO: 39.1 % (ref 37.5–51)
HCT VFR BLD AUTO: 39.1 % (ref 37.5–51)
HCT VFR BLD AUTO: 39.3 % (ref 37.5–51)
HCT VFR BLD AUTO: 39.6 % (ref 38.9–50.9)
HCT VFR BLD AUTO: 39.7 % (ref 37.5–51)
HCT VFR BLD AUTO: 41 % (ref 38.9–50.9)
HCT VFR BLD CALC: 29.9 %
HCV AB SER DONR QL: NORMAL
HGB BLD-MCNC: 10 G/DL (ref 13.1–17.5)
HGB BLD-MCNC: 10.3 G/DL (ref 13.1–17.5)
HGB BLD-MCNC: 10.3 G/DL (ref 13.1–17.5)
HGB BLD-MCNC: 10.4 G/DL (ref 13.1–17.5)
HGB BLD-MCNC: 10.9 G/DL (ref 13.1–17.5)
HGB BLD-MCNC: 11 G/DL (ref 13.1–17.5)
HGB BLD-MCNC: 11.2 G/DL (ref 13.1–17.5)
HGB BLD-MCNC: 11.3 G/DL (ref 13.1–17.5)
HGB BLD-MCNC: 11.4 G/DL (ref 13.1–17.5)
HGB BLD-MCNC: 11.4 G/DL (ref 13.1–17.5)
HGB BLD-MCNC: 11.5 G/DL (ref 13.1–17.5)
HGB BLD-MCNC: 11.6 G/DL (ref 13.1–17.5)
HGB BLD-MCNC: 11.8 G/DL (ref 13.1–17.5)
HGB BLD-MCNC: 11.8 G/DL (ref 13.1–17.5)
HGB BLD-MCNC: 12.1 G/DL (ref 13.1–17.5)
HGB BLD-MCNC: 12.3 G/DL (ref 13–17.7)
HGB BLD-MCNC: 12.4 G/DL (ref 13.1–17.5)
HGB BLD-MCNC: 12.4 G/DL (ref 13.1–17.5)
HGB BLD-MCNC: 12.6 G/DL (ref 13–17.7)
HGB BLD-MCNC: 12.7 G/DL (ref 13–17.7)
HGB BLD-MCNC: 12.8 G/DL (ref 13–17.7)
HGB BLD-MCNC: 13 G/DL (ref 13.1–17.5)
HGB BLD-MCNC: 8.6 G/DL (ref 13.1–17.5)
HGB BLD-MCNC: 9.1 G/DL (ref 13.1–17.5)
HGB BLD-MCNC: 9.3 G/DL (ref 13.1–17.5)
HGB BLD-MCNC: 9.4 G/DL (ref 13.1–17.5)
HGB BLD-MCNC: 9.5 G/DL (ref 13.1–17.5)
HGB BLD-MCNC: 9.6 G/DL (ref 13.1–17.5)
HGB BLD-MCNC: 9.8 G/DL (ref 13.1–17.5)
HGB BLDA-MCNC: 9.8 G/DL (ref 13.5–17.5)
HOROWITZ INDEX BLD+IHG-RTO: 28 %
IMM GRANULOCYTES # BLD: 0 X10E3/UL (ref 0–0.1)
IMM GRANULOCYTES NFR BLD: 0 %
INR PPP: 1.1 (ref 0.91–1.09)
INR PPP: 1.11 (ref 0.91–1.09)
INR PPP: 1.14 (ref 0.91–1.09)
INR PPP: 1.15 (ref 0.91–1.09)
INR PPP: 1.16 (ref 0.91–1.09)
INR PPP: 1.17 (ref 0.91–1.09)
INR PPP: 1.17 (ref 0.91–1.09)
INR PPP: 1.19 (ref 0.91–1.09)
INR PPP: 1.31 (ref 0.91–1.09)
INR PPP: 1.33 (ref 0.91–1.09)
INR PPP: 1.91 (ref 0.91–1.09)
INR PPP: 2.5 (ref 0.91–1.09)
INR PPP: 4.91 (ref 0.91–1.09)
INR PPP: ABNORMAL (ref 0.91–1.09)
IRON 24H UR-MRATE: 81 MCG/DL (ref 50–175)
IRON SATN MFR SERPL: 28 % (ref 20–50)
KAPPA LC SERPL-MCNC: 1020.1 MG/L (ref 3.3–19.4)
KAPPA LC SERPL-MCNC: 1035.9 MG/L (ref 3.3–19.4)
KAPPA LC SERPL-MCNC: 1067.7 MG/L (ref 3.3–19.4)
KAPPA LC SERPL-MCNC: 221.3 MG/L (ref 3.3–19.4)
KAPPA LC SERPL-MCNC: 277.5 MG/L (ref 3.3–19.4)
KAPPA LC SERPL-MCNC: 323 MG/L (ref 3.3–19.4)
KAPPA LC SERPL-MCNC: 913.6 MG/L (ref 3.3–19.4)
KAPPA LC SERPL-MCNC: ABNORMAL MG/L (ref 3.3–19.4)
KAPPA LC/LAMBDA SER: 172.65 {RATIO} (ref 0.26–1.65)
KAPPA LC/LAMBDA SER: 212.47 {RATIO} (ref 0.26–1.65)
KAPPA LC/LAMBDA SER: 344.42 {RATIO} (ref 0.26–1.65)
KAPPA LC/LAMBDA SER: 35.69 {RATIO} (ref 0.26–1.65)
KAPPA LC/LAMBDA SER: 408.04 {RATIO} (ref 0.26–1.65)
KAPPA LC/LAMBDA SER: 4084.67 {RATIO} (ref 0.26–1.65)
KAPPA LC/LAMBDA SER: 53.83 {RATIO} (ref 0.26–1.65)
KAPPA LC/LAMBDA SER: 84.09 {RATIO} (ref 0.26–1.65)
LAMBDA LC FREE SERPL-MCNC: 2.5 MG/L (ref 5.7–26.3)
LAMBDA LC FREE SERPL-MCNC: 2.7 MG/L (ref 5.7–26.3)
LAMBDA LC FREE SERPL-MCNC: 3.1 MG/L (ref 5.7–26.3)
LAMBDA LC FREE SERPL-MCNC: 3.3 MG/L (ref 5.7–26.3)
LAMBDA LC FREE SERPL-MCNC: 4.3 MG/L (ref 5.7–26.3)
LAMBDA LC FREE SERPL-MCNC: 6 MG/L (ref 5.7–26.3)
LAMBDA LC FREE SERPL-MCNC: 6 MG/L (ref 5.7–26.3)
LAMBDA LC FREE SERPL-MCNC: 6.2 MG/L (ref 5.7–26.3)
LDH SERPL-CCNC: 1005 U/L (ref 120–246)
LDH SERPL-CCNC: 1010 U/L (ref 120–246)
LDH SERPL-CCNC: 1140 U/L (ref 120–246)
LDH SERPL-CCNC: 776 U/L (ref 120–246)
LDH SERPL-CCNC: 820 U/L (ref 120–246)
LDH SERPL-CCNC: 864 U/L (ref 120–246)
LDH SERPL-CCNC: 973 U/L (ref 120–246)
LEFT ATRIUM VOLUME INDEX: 23.4 ML/M2
LV EF 2D ECHO EST: 60 %
LV EF 2D ECHO EST: 65 %
LYMPHOCYTES # BLD AUTO: 0.5 X10E3/UL (ref 0.7–3.1)
LYMPHOCYTES # BLD AUTO: 0.6 10*3/MM3 (ref 0.6–4.8)
LYMPHOCYTES # BLD AUTO: 0.7 10*3/MM3 (ref 0.6–4.8)
LYMPHOCYTES # BLD AUTO: 0.7 X10E3/UL (ref 0.7–3.1)
LYMPHOCYTES # BLD AUTO: 0.8 10*3/MM3 (ref 0.6–4.8)
LYMPHOCYTES # BLD AUTO: 0.8 X10E3/UL (ref 0.7–3.1)
LYMPHOCYTES # BLD AUTO: 0.9 10*3/MM3 (ref 0.6–4.8)
LYMPHOCYTES # BLD AUTO: 0.9 10*3/MM3 (ref 0.6–4.8)
LYMPHOCYTES # BLD AUTO: 0.9 X10E3/UL (ref 0.7–3.1)
LYMPHOCYTES # BLD AUTO: 1 10*3/MM3 (ref 0.6–4.8)
LYMPHOCYTES # BLD AUTO: 1 10*3/MM3 (ref 0.6–4.8)
LYMPHOCYTES # BLD AUTO: 1.1 10*3/MM3 (ref 0.6–4.8)
LYMPHOCYTES # BLD AUTO: 1.1 10*3/MM3 (ref 0.6–4.8)
LYMPHOCYTES # BLD AUTO: 1.3 10*3/MM3 (ref 0.6–4.8)
LYMPHOCYTES # BLD AUTO: 2.2 10*3/MM3 (ref 0.6–4.8)
LYMPHOCYTES # BLD AUTO: 3.1 10*3/MM3 (ref 0.6–4.8)
LYMPHOCYTES # BLD AUTO: ABNORMAL 10*3/MM3 (ref 0.6–4.8)
LYMPHOCYTES # BLD MANUAL: 1.79 10*3/MM3 (ref 0.6–4.8)
LYMPHOCYTES # BLD MANUAL: 2.35 10*3/MM3 (ref 0.6–4.8)
LYMPHOCYTES # BLD MANUAL: 3.38 10*3/MM3 (ref 0.6–4.8)
LYMPHOCYTES # BLD MANUAL: 5.55 10*3/MM3 (ref 0.6–4.8)
LYMPHOCYTES # BLD MANUAL: 8.82 10*3/MM3 (ref 0.6–4.8)
LYMPHOCYTES NFR BLD AUTO: 11.2 % (ref 24–44)
LYMPHOCYTES NFR BLD AUTO: 11.5 % (ref 24–44)
LYMPHOCYTES NFR BLD AUTO: 11.6 % (ref 24–44)
LYMPHOCYTES NFR BLD AUTO: 12.3 % (ref 24–44)
LYMPHOCYTES NFR BLD AUTO: 12.4 % (ref 24–44)
LYMPHOCYTES NFR BLD AUTO: 14 %
LYMPHOCYTES NFR BLD AUTO: 17.1 % (ref 24–44)
LYMPHOCYTES NFR BLD AUTO: 17.2 % (ref 24–44)
LYMPHOCYTES NFR BLD AUTO: 17.4 % (ref 24–44)
LYMPHOCYTES NFR BLD AUTO: 19.2 % (ref 24–44)
LYMPHOCYTES NFR BLD AUTO: 22.6 % (ref 24–44)
LYMPHOCYTES NFR BLD AUTO: 23 %
LYMPHOCYTES NFR BLD AUTO: 24.3 % (ref 24–44)
LYMPHOCYTES NFR BLD AUTO: 25.4 % (ref 24–44)
LYMPHOCYTES NFR BLD AUTO: 26.9 % (ref 24–44)
LYMPHOCYTES NFR BLD AUTO: 27 % (ref 24–44)
LYMPHOCYTES NFR BLD AUTO: 27.5 % (ref 24–44)
LYMPHOCYTES NFR BLD AUTO: 28.1 % (ref 24–44)
LYMPHOCYTES NFR BLD AUTO: 29 %
LYMPHOCYTES NFR BLD AUTO: 29 %
LYMPHOCYTES NFR BLD AUTO: ABNORMAL % (ref 24–44)
LYMPHOCYTES NFR BLD MANUAL: 10 % (ref 0–12)
LYMPHOCYTES NFR BLD MANUAL: 19 % (ref 24–44)
LYMPHOCYTES NFR BLD MANUAL: 23 % (ref 24–44)
LYMPHOCYTES NFR BLD MANUAL: 24 % (ref 24–44)
LYMPHOCYTES NFR BLD MANUAL: 26 % (ref 24–44)
LYMPHOCYTES NFR BLD MANUAL: 27 % (ref 24–44)
LYMPHOCYTES NFR BLD MANUAL: 4 % (ref 0–12)
LYMPHOCYTES NFR BLD MANUAL: 6 % (ref 0–12)
LYMPHOCYTES NFR BLD MANUAL: 8 % (ref 0–12)
LYMPHOCYTES NFR BLD MANUAL: 9 % (ref 0–12)
MAGNESIUM SERPL-MCNC: 1.8 MG/DL (ref 1.3–2.7)
MAGNESIUM SERPL-MCNC: 1.9 MG/DL (ref 1.3–2.7)
MAGNESIUM SERPL-MCNC: 1.9 MG/DL (ref 1.3–2.7)
MAGNESIUM SERPL-MCNC: 2 MG/DL (ref 1.3–2.7)
MAGNESIUM SERPL-MCNC: 2 MG/DL (ref 1.3–2.7)
MAGNESIUM SERPL-MCNC: 2 MG/DL (ref 1.6–2.3)
MAGNESIUM SERPL-MCNC: 2.1 MG/DL (ref 1.6–2.3)
MAGNESIUM SERPL-MCNC: 2.5 MG/DL (ref 1.3–2.7)
MAGNESIUM SERPL-MCNC: 2.6 MG/DL (ref 1.3–2.7)
MAGNESIUM SERPL-MCNC: 2.7 MG/DL (ref 1.3–2.7)
MAGNESIUM SERPL-MCNC: 2.8 MG/DL (ref 1.3–2.7)
MAGNESIUM SERPL-MCNC: 2.8 MG/DL (ref 1.3–2.7)
MAGNESIUM SERPL-MCNC: 2.9 MG/DL (ref 1.3–2.7)
MAXIMAL PREDICTED HEART RATE: 150 BPM
MCH RBC QN AUTO: 29.6 PG (ref 27–31)
MCH RBC QN AUTO: 29.7 PG (ref 27–31)
MCH RBC QN AUTO: 29.7 PG (ref 27–31)
MCH RBC QN AUTO: 29.8 PG (ref 27–31)
MCH RBC QN AUTO: 30 PG (ref 27–31)
MCH RBC QN AUTO: 30.1 PG (ref 27–31)
MCH RBC QN AUTO: 30.1 PG (ref 27–31)
MCH RBC QN AUTO: 30.3 PG (ref 27–31)
MCH RBC QN AUTO: 30.4 PG (ref 27–31)
MCH RBC QN AUTO: 30.5 PG (ref 27–31)
MCH RBC QN AUTO: 30.6 PG (ref 27–31)
MCH RBC QN AUTO: 30.6 PG (ref 27–31)
MCH RBC QN AUTO: 30.8 PG (ref 27–31)
MCH RBC QN AUTO: 30.9 PG (ref 26.6–33)
MCH RBC QN AUTO: 30.9 PG (ref 27–31)
MCH RBC QN AUTO: 30.9 PG (ref 27–31)
MCH RBC QN AUTO: 31 PG (ref 27–31)
MCH RBC QN AUTO: 31.1 PG (ref 26.6–33)
MCH RBC QN AUTO: 31.1 PG (ref 27–31)
MCH RBC QN AUTO: 31.1 PG (ref 27–31)
MCH RBC QN AUTO: 31.2 PG (ref 27–31)
MCH RBC QN AUTO: 31.3 PG (ref 26.6–33)
MCH RBC QN AUTO: 31.3 PG (ref 27–31)
MCH RBC QN AUTO: 31.4 PG (ref 26.6–33)
MCH RBC QN AUTO: 31.4 PG (ref 27–31)
MCH RBC QN AUTO: 31.4 PG (ref 27–31)
MCH RBC QN AUTO: 31.6 PG (ref 27–31)
MCH RBC QN AUTO: 31.6 PG (ref 27–31)
MCH RBC QN AUTO: 32 PG (ref 27–31)
MCHC RBC AUTO-ENTMCNC: 30.6 G/DL (ref 32–36)
MCHC RBC AUTO-ENTMCNC: 30.6 G/DL (ref 32–36)
MCHC RBC AUTO-ENTMCNC: 30.7 G/DL (ref 32–36)
MCHC RBC AUTO-ENTMCNC: 30.8 G/DL (ref 32–36)
MCHC RBC AUTO-ENTMCNC: 31 G/DL (ref 32–36)
MCHC RBC AUTO-ENTMCNC: 31 G/DL (ref 32–36)
MCHC RBC AUTO-ENTMCNC: 31.2 G/DL (ref 32–36)
MCHC RBC AUTO-ENTMCNC: 31.2 G/DL (ref 32–36)
MCHC RBC AUTO-ENTMCNC: 31.3 G/DL (ref 32–36)
MCHC RBC AUTO-ENTMCNC: 31.4 G/DL (ref 32–36)
MCHC RBC AUTO-ENTMCNC: 31.5 G/DL (ref 31.5–35.7)
MCHC RBC AUTO-ENTMCNC: 31.5 G/DL (ref 32–36)
MCHC RBC AUTO-ENTMCNC: 31.7 G/DL (ref 31.5–35.7)
MCHC RBC AUTO-ENTMCNC: 31.7 G/DL (ref 32–36)
MCHC RBC AUTO-ENTMCNC: 31.9 G/DL (ref 32–36)
MCHC RBC AUTO-ENTMCNC: 32 G/DL (ref 32–36)
MCHC RBC AUTO-ENTMCNC: 32 G/DL (ref 32–36)
MCHC RBC AUTO-ENTMCNC: 32.1 G/DL (ref 32–36)
MCHC RBC AUTO-ENTMCNC: 32.3 G/DL (ref 31.5–35.7)
MCHC RBC AUTO-ENTMCNC: 32.7 G/DL (ref 31.5–35.7)
MCV RBC AUTO: 100.3 FL (ref 80–99)
MCV RBC AUTO: 96 FL (ref 79–97)
MCV RBC AUTO: 96.3 FL (ref 80–99)
MCV RBC AUTO: 96.4 FL (ref 80–99)
MCV RBC AUTO: 96.4 FL (ref 80–99)
MCV RBC AUTO: 96.5 FL (ref 80–99)
MCV RBC AUTO: 96.8 FL (ref 80–99)
MCV RBC AUTO: 96.9 FL (ref 80–99)
MCV RBC AUTO: 96.9 FL (ref 80–99)
MCV RBC AUTO: 97 FL (ref 79–97)
MCV RBC AUTO: 97 FL (ref 79–97)
MCV RBC AUTO: 97 FL (ref 80–99)
MCV RBC AUTO: 97.1 FL (ref 80–99)
MCV RBC AUTO: 97.2 FL (ref 80–99)
MCV RBC AUTO: 97.3 FL (ref 80–99)
MCV RBC AUTO: 97.3 FL (ref 80–99)
MCV RBC AUTO: 97.4 FL (ref 80–99)
MCV RBC AUTO: 97.4 FL (ref 80–99)
MCV RBC AUTO: 97.5 FL (ref 80–99)
MCV RBC AUTO: 97.6 FL (ref 80–99)
MCV RBC AUTO: 97.6 FL (ref 80–99)
MCV RBC AUTO: 97.9 FL (ref 80–99)
MCV RBC AUTO: 98.1 FL (ref 80–99)
MCV RBC AUTO: 98.7 FL (ref 80–99)
MCV RBC AUTO: 98.7 FL (ref 80–99)
MCV RBC AUTO: 99 FL (ref 79–97)
MCV RBC AUTO: 99.7 FL (ref 80–99)
METAMYELOCYTES NFR BLD MANUAL: 1 % (ref 0–0)
METAMYELOCYTES NFR BLD MANUAL: 1 % (ref 0–0)
METAMYELOCYTES NFR BLD MANUAL: 3 % (ref 0–0)
METHGB BLD QL: 0.9 % (ref 0–1.5)
MODALITY: ABNORMAL
MONOCYTES # BLD AUTO: 0.1 10*3/MM3 (ref 0–1)
MONOCYTES # BLD AUTO: 0.2 10*3/MM3 (ref 0–1)
MONOCYTES # BLD AUTO: 0.2 10*3/MM3 (ref 0–1)
MONOCYTES # BLD AUTO: 0.2 X10E3/UL (ref 0.1–0.9)
MONOCYTES # BLD AUTO: 0.2 X10E3/UL (ref 0.1–0.9)
MONOCYTES # BLD AUTO: 0.3 10*3/MM3 (ref 0–1)
MONOCYTES # BLD AUTO: 0.3 X10E3/UL (ref 0.1–0.9)
MONOCYTES # BLD AUTO: 0.4 10*3/MM3 (ref 0–1)
MONOCYTES # BLD AUTO: 0.4 X10E3/UL (ref 0.1–0.9)
MONOCYTES # BLD AUTO: 0.49 10*3/MM3 (ref 0–1)
MONOCYTES # BLD AUTO: 0.5 10*3/MM3 (ref 0–1)
MONOCYTES # BLD AUTO: 0.5 10*3/MM3 (ref 0–1)
MONOCYTES # BLD AUTO: 0.69 10*3/MM3 (ref 0–1)
MONOCYTES # BLD AUTO: 1.13 10*3/MM3 (ref 0–1)
MONOCYTES # BLD AUTO: 1.9 10*3/MM3 (ref 0–1)
MONOCYTES # BLD AUTO: 1.96 10*3/MM3 (ref 0–1)
MONOCYTES # BLD AUTO: 2.17 10*3/MM3 (ref 0–1)
MONOCYTES # BLD AUTO: 2.7 10*3/MM3 (ref 0–1)
MONOCYTES # BLD AUTO: ABNORMAL 10*3/MM3 (ref 0–1)
MONOCYTES NFR BLD AUTO: 11 %
MONOCYTES NFR BLD AUTO: 13 %
MONOCYTES NFR BLD AUTO: 2.6 % (ref 0–12)
MONOCYTES NFR BLD AUTO: 23.3 % (ref 0–12)
MONOCYTES NFR BLD AUTO: 23.5 % (ref 0–12)
MONOCYTES NFR BLD AUTO: 4 %
MONOCYTES NFR BLD AUTO: 4.5 % (ref 0–12)
MONOCYTES NFR BLD AUTO: 4.5 % (ref 0–12)
MONOCYTES NFR BLD AUTO: 5.5 % (ref 0–12)
MONOCYTES NFR BLD AUTO: 6.1 % (ref 0–12)
MONOCYTES NFR BLD AUTO: 6.3 % (ref 0–12)
MONOCYTES NFR BLD AUTO: 6.4 % (ref 0–12)
MONOCYTES NFR BLD AUTO: 6.5 % (ref 0–12)
MONOCYTES NFR BLD AUTO: 7 %
MONOCYTES NFR BLD AUTO: 7.5 % (ref 0–12)
MONOCYTES NFR BLD AUTO: 7.5 % (ref 0–12)
MONOCYTES NFR BLD AUTO: 8.6 % (ref 0–12)
MONOCYTES NFR BLD AUTO: 8.6 % (ref 0–12)
MONOCYTES NFR BLD AUTO: 8.8 % (ref 0–12)
MONOCYTES NFR BLD AUTO: 9.3 % (ref 0–12)
MONOCYTES NFR BLD AUTO: ABNORMAL % (ref 0–12)
MORPHOLOGY BLD-IMP: (no result)
MORPHOLOGY BLD-IMP: (no result)
MORPHOLOGY BLD-IMP: ABNORMAL
MYELOCYTES NFR BLD MANUAL: 1 % (ref 0–0)
MYELOCYTES NFR BLD MANUAL: 2 % (ref 0–0)
MYOGLOBIN SERPL-MCNC: 1238 NG/ML (ref 3–110)
NEUTROPHILS # BLD AUTO: 1.2 X10E3/UL (ref 1.4–7)
NEUTROPHILS # BLD AUTO: 1.7 X10E3/UL (ref 1.4–7)
NEUTROPHILS # BLD AUTO: 1.9 X10E3/UL (ref 1.4–7)
NEUTROPHILS # BLD AUTO: 15.67 10*3/MM3 (ref 1.5–8.3)
NEUTROPHILS # BLD AUTO: 2.2 10*3/MM3 (ref 1.5–8.3)
NEUTROPHILS # BLD AUTO: 2.5 10*3/MM3 (ref 1.5–8.3)
NEUTROPHILS # BLD AUTO: 2.9 10*3/MM3 (ref 1.5–8.3)
NEUTROPHILS # BLD AUTO: 3 X10E3/UL (ref 1.4–7)
NEUTROPHILS # BLD AUTO: 3.03 10*3/MM3 (ref 1.5–8.3)
NEUTROPHILS # BLD AUTO: 3.2 10*3/MM3 (ref 1.5–8.3)
NEUTROPHILS # BLD AUTO: 3.4 10*3/MM3 (ref 1.5–8.3)
NEUTROPHILS # BLD AUTO: 3.4 10*3/MM3 (ref 1.5–8.3)
NEUTROPHILS # BLD AUTO: 3.7 10*3/MM3 (ref 1.5–8.3)
NEUTROPHILS # BLD AUTO: 3.8 10*3/MM3 (ref 1.5–8.3)
NEUTROPHILS # BLD AUTO: 3.9 10*3/MM3 (ref 1.5–8.3)
NEUTROPHILS # BLD AUTO: 4.1 10*3/MM3 (ref 1.5–8.3)
NEUTROPHILS # BLD AUTO: 4.1 10*3/MM3 (ref 1.5–8.3)
NEUTROPHILS # BLD AUTO: 5.3 10*3/MM3 (ref 1.5–8.3)
NEUTROPHILS # BLD AUTO: 5.4 10*3/MM3 (ref 1.5–8.3)
NEUTROPHILS # BLD AUTO: 5.5 10*3/MM3 (ref 1.5–8.3)
NEUTROPHILS # BLD AUTO: 5.8 10*3/MM3 (ref 1.5–8.3)
NEUTROPHILS # BLD AUTO: 6.2 10*3/MM3 (ref 1.5–8.3)
NEUTROPHILS # BLD AUTO: 6.75 10*3/MM3 (ref 1.5–8.3)
NEUTROPHILS # BLD AUTO: 6.8 10*3/MM3 (ref 1.5–8.3)
NEUTROPHILS # BLD AUTO: 7.48 10*3/MM3 (ref 1.5–8.3)
NEUTROPHILS # BLD AUTO: ABNORMAL 10*3/MM3 (ref 1.5–8.3)
NEUTROPHILS NFR BLD AUTO: 48.4 % (ref 41–71)
NEUTROPHILS NFR BLD AUTO: 49 %
NEUTROPHILS NFR BLD AUTO: 49.7 % (ref 41–71)
NEUTROPHILS NFR BLD AUTO: 52 %
NEUTROPHILS NFR BLD AUTO: 56 %
NEUTROPHILS NFR BLD AUTO: 64.3 % (ref 41–71)
NEUTROPHILS NFR BLD AUTO: 65.3 % (ref 41–71)
NEUTROPHILS NFR BLD AUTO: 66 % (ref 41–71)
NEUTROPHILS NFR BLD AUTO: 68.8 % (ref 41–71)
NEUTROPHILS NFR BLD AUTO: 69.6 % (ref 41–71)
NEUTROPHILS NFR BLD AUTO: 73.3 % (ref 41–71)
NEUTROPHILS NFR BLD AUTO: 74.3 % (ref 41–71)
NEUTROPHILS NFR BLD AUTO: 76.3 % (ref 41–71)
NEUTROPHILS NFR BLD AUTO: 77.3 % (ref 41–71)
NEUTROPHILS NFR BLD AUTO: 80 %
NEUTROPHILS NFR BLD AUTO: 80.9 % (ref 41–71)
NEUTROPHILS NFR BLD AUTO: 81.2 % (ref 41–71)
NEUTROPHILS NFR BLD AUTO: 83.2 % (ref 41–71)
NEUTROPHILS NFR BLD AUTO: 84.3 % (ref 41–71)
NEUTROPHILS NFR BLD AUTO: 85.9 % (ref 41–71)
NEUTROPHILS NFR BLD AUTO: ABNORMAL % (ref 41–71)
NEUTROPHILS NFR BLD MANUAL: 31 % (ref 41–71)
NEUTROPHILS NFR BLD MANUAL: 43 % (ref 41–71)
NEUTROPHILS NFR BLD MANUAL: 44 % (ref 41–71)
NEUTROPHILS NFR BLD MANUAL: 44 % (ref 41–71)
NEUTROPHILS NFR BLD MANUAL: 46 % (ref 41–71)
NEUTS BAND NFR BLD MANUAL: 12 % (ref 0–5)
NEUTS BAND NFR BLD MANUAL: 2 % (ref 0–5)
NEUTS BAND NFR BLD MANUAL: 4 % (ref 0–5)
NRBC SPEC MANUAL: 1 /100 WBC (ref 0–0)
NRBC SPEC MANUAL: 2 /100 WBC (ref 0–0)
OXCARBAZEPINE: 6 UG/ML (ref 10–35)
OXYHGB MFR BLDV: 95.1 % (ref 94–99)
PATH INTERP BLD-IMP: NORMAL
PCO2 BLDA: 34.4 MM HG (ref 35–48)
PH BLDA: 7.38 PH UNITS (ref 7.35–7.45)
PHOSPHATE SERPL-MCNC: 1.3 MG/DL (ref 2.4–5.1)
PHOSPHATE SERPL-MCNC: 11.2 MG/DL (ref 2.4–5.1)
PHOSPHATE SERPL-MCNC: 2.4 MG/DL (ref 2.4–5.1)
PHOSPHATE SERPL-MCNC: 3 MG/DL (ref 2.4–5.1)
PHOSPHATE SERPL-MCNC: 3 MG/DL (ref 2.4–5.1)
PHOSPHATE SERPL-MCNC: 3.2 MG/DL (ref 2.4–5.1)
PHOSPHATE SERPL-MCNC: 3.3 MG/DL (ref 2.5–4.5)
PHOSPHATE SERPL-MCNC: 3.3 MG/DL (ref 2.5–4.5)
PHOSPHATE SERPL-MCNC: 4.7 MG/DL (ref 2.4–5.1)
PHOSPHATE SERPL-MCNC: 5.2 MG/DL (ref 2.4–5.1)
PHOSPHATE SERPL-MCNC: 6.6 MG/DL (ref 2.4–5.1)
PHOSPHATE SERPL-MCNC: 7.2 MG/DL (ref 2.4–5.1)
PHOSPHATE SERPL-MCNC: 7.5 MG/DL (ref 2.4–5.1)
PHOSPHATE SERPL-MCNC: 7.6 MG/DL (ref 2.4–5.1)
PHOSPHATE SERPL-MCNC: 8.5 MG/DL (ref 2.4–5.1)
PHOSPHATE SERPL-MCNC: 8.5 MG/DL (ref 2.4–5.1)
PHOSPHATE SERPL-MCNC: 8.6 MG/DL (ref 2.4–5.1)
PHOSPHATE SERPL-MCNC: 9 MG/DL (ref 2.4–5.1)
PHOSPHATE SERPL-MCNC: 9.9 MG/DL (ref 2.4–5.1)
PLASMA CELL PREC NFR BLD MANUAL: 14 % (ref 0–0)
PLASMA CELL PREC NFR BLD MANUAL: 17 % (ref 0–0)
PLASMA CELL PREC NFR BLD MANUAL: 17 % (ref 0–0)
PLASMA CELL PREC NFR BLD MANUAL: 18 % (ref 0–0)
PLASMA CELL PREC NFR BLD MANUAL: 37 % (ref 0–0)
PLAT MORPH BLD: NORMAL
PLATELET # BLD AUTO: 100 10*3/MM3 (ref 150–450)
PLATELET # BLD AUTO: 104 10*3/MM3 (ref 150–450)
PLATELET # BLD AUTO: 106 10*3/MM3 (ref 150–450)
PLATELET # BLD AUTO: 109 10*3/MM3 (ref 150–450)
PLATELET # BLD AUTO: 109 10*3/MM3 (ref 150–450)
PLATELET # BLD AUTO: 116 10*3/MM3 (ref 150–450)
PLATELET # BLD AUTO: 119 10*3/MM3 (ref 150–450)
PLATELET # BLD AUTO: 121 X10E3/UL (ref 150–379)
PLATELET # BLD AUTO: 124 10*3/MM3 (ref 150–450)
PLATELET # BLD AUTO: 124 10*3/MM3 (ref 150–450)
PLATELET # BLD AUTO: 137 X10E3/UL (ref 150–379)
PLATELET # BLD AUTO: 23 10*3/MM3 (ref 150–450)
PLATELET # BLD AUTO: 25 10*3/MM3 (ref 150–450)
PLATELET # BLD AUTO: 27 10*3/MM3 (ref 150–450)
PLATELET # BLD AUTO: 47 10*3/MM3 (ref 150–450)
PLATELET # BLD AUTO: 49 10*3/MM3 (ref 150–450)
PLATELET # BLD AUTO: 51 10*3/MM3 (ref 150–450)
PLATELET # BLD AUTO: 58 10*3/MM3 (ref 150–450)
PLATELET # BLD AUTO: 65 10*3/MM3 (ref 150–450)
PLATELET # BLD AUTO: 66 10*3/MM3 (ref 150–450)
PLATELET # BLD AUTO: 72 10*3/MM3 (ref 150–450)
PLATELET # BLD AUTO: 76 10*3/MM3 (ref 150–450)
PLATELET # BLD AUTO: 84 10*3/MM3 (ref 150–450)
PLATELET # BLD AUTO: 84 10*3/MM3 (ref 150–450)
PLATELET # BLD AUTO: 88 10*3/MM3 (ref 150–450)
PLATELET # BLD AUTO: 92 X10E3/UL (ref 150–379)
PLATELET # BLD AUTO: 94 10*3/MM3 (ref 150–450)
PLATELET # BLD AUTO: 94 10*3/MM3 (ref 150–450)
PLATELET # BLD AUTO: 94 X10E3/UL (ref 150–379)
PMV BLD AUTO: 10 FL (ref 6–12)
PMV BLD AUTO: 10.7 FL (ref 6–12)
PMV BLD AUTO: 11 FL (ref 6–12)
PMV BLD AUTO: 11.1 FL (ref 6–12)
PMV BLD AUTO: 11.3 FL (ref 6–12)
PMV BLD AUTO: 11.5 FL (ref 6–12)
PMV BLD AUTO: 11.7 FL (ref 6–12)
PMV BLD AUTO: 8 FL (ref 6–12)
PMV BLD AUTO: 8.2 FL (ref 6–12)
PMV BLD AUTO: 8.3 FL (ref 6–12)
PMV BLD AUTO: 8.4 FL (ref 6–12)
PMV BLD AUTO: 8.4 FL (ref 6–12)
PMV BLD AUTO: 8.6 FL (ref 6–12)
PMV BLD AUTO: 9 FL (ref 6–12)
PMV BLD AUTO: 9.7 FL (ref 6–12)
PMV BLD AUTO: 9.7 FL (ref 6–12)
PMV BLD AUTO: 9.8 FL (ref 6–12)
PO2 BLDA: 98.2 MM HG (ref 83–108)
POTASSIUM BLD-SCNC: 3.5 MMOL/L (ref 3.5–5.5)
POTASSIUM BLD-SCNC: 3.6 MMOL/L (ref 3.5–5.5)
POTASSIUM BLD-SCNC: 3.9 MMOL/L (ref 3.5–5.5)
POTASSIUM BLD-SCNC: 3.9 MMOL/L (ref 3.5–5.5)
POTASSIUM BLD-SCNC: 4 MMOL/L (ref 3.5–5.5)
POTASSIUM BLD-SCNC: 4.1 MMOL/L (ref 3.5–5.5)
POTASSIUM BLD-SCNC: 4.1 MMOL/L (ref 3.5–5.5)
POTASSIUM BLD-SCNC: 4.2 MMOL/L (ref 3.5–5.5)
POTASSIUM BLD-SCNC: 4.5 MMOL/L (ref 3.5–5.5)
POTASSIUM BLD-SCNC: 4.6 MMOL/L (ref 3.5–5.5)
POTASSIUM BLD-SCNC: 4.6 MMOL/L (ref 3.5–5.5)
POTASSIUM BLD-SCNC: 4.8 MMOL/L (ref 3.5–5.5)
POTASSIUM BLD-SCNC: 5 MMOL/L (ref 3.5–5.5)
POTASSIUM BLD-SCNC: 5.1 MMOL/L (ref 3.5–5.5)
POTASSIUM BLD-SCNC: 5.1 MMOL/L (ref 3.5–5.5)
POTASSIUM BLD-SCNC: 5.2 MMOL/L (ref 3.5–5.5)
POTASSIUM BLD-SCNC: 5.3 MMOL/L (ref 3.5–5.5)
POTASSIUM BLD-SCNC: 5.4 MMOL/L (ref 3.5–5.5)
POTASSIUM BLD-SCNC: 5.5 MMOL/L (ref 3.5–5.5)
POTASSIUM BLD-SCNC: 5.5 MMOL/L (ref 3.5–5.5)
POTASSIUM BLD-SCNC: 5.7 MMOL/L (ref 3.5–5.5)
POTASSIUM BLD-SCNC: 5.7 MMOL/L (ref 3.5–5.5)
POTASSIUM BLD-SCNC: 6.1 MMOL/L (ref 3.5–5.5)
POTASSIUM BLD-SCNC: 6.6 MMOL/L (ref 3.5–5.5)
POTASSIUM SERPL-SCNC: 4.2 MMOL/L (ref 3.5–5.2)
POTASSIUM SERPL-SCNC: 4.4 MMOL/L (ref 3.5–5.2)
POTASSIUM SERPL-SCNC: 4.5 MMOL/L (ref 3.5–5.2)
POTASSIUM SERPL-SCNC: 4.6 MMOL/L (ref 3.5–5.2)
PREALB SERPL-MCNC: 20.4 MG/DL (ref 10–40)
PROCALCITONIN SERPL-MCNC: 0.62 NG/ML
PROT SERPL-MCNC: 5.3 G/DL (ref 5.7–8.2)
PROT SERPL-MCNC: 5.4 G/DL (ref 5.7–8.2)
PROT SERPL-MCNC: 5.5 G/DL (ref 5.7–8.2)
PROT SERPL-MCNC: 5.5 G/DL (ref 5.7–8.2)
PROT SERPL-MCNC: 5.5 G/DL (ref 6–8.5)
PROT SERPL-MCNC: 5.6 G/DL (ref 5.7–8.2)
PROT SERPL-MCNC: 5.6 G/DL (ref 5.7–8.2)
PROT SERPL-MCNC: 5.7 G/DL (ref 5.7–8.2)
PROT SERPL-MCNC: 5.8 G/DL (ref 6–8.5)
PROT SERPL-MCNC: 5.9 G/DL (ref 5.7–8.2)
PROT SERPL-MCNC: 5.9 G/DL (ref 5.7–8.2)
PROT SERPL-MCNC: 5.9 G/DL (ref 6–8.5)
PROT SERPL-MCNC: 6 G/DL (ref 5.7–8.2)
PROT SERPL-MCNC: 6.1 G/DL (ref 5.7–8.2)
PROT SERPL-MCNC: 6.1 G/DL (ref 6–8.5)
PROT SERPL-MCNC: 6.3 G/DL (ref 5.7–8.2)
PROT SERPL-MCNC: 6.5 G/DL (ref 5.7–8.2)
PROT SERPL-MCNC: 6.6 G/DL (ref 5.7–8.2)
PROT SERPL-MCNC: 6.7 G/DL (ref 5.7–8.2)
PROT SERPL-MCNC: 6.9 G/DL (ref 5.7–8.2)
PROT SERPL-MCNC: 7 G/DL (ref 5.7–8.2)
PROTHROMBIN TIME: 11.6 SECONDS (ref 9.6–11.5)
PROTHROMBIN TIME: 11.7 SECONDS (ref 9.6–11.5)
PROTHROMBIN TIME: 12 SECONDS (ref 9.6–11.5)
PROTHROMBIN TIME: 12.1 SECONDS (ref 9.6–11.5)
PROTHROMBIN TIME: 12.2 SECONDS (ref 9.6–11.5)
PROTHROMBIN TIME: 12.3 SECONDS (ref 9.6–11.5)
PROTHROMBIN TIME: 12.3 SECONDS (ref 9.6–11.5)
PROTHROMBIN TIME: 12.5 SECONDS (ref 9.6–11.5)
PROTHROMBIN TIME: 13.8 SECONDS (ref 9.6–11.5)
PROTHROMBIN TIME: 14 SECONDS (ref 9.6–11.5)
PROTHROMBIN TIME: 20.1 SECONDS (ref 9.6–11.5)
PROTHROMBIN TIME: 26.3 SECONDS (ref 9.6–11.5)
PROTHROMBIN TIME: 51.6 SECONDS (ref 9.6–11.5)
PROTHROMBIN TIME: >100 SECONDS (ref 9.6–11.5)
RBC # BLD AUTO: 2.78 10*6/MM3 (ref 4.2–5.76)
RBC # BLD AUTO: 2.91 10*6/MM3 (ref 4.2–5.76)
RBC # BLD AUTO: 2.96 10*6/MM3 (ref 4.2–5.76)
RBC # BLD AUTO: 2.97 10*6/MM3 (ref 4.2–5.76)
RBC # BLD AUTO: 3 10*6/MM3 (ref 4.2–5.76)
RBC # BLD AUTO: 3.08 10*6/MM3 (ref 4.2–5.76)
RBC # BLD AUTO: 3.15 10*6/MM3 (ref 4.2–5.76)
RBC # BLD AUTO: 3.18 10*6/MM3 (ref 4.2–5.76)
RBC # BLD AUTO: 3.26 10*6/MM3 (ref 4.2–5.76)
RBC # BLD AUTO: 3.31 10*6/MM3 (ref 4.2–5.76)
RBC # BLD AUTO: 3.45 10*6/MM3 (ref 4.2–5.76)
RBC # BLD AUTO: 3.62 10*6/MM3 (ref 4.2–5.76)
RBC # BLD AUTO: 3.67 10*6/MM3 (ref 4.2–5.76)
RBC # BLD AUTO: 3.68 10*6/MM3 (ref 4.2–5.76)
RBC # BLD AUTO: 3.71 10*6/MM3 (ref 4.2–5.76)
RBC # BLD AUTO: 3.8 10*6/MM3 (ref 4.2–5.76)
RBC # BLD AUTO: 3.8 10*6/MM3 (ref 4.2–5.76)
RBC # BLD AUTO: 3.83 10*6/MM3 (ref 4.2–5.76)
RBC # BLD AUTO: 3.88 10*6/MM3 (ref 4.2–5.76)
RBC # BLD AUTO: 3.91 10*6/MM3 (ref 4.2–5.76)
RBC # BLD AUTO: 3.95 X10E6/UL (ref 4.14–5.8)
RBC # BLD AUTO: 3.96 10*6/MM3 (ref 4.2–5.76)
RBC # BLD AUTO: 3.97 10*6/MM3 (ref 4.2–5.76)
RBC # BLD AUTO: 3.99 10*6/MM3 (ref 4.2–5.76)
RBC # BLD AUTO: 4.06 X10E6/UL (ref 4.14–5.8)
RBC # BLD AUTO: 4.08 10*6/MM3 (ref 4.2–5.76)
RBC # BLD AUTO: 4.08 X10E6/UL (ref 4.14–5.8)
RBC # BLD AUTO: 4.08 X10E6/UL (ref 4.14–5.8)
RBC # BLD AUTO: 4.19 10*6/MM3 (ref 4.2–5.76)
RBC MORPH BLD: NORMAL
RETICS/RBC NFR AUTO: 1.02 % (ref 0.5–1.5)
RETICS/RBC NFR AUTO: 1.27 % (ref 0.5–1.5)
RH BLD: POSITIVE
RH BLD: POSITIVE
SAO2 % BLDCOA: 95 %
SMALL PLATELETS BLD QL SMEAR: ABNORMAL
SODIUM BLD-SCNC: 136 MMOL/L (ref 132–146)
SODIUM BLD-SCNC: 137 MMOL/L (ref 132–146)
SODIUM BLD-SCNC: 137 MMOL/L (ref 132–146)
SODIUM BLD-SCNC: 138 MMOL/L (ref 132–146)
SODIUM BLD-SCNC: 139 MMOL/L (ref 132–146)
SODIUM BLD-SCNC: 141 MMOL/L (ref 132–146)
SODIUM BLD-SCNC: 142 MMOL/L (ref 132–146)
SODIUM BLD-SCNC: 143 MMOL/L (ref 132–146)
SODIUM BLD-SCNC: 144 MMOL/L (ref 132–146)
SODIUM BLD-SCNC: 146 MMOL/L (ref 132–146)
SODIUM SERPL-SCNC: 139 MMOL/L (ref 134–144)
SODIUM SERPL-SCNC: 141 MMOL/L (ref 134–144)
SODIUM SERPL-SCNC: 143 MMOL/L (ref 134–144)
SODIUM SERPL-SCNC: 144 MMOL/L (ref 134–144)
STOMATOCYTES BLD QL SMEAR: ABNORMAL
STRESS TARGET HR: 128 BPM
T&S EXPIRATION DATE: NORMAL
T&S EXPIRATION DATE: NORMAL
TIBC SERPL-MCNC: 291 MCG/DL (ref 250–450)
TSH SERPL DL<=0.05 MIU/L-ACNC: 1.93 MIU/ML (ref 0.35–5.35)
TSH SERPL DL<=0.05 MIU/L-ACNC: 3.07 MIU/ML (ref 0.35–5.35)
UNIT  ABO: NORMAL
UNIT  RH: NORMAL
URATE SERPL-MCNC: 0.3 MG/DL (ref 3.7–9.2)
URATE SERPL-MCNC: 0.5 MG/DL (ref 3.7–9.2)
URATE SERPL-MCNC: 0.7 MG/DL (ref 3.7–9.2)
URATE SERPL-MCNC: 1 MG/DL (ref 3.7–9.2)
URATE SERPL-MCNC: 1.2 MG/DL (ref 3.7–9.2)
URATE SERPL-MCNC: 1.6 MG/DL (ref 3.7–9.2)
URATE SERPL-MCNC: 22.7 MG/DL (ref 3.7–9.2)
VARIANT LYMPHS NFR BLD MANUAL: 1 % (ref 0–5)
VIT B12 BLD-MCNC: >2000 PG/ML (ref 211–911)
WBC # BLD AUTO: 2.4 X10E3/UL (ref 3.4–10.8)
WBC # BLD AUTO: 3.1 X10E3/UL (ref 3.4–10.8)
WBC # BLD AUTO: 3.4 X10E3/UL (ref 3.4–10.8)
WBC # BLD AUTO: 3.7 X10E3/UL (ref 3.4–10.8)
WBC MORPH BLD: NORMAL
WBC NRBC COR # BLD: 11.6 10*3/MM3 (ref 3.5–10.8)
WBC NRBC COR # BLD: 12.36 10*3/MM3 (ref 3.5–10.8)
WBC NRBC COR # BLD: 14.07 10*3/MM3 (ref 3.5–10.8)
WBC NRBC COR # BLD: 19.02 10*3/MM3 (ref 3.5–10.8)
WBC NRBC COR # BLD: 24.13 10*3/MM3 (ref 3.5–10.8)
WBC NRBC COR # BLD: 3.3 10*3/MM3 (ref 3.5–10.8)
WBC NRBC COR # BLD: 3.9 10*3/MM3 (ref 3.5–10.8)
WBC NRBC COR # BLD: 32.12 10*3/MM3 (ref 3.5–10.8)
WBC NRBC COR # BLD: 32.65 10*3/MM3 (ref 3.5–10.8)
WBC NRBC COR # BLD: 4.2 10*3/MM3 (ref 3.5–10.8)
WBC NRBC COR # BLD: 4.5 10*3/MM3 (ref 3.5–10.8)
WBC NRBC COR # BLD: 4.5 10*3/MM3 (ref 3.5–10.8)
WBC NRBC COR # BLD: 4.8 10*3/MM3 (ref 3.5–10.8)
WBC NRBC COR # BLD: 4.9 10*3/MM3 (ref 3.5–10.8)
WBC NRBC COR # BLD: 5.1 10*3/MM3 (ref 3.5–10.8)
WBC NRBC COR # BLD: 5.23 10*3/MM3 (ref 3.5–10.8)
WBC NRBC COR # BLD: 5.4 10*3/MM3 (ref 3.5–10.8)
WBC NRBC COR # BLD: 5.49 10*3/MM3 (ref 3.5–10.8)
WBC NRBC COR # BLD: 5.5 10*3/MM3 (ref 3.5–10.8)
WBC NRBC COR # BLD: 6.5 10*3/MM3 (ref 3.5–10.8)
WBC NRBC COR # BLD: 6.6 10*3/MM3 (ref 3.5–10.8)
WBC NRBC COR # BLD: 6.7 10*3/MM3 (ref 3.5–10.8)
WBC NRBC COR # BLD: 6.89 10*3/MM3 (ref 3.5–10.8)
WBC NRBC COR # BLD: 7.4 10*3/MM3 (ref 3.5–10.8)
WBC NRBC COR # BLD: 8 10*3/MM3 (ref 3.5–10.8)

## 2018-01-01 PROCEDURE — 96367 TX/PROPH/DG ADDL SEQ IV INF: CPT

## 2018-01-01 PROCEDURE — 94799 UNLISTED PULMONARY SVC/PX: CPT

## 2018-01-01 PROCEDURE — 83615 LACTATE (LD) (LDH) ENZYME: CPT | Performed by: INTERNAL MEDICINE

## 2018-01-01 PROCEDURE — 84100 ASSAY OF PHOSPHORUS: CPT | Performed by: NURSE PRACTITIONER

## 2018-01-01 PROCEDURE — 82565 ASSAY OF CREATININE: CPT

## 2018-01-01 PROCEDURE — 25010000002 DEXAMETHASONE PER 1 MG: Performed by: NURSE PRACTITIONER

## 2018-01-01 PROCEDURE — 83735 ASSAY OF MAGNESIUM: CPT

## 2018-01-01 PROCEDURE — 85025 COMPLETE CBC W/AUTO DIFF WBC: CPT

## 2018-01-01 PROCEDURE — 85025 COMPLETE CBC W/AUTO DIFF WBC: CPT | Performed by: INTERNAL MEDICINE

## 2018-01-01 PROCEDURE — 80053 COMPREHEN METABOLIC PANEL: CPT | Performed by: INTERNAL MEDICINE

## 2018-01-01 PROCEDURE — 25010000002 CARFILZOMIB 60 MG RECONSTITUTED SOLUTION 60 MG VIAL: Performed by: NURSE PRACTITIONER

## 2018-01-01 PROCEDURE — 96361 HYDRATE IV INFUSION ADD-ON: CPT

## 2018-01-01 PROCEDURE — 99232 SBSQ HOSP IP/OBS MODERATE 35: CPT | Performed by: INTERNAL MEDICINE

## 2018-01-01 PROCEDURE — 85045 AUTOMATED RETICULOCYTE COUNT: CPT | Performed by: NURSE PRACTITIONER

## 2018-01-01 PROCEDURE — 85610 PROTHROMBIN TIME: CPT

## 2018-01-01 PROCEDURE — 83735 ASSAY OF MAGNESIUM: CPT | Performed by: NURSE PRACTITIONER

## 2018-01-01 PROCEDURE — 25010000002 CARFILZOMIB 60 MG RECONSTITUTED SOLUTION 60 MG VIAL: Performed by: INTERNAL MEDICINE

## 2018-01-01 PROCEDURE — 25010000002 DEXAMETHASONE PER 1 MG: Performed by: INTERNAL MEDICINE

## 2018-01-01 PROCEDURE — 80053 COMPREHEN METABOLIC PANEL: CPT

## 2018-01-01 PROCEDURE — 96365 THER/PROPH/DIAG IV INF INIT: CPT

## 2018-01-01 PROCEDURE — 83883 ASSAY NEPHELOMETRY NOT SPEC: CPT

## 2018-01-01 PROCEDURE — 96375 TX/PRO/DX INJ NEW DRUG ADDON: CPT

## 2018-01-01 PROCEDURE — 85027 COMPLETE CBC AUTOMATED: CPT | Performed by: NURSE PRACTITIONER

## 2018-01-01 PROCEDURE — 93306 TTE W/DOPPLER COMPLETE: CPT | Performed by: INTERNAL MEDICINE

## 2018-01-01 PROCEDURE — 83880 ASSAY OF NATRIURETIC PEPTIDE: CPT | Performed by: NURSE PRACTITIONER

## 2018-01-01 PROCEDURE — 92610 EVALUATE SWALLOWING FUNCTION: CPT

## 2018-01-01 PROCEDURE — 83540 ASSAY OF IRON: CPT | Performed by: NURSE PRACTITIONER

## 2018-01-01 PROCEDURE — 84100 ASSAY OF PHOSPHORUS: CPT | Performed by: INTERNAL MEDICINE

## 2018-01-01 PROCEDURE — 99214 OFFICE O/P EST MOD 30 MIN: CPT | Performed by: INTERNAL MEDICINE

## 2018-01-01 PROCEDURE — 25010000002 LORAZEPAM PER 2 MG: Performed by: NURSE PRACTITIONER

## 2018-01-01 PROCEDURE — 84134 ASSAY OF PREALBUMIN: CPT | Performed by: NURSE PRACTITIONER

## 2018-01-01 PROCEDURE — 82962 GLUCOSE BLOOD TEST: CPT

## 2018-01-01 PROCEDURE — 36430 TRANSFUSION BLD/BLD COMPNT: CPT

## 2018-01-01 PROCEDURE — 93010 ELECTROCARDIOGRAM REPORT: CPT | Performed by: INTERNAL MEDICINE

## 2018-01-01 PROCEDURE — 83735 ASSAY OF MAGNESIUM: CPT | Performed by: INTERNAL MEDICINE

## 2018-01-01 PROCEDURE — 99214 OFFICE O/P EST MOD 30 MIN: CPT | Performed by: PSYCHIATRY & NEUROLOGY

## 2018-01-01 PROCEDURE — 82550 ASSAY OF CK (CPK): CPT | Performed by: NURSE PRACTITIONER

## 2018-01-01 PROCEDURE — 83036 HEMOGLOBIN GLYCOSYLATED A1C: CPT | Performed by: NURSE PRACTITIONER

## 2018-01-01 PROCEDURE — 84550 ASSAY OF BLOOD/URIC ACID: CPT | Performed by: INTERNAL MEDICINE

## 2018-01-01 PROCEDURE — 25010000002 HEPARIN (PORCINE) PER 1000 UNITS: Performed by: SURGERY

## 2018-01-01 PROCEDURE — 25010000002 LORAZEPAM PER 2 MG: Performed by: INTERNAL MEDICINE

## 2018-01-01 PROCEDURE — 99223 1ST HOSP IP/OBS HIGH 75: CPT | Performed by: INTERNAL MEDICINE

## 2018-01-01 PROCEDURE — 5A1D70Z PERFORMANCE OF URINARY FILTRATION, INTERMITTENT, LESS THAN 6 HOURS PER DAY: ICD-10-PCS | Performed by: INTERNAL MEDICINE

## 2018-01-01 PROCEDURE — 85007 BL SMEAR W/DIFF WBC COUNT: CPT | Performed by: INTERNAL MEDICINE

## 2018-01-01 PROCEDURE — 96409 CHEMO IV PUSH SNGL DRUG: CPT

## 2018-01-01 PROCEDURE — 77412 RADIATION TX DELIVERY LVL 3: CPT | Performed by: RADIOLOGY

## 2018-01-01 PROCEDURE — 80053 COMPREHEN METABOLIC PANEL: CPT | Performed by: NURSE PRACTITIONER

## 2018-01-01 PROCEDURE — G0378 HOSPITAL OBSERVATION PER HR: HCPCS

## 2018-01-01 PROCEDURE — 96413 CHEMO IV INFUSION 1 HR: CPT

## 2018-01-01 PROCEDURE — 99215 OFFICE O/P EST HI 40 MIN: CPT | Performed by: INTERNAL MEDICINE

## 2018-01-01 PROCEDURE — 99291 CRITICAL CARE FIRST HOUR: CPT | Performed by: INTERNAL MEDICINE

## 2018-01-01 PROCEDURE — 96374 THER/PROPH/DIAG INJ IV PUSH: CPT

## 2018-01-01 PROCEDURE — DP002ZZ BEAM RADIATION OF SKULL USING PHOTONS >10 MEV: ICD-10-PCS | Performed by: RADIOLOGY

## 2018-01-01 PROCEDURE — 36600 WITHDRAWAL OF ARTERIAL BLOOD: CPT | Performed by: INTERNAL MEDICINE

## 2018-01-01 PROCEDURE — 93306 TTE W/DOPPLER COMPLETE: CPT

## 2018-01-01 PROCEDURE — 99215 OFFICE O/P EST HI 40 MIN: CPT | Performed by: NURSE PRACTITIONER

## 2018-01-01 PROCEDURE — P9017 PLASMA 1 DONOR FRZ W/IN 8 HR: HCPCS

## 2018-01-01 PROCEDURE — 85045 AUTOMATED RETICULOCYTE COUNT: CPT | Performed by: INTERNAL MEDICINE

## 2018-01-01 PROCEDURE — 71045 X-RAY EXAM CHEST 1 VIEW: CPT

## 2018-01-01 PROCEDURE — 80048 BASIC METABOLIC PNL TOTAL CA: CPT | Performed by: INTERNAL MEDICINE

## 2018-01-01 PROCEDURE — 82805 BLOOD GASES W/O2 SATURATION: CPT | Performed by: INTERNAL MEDICINE

## 2018-01-01 PROCEDURE — 25010000002 CEFTRIAXONE PER 250 MG: Performed by: INTERNAL MEDICINE

## 2018-01-01 PROCEDURE — 84100 ASSAY OF PHOSPHORUS: CPT

## 2018-01-01 PROCEDURE — 77417 THER RADIOLOGY PORT IMAGE(S): CPT | Performed by: RADIOLOGY

## 2018-01-01 PROCEDURE — 80069 RENAL FUNCTION PANEL: CPT | Performed by: INTERNAL MEDICINE

## 2018-01-01 PROCEDURE — 76000 FLUOROSCOPY <1 HR PHYS/QHP: CPT

## 2018-01-01 PROCEDURE — 96360 HYDRATION IV INFUSION INIT: CPT

## 2018-01-01 PROCEDURE — 99233 SBSQ HOSP IP/OBS HIGH 50: CPT | Performed by: INTERNAL MEDICINE

## 2018-01-01 PROCEDURE — 36415 COLL VENOUS BLD VENIPUNCTURE: CPT

## 2018-01-01 PROCEDURE — 25010000002 ZOLEDRONIC ACID PER 1 MG: Performed by: INTERNAL MEDICINE

## 2018-01-01 PROCEDURE — 86900 BLOOD TYPING SEROLOGIC ABO: CPT | Performed by: INTERNAL MEDICINE

## 2018-01-01 PROCEDURE — 85060 BLOOD SMEAR INTERPRETATION: CPT | Performed by: NURSE PRACTITIONER

## 2018-01-01 PROCEDURE — 25010000002 LEVETIRACETAM IN NACL 0.82% 500 MG/100ML SOLUTION: Performed by: INTERNAL MEDICINE

## 2018-01-01 PROCEDURE — 71046 X-RAY EXAM CHEST 2 VIEWS: CPT

## 2018-01-01 PROCEDURE — 77387 GUIDANCE FOR RADJ TX DLVR: CPT | Performed by: RADIOLOGY

## 2018-01-01 PROCEDURE — 25010000002 HALOPERIDOL LACTATE PER 5 MG: Performed by: NURSE PRACTITIONER

## 2018-01-01 PROCEDURE — 25010000002 HYDROMORPHONE PER 4 MG: Performed by: NURSE PRACTITIONER

## 2018-01-01 PROCEDURE — 85379 FIBRIN DEGRADATION QUANT: CPT | Performed by: INTERNAL MEDICINE

## 2018-01-01 PROCEDURE — 86901 BLOOD TYPING SEROLOGIC RH(D): CPT

## 2018-01-01 PROCEDURE — 93005 ELECTROCARDIOGRAM TRACING: CPT | Performed by: NURSE PRACTITIONER

## 2018-01-01 PROCEDURE — 25010000002 VITAMIN K1 PER 1 MG: Performed by: INTERNAL MEDICINE

## 2018-01-01 PROCEDURE — 85610 PROTHROMBIN TIME: CPT | Performed by: NURSE PRACTITIONER

## 2018-01-01 PROCEDURE — 02HV33Z INSERTION OF INFUSION DEVICE INTO SUPERIOR VENA CAVA, PERCUTANEOUS APPROACH: ICD-10-PCS | Performed by: SURGERY

## 2018-01-01 PROCEDURE — 83883 ASSAY NEPHELOMETRY NOT SPEC: CPT | Performed by: INTERNAL MEDICINE

## 2018-01-01 PROCEDURE — 80048 BASIC METABOLIC PNL TOTAL CA: CPT | Performed by: NURSE PRACTITIONER

## 2018-01-01 PROCEDURE — 82140 ASSAY OF AMMONIA: CPT | Performed by: INTERNAL MEDICINE

## 2018-01-01 PROCEDURE — 70551 MRI BRAIN STEM W/O DYE: CPT

## 2018-01-01 PROCEDURE — 25010000002 ALBUMIN HUMAN 25% PER 50 ML: Performed by: INTERNAL MEDICINE

## 2018-01-01 PROCEDURE — 83605 ASSAY OF LACTIC ACID: CPT | Performed by: NURSE PRACTITIONER

## 2018-01-01 PROCEDURE — 86901 BLOOD TYPING SEROLOGIC RH(D): CPT | Performed by: INTERNAL MEDICINE

## 2018-01-01 PROCEDURE — 85025 COMPLETE CBC W/AUTO DIFF WBC: CPT | Performed by: NURSE PRACTITIONER

## 2018-01-01 PROCEDURE — 85362 FIBRIN DEGRADATION PRODUCTS: CPT | Performed by: INTERNAL MEDICINE

## 2018-01-01 PROCEDURE — 72141 MRI NECK SPINE W/O DYE: CPT

## 2018-01-01 PROCEDURE — 80074 ACUTE HEPATITIS PANEL: CPT | Performed by: INTERNAL MEDICINE

## 2018-01-01 PROCEDURE — 82728 ASSAY OF FERRITIN: CPT | Performed by: NURSE PRACTITIONER

## 2018-01-01 PROCEDURE — 63710000001 DEXAMETHASONE PER 0.25 MG: Performed by: INTERNAL MEDICINE

## 2018-01-01 PROCEDURE — 82330 ASSAY OF CALCIUM: CPT | Performed by: INTERNAL MEDICINE

## 2018-01-01 PROCEDURE — 84443 ASSAY THYROID STIM HORMONE: CPT | Performed by: NURSE PRACTITIONER

## 2018-01-01 PROCEDURE — 97110 THERAPEUTIC EXERCISES: CPT

## 2018-01-01 PROCEDURE — 76775 US EXAM ABDO BACK WALL LIM: CPT

## 2018-01-01 PROCEDURE — 86850 RBC ANTIBODY SCREEN: CPT | Performed by: INTERNAL MEDICINE

## 2018-01-01 PROCEDURE — 99214 OFFICE O/P EST MOD 30 MIN: CPT | Performed by: NURSE PRACTITIONER

## 2018-01-01 PROCEDURE — 83874 ASSAY OF MYOGLOBIN: CPT | Performed by: NURSE PRACTITIONER

## 2018-01-01 PROCEDURE — 0 IOPAMIDOL PER 1 ML: Performed by: NURSE PRACTITIONER

## 2018-01-01 PROCEDURE — 77336 RADIATION PHYSICS CONSULT: CPT | Performed by: RADIOLOGY

## 2018-01-01 PROCEDURE — 83883 ASSAY NEPHELOMETRY NOT SPEC: CPT | Performed by: NURSE PRACTITIONER

## 2018-01-01 PROCEDURE — 77334 RADIATION TREATMENT AID(S): CPT | Performed by: RADIOLOGY

## 2018-01-01 PROCEDURE — 86900 BLOOD TYPING SEROLOGIC ABO: CPT

## 2018-01-01 PROCEDURE — 25010000002 PROPOFOL 10 MG/ML EMULSION: Performed by: NURSE ANESTHETIST, CERTIFIED REGISTERED

## 2018-01-01 PROCEDURE — 85610 PROTHROMBIN TIME: CPT | Performed by: INTERNAL MEDICINE

## 2018-01-01 PROCEDURE — 85730 THROMBOPLASTIN TIME PARTIAL: CPT | Performed by: INTERNAL MEDICINE

## 2018-01-01 PROCEDURE — 25010000002 RASBURICASE PER 0.5 MG: Performed by: INTERNAL MEDICINE

## 2018-01-01 PROCEDURE — C1750 CATH, HEMODIALYSIS,LONG-TERM: HCPCS | Performed by: SURGERY

## 2018-01-01 PROCEDURE — 25010000002 VITAMIN K1 PER 1 MG: Performed by: NURSE PRACTITIONER

## 2018-01-01 PROCEDURE — 85730 THROMBOPLASTIN TIME PARTIAL: CPT | Performed by: NURSE PRACTITIONER

## 2018-01-01 PROCEDURE — 77307 TELETHX ISODOSE PLAN CPLX: CPT | Performed by: RADIOLOGY

## 2018-01-01 PROCEDURE — 25010000002 ONDANSETRON PER 1 MG: Performed by: NURSE PRACTITIONER

## 2018-01-01 PROCEDURE — 99222 1ST HOSP IP/OBS MODERATE 55: CPT | Performed by: INTERNAL MEDICINE

## 2018-01-01 PROCEDURE — 87205 SMEAR GRAM STAIN: CPT | Performed by: INTERNAL MEDICINE

## 2018-01-01 PROCEDURE — 86850 RBC ANTIBODY SCREEN: CPT

## 2018-01-01 PROCEDURE — 82550 ASSAY OF CK (CPK): CPT | Performed by: INTERNAL MEDICINE

## 2018-01-01 PROCEDURE — 94760 N-INVAS EAR/PLS OXIMETRY 1: CPT

## 2018-01-01 PROCEDURE — 71260 CT THORAX DX C+: CPT

## 2018-01-01 PROCEDURE — 97163 PT EVAL HIGH COMPLEX 45 MIN: CPT

## 2018-01-01 PROCEDURE — 92526 ORAL FUNCTION THERAPY: CPT

## 2018-01-01 PROCEDURE — P9035 PLATELET PHERES LEUKOREDUCED: HCPCS

## 2018-01-01 PROCEDURE — 25010000002 SULFUR HEXAFLUORIDE MICROSPH 60.7-25 MG RECONSTITUTED SUSPENSION: Performed by: INTERNAL MEDICINE

## 2018-01-01 PROCEDURE — 25010000002 ZOLEDRONIC ACID PER 1 MG: Performed by: NURSE PRACTITIONER

## 2018-01-01 PROCEDURE — 85007 BL SMEAR W/DIFF WBC COUNT: CPT | Performed by: NURSE PRACTITIONER

## 2018-01-01 PROCEDURE — 84145 PROCALCITONIN (PCT): CPT | Performed by: NURSE PRACTITIONER

## 2018-01-01 PROCEDURE — 25010000002 FENTANYL CITRATE (PF) 100 MCG/2ML SOLUTION: Performed by: NURSE ANESTHETIST, CERTIFIED REGISTERED

## 2018-01-01 PROCEDURE — B548ZZA ULTRASONOGRAPHY OF SUPERIOR VENA CAVA, GUIDANCE: ICD-10-PCS | Performed by: SURGERY

## 2018-01-01 PROCEDURE — 84443 ASSAY THYROID STIM HORMONE: CPT | Performed by: INTERNAL MEDICINE

## 2018-01-01 PROCEDURE — 85027 COMPLETE CBC AUTOMATED: CPT | Performed by: INTERNAL MEDICINE

## 2018-01-01 PROCEDURE — P9046 ALBUMIN (HUMAN), 25%, 20 ML: HCPCS | Performed by: INTERNAL MEDICINE

## 2018-01-01 PROCEDURE — 77290 THER RAD SIMULAJ FIELD CPLX: CPT | Performed by: RADIOLOGY

## 2018-01-01 PROCEDURE — 25010000002 HEPARIN (PORCINE) PER 1000 UNITS: Performed by: INTERNAL MEDICINE

## 2018-01-01 PROCEDURE — 80183 DRUG SCRN QUANT OXCARBAZEPIN: CPT | Performed by: NURSE PRACTITIONER

## 2018-01-01 PROCEDURE — 85384 FIBRINOGEN ACTIVITY: CPT | Performed by: INTERNAL MEDICINE

## 2018-01-01 PROCEDURE — 86927 PLASMA FRESH FROZEN: CPT

## 2018-01-01 PROCEDURE — 71110 X-RAY EXAM RIBS BIL 3 VIEWS: CPT

## 2018-01-01 PROCEDURE — 74176 CT ABD & PELVIS W/O CONTRAST: CPT

## 2018-01-01 PROCEDURE — 85730 THROMBOPLASTIN TIME PARTIAL: CPT | Performed by: SURGERY

## 2018-01-01 PROCEDURE — 82607 VITAMIN B-12: CPT | Performed by: NURSE PRACTITIONER

## 2018-01-01 PROCEDURE — 83550 IRON BINDING TEST: CPT | Performed by: NURSE PRACTITIONER

## 2018-01-01 PROCEDURE — 82746 ASSAY OF FOLIC ACID SERUM: CPT | Performed by: NURSE PRACTITIONER

## 2018-01-01 RX ORDER — LENALIDOMIDE 25 MG/1
25 CAPSULE ORAL DAILY
Qty: 21 CAPSULE | Refills: 0 | Status: SHIPPED | OUTPATIENT
Start: 2018-01-01 | End: 2018-01-01

## 2018-01-01 RX ORDER — SODIUM CHLORIDE 0.9 % (FLUSH) 0.9 %
1-10 SYRINGE (ML) INJECTION AS NEEDED
Status: DISCONTINUED | OUTPATIENT
Start: 2018-01-01 | End: 2018-01-01 | Stop reason: HOSPADM

## 2018-01-01 RX ORDER — DIPHENHYDRAMINE HYDROCHLORIDE 50 MG/ML
50 INJECTION INTRAMUSCULAR; INTRAVENOUS AS NEEDED
OUTPATIENT
Start: 2018-08-30

## 2018-01-01 RX ORDER — SIMETHICONE 20 MG/.3ML
40 EMULSION ORAL 4 TIMES DAILY PRN
Status: DISCONTINUED | OUTPATIENT
Start: 2018-01-01 | End: 2018-01-01

## 2018-01-01 RX ORDER — ACETAMINOPHEN 160 MG/5ML
650 SOLUTION ORAL EVERY 4 HOURS PRN
Status: DISCONTINUED | OUTPATIENT
Start: 2018-01-01 | End: 2018-01-01 | Stop reason: HOSPADM

## 2018-01-01 RX ORDER — PANTOPRAZOLE SODIUM 40 MG/10ML
40 INJECTION, POWDER, LYOPHILIZED, FOR SOLUTION INTRAVENOUS
Status: DISCONTINUED | OUTPATIENT
Start: 2018-01-01 | End: 2018-01-01

## 2018-01-01 RX ORDER — SODIUM CHLORIDE 9 MG/ML
250 INJECTION, SOLUTION INTRAVENOUS ONCE
Status: CANCELLED | OUTPATIENT
Start: 2018-01-01

## 2018-01-01 RX ORDER — MORPHINE SULFATE 2 MG/ML
2 INJECTION, SOLUTION INTRAMUSCULAR; INTRAVENOUS ONCE
Status: DISCONTINUED | OUTPATIENT
Start: 2018-01-01 | End: 2018-01-01

## 2018-01-01 RX ORDER — LORAZEPAM 2 MG/ML
1 INJECTION INTRAMUSCULAR EVERY 6 HOURS
Status: DISCONTINUED | OUTPATIENT
Start: 2018-01-01 | End: 2018-01-01 | Stop reason: HOSPADM

## 2018-01-01 RX ORDER — LORAZEPAM 2 MG/ML
1 INJECTION INTRAMUSCULAR EVERY 4 HOURS PRN
Status: DISCONTINUED | OUTPATIENT
Start: 2018-01-01 | End: 2018-01-01 | Stop reason: HOSPADM

## 2018-01-01 RX ORDER — AZELASTINE 1 MG/ML
2 SPRAY, METERED NASAL 2 TIMES DAILY
Status: DISCONTINUED | OUTPATIENT
Start: 2018-01-01 | End: 2018-01-01

## 2018-01-01 RX ORDER — SODIUM CHLORIDE 9 MG/ML
250 INJECTION, SOLUTION INTRAVENOUS ONCE
Status: COMPLETED | OUTPATIENT
Start: 2018-01-01 | End: 2018-01-01

## 2018-01-01 RX ORDER — GLYCOPYRROLATE 0.2 MG/ML
0.2 INJECTION INTRAMUSCULAR; INTRAVENOUS
Status: DISCONTINUED | OUTPATIENT
Start: 2018-01-01 | End: 2018-01-01 | Stop reason: HOSPADM

## 2018-01-01 RX ORDER — HALOPERIDOL 5 MG/ML
2 INJECTION INTRAMUSCULAR
Status: DISCONTINUED | OUTPATIENT
Start: 2018-01-01 | End: 2018-01-01 | Stop reason: HOSPADM

## 2018-01-01 RX ORDER — DEXAMETHASONE 4 MG/1
TABLET ORAL
COMMUNITY
Start: 2013-12-03 | End: 2018-01-01 | Stop reason: SDUPTHER

## 2018-01-01 RX ORDER — BUPIVACAINE HYDROCHLORIDE AND EPINEPHRINE 5; 5 MG/ML; UG/ML
INJECTION, SOLUTION EPIDURAL; INTRACAUDAL; PERINEURAL AS NEEDED
Status: DISCONTINUED | OUTPATIENT
Start: 2018-01-01 | End: 2018-01-01 | Stop reason: HOSPADM

## 2018-01-01 RX ORDER — ACYCLOVIR 200 MG/1
400 CAPSULE ORAL 2 TIMES DAILY
Status: DISCONTINUED | OUTPATIENT
Start: 2018-01-01 | End: 2018-01-01 | Stop reason: SDUPTHER

## 2018-01-01 RX ORDER — SODIUM CHLORIDE 9 MG/ML
250 INJECTION, SOLUTION INTRAVENOUS ONCE
Status: DISCONTINUED | OUTPATIENT
Start: 2018-01-01 | End: 2018-01-01 | Stop reason: HOSPADM

## 2018-01-01 RX ORDER — LENALIDOMIDE 25 MG/1
25 CAPSULE ORAL DAILY
Qty: 21 CAPSULE | Refills: 0 | Status: SHIPPED | OUTPATIENT
Start: 2018-01-01 | End: 2018-01-01 | Stop reason: SDUPTHER

## 2018-01-01 RX ORDER — SODIUM CHLORIDE 9 MG/ML
250 INJECTION, SOLUTION INTRAVENOUS ONCE
OUTPATIENT
Start: 2018-08-23

## 2018-01-01 RX ORDER — CETIRIZINE HYDROCHLORIDE 10 MG/1
10 TABLET ORAL NIGHTLY
Status: DISCONTINUED | OUTPATIENT
Start: 2018-01-01 | End: 2018-01-01

## 2018-01-01 RX ORDER — HEPARIN SODIUM 1000 [USP'U]/ML
2000 INJECTION, SOLUTION INTRAVENOUS; SUBCUTANEOUS AS NEEDED
Status: DISCONTINUED | OUTPATIENT
Start: 2018-01-01 | End: 2018-01-01

## 2018-01-01 RX ORDER — LENALIDOMIDE 25 MG/1
25 CAPSULE ORAL DAILY
Qty: 14 CAPSULE | Refills: 0 | Status: SHIPPED | OUTPATIENT
Start: 2018-01-01 | End: 2018-01-01 | Stop reason: SDUPTHER

## 2018-01-01 RX ORDER — LIDOCAINE 50 MG/G
3 PATCH TOPICAL DAILY PRN
Status: DISCONTINUED | OUTPATIENT
Start: 2018-01-01 | End: 2018-01-01 | Stop reason: HOSPADM

## 2018-01-01 RX ORDER — LIDOCAINE HYDROCHLORIDE 10 MG/ML
0.5 INJECTION, SOLUTION EPIDURAL; INFILTRATION; INTRACAUDAL; PERINEURAL ONCE AS NEEDED
Status: DISCONTINUED | OUTPATIENT
Start: 2018-01-01 | End: 2018-01-01

## 2018-01-01 RX ORDER — MEPERIDINE HYDROCHLORIDE 50 MG/ML
25 INJECTION INTRAMUSCULAR; INTRAVENOUS; SUBCUTANEOUS
OUTPATIENT
Start: 2018-08-30 | End: 2018-08-28

## 2018-01-01 RX ORDER — LEVETIRACETAM 1000 MG/1
TABLET ORAL
COMMUNITY
Start: 2016-03-14 | End: 2018-01-01 | Stop reason: SDUPTHER

## 2018-01-01 RX ORDER — LORAZEPAM 1 MG/1
2 TABLET ORAL EVERY 6 HOURS PRN
Status: DISCONTINUED | OUTPATIENT
Start: 2018-01-01 | End: 2018-01-01 | Stop reason: SDUPTHER

## 2018-01-01 RX ORDER — PROPOFOL 10 MG/ML
VIAL (ML) INTRAVENOUS CONTINUOUS PRN
Status: DISCONTINUED | OUTPATIENT
Start: 2018-01-01 | End: 2018-01-01 | Stop reason: SURG

## 2018-01-01 RX ORDER — FENTANYL CITRATE 50 UG/ML
INJECTION, SOLUTION INTRAMUSCULAR; INTRAVENOUS AS NEEDED
Status: DISCONTINUED | OUTPATIENT
Start: 2018-01-01 | End: 2018-01-01 | Stop reason: SURG

## 2018-01-01 RX ORDER — LEVETIRACETAM 5 MG/ML
500 INJECTION INTRAVASCULAR EVERY 12 HOURS SCHEDULED
Status: DISCONTINUED | OUTPATIENT
Start: 2018-01-01 | End: 2018-01-01

## 2018-01-01 RX ORDER — MEPERIDINE HYDROCHLORIDE 50 MG/ML
25 INJECTION INTRAMUSCULAR; INTRAVENOUS; SUBCUTANEOUS
OUTPATIENT
Start: 2018-01-01 | End: 2018-01-01

## 2018-01-01 RX ORDER — LORAZEPAM 2 MG/ML
0.5 INJECTION INTRAMUSCULAR EVERY 4 HOURS PRN
Status: DISCONTINUED | OUTPATIENT
Start: 2018-01-01 | End: 2018-01-01

## 2018-01-01 RX ORDER — ACETAMINOPHEN 500 MG
1000 TABLET ORAL ONCE
OUTPATIENT
Start: 2018-01-01

## 2018-01-01 RX ORDER — PANTOPRAZOLE SODIUM 40 MG/1
40 TABLET, DELAYED RELEASE ORAL
Status: DISCONTINUED | OUTPATIENT
Start: 2018-01-01 | End: 2018-01-01 | Stop reason: SDUPTHER

## 2018-01-01 RX ORDER — LORAZEPAM 2 MG/ML
2 CONCENTRATE ORAL EVERY 6 HOURS PRN
Status: DISCONTINUED | OUTPATIENT
Start: 2018-01-01 | End: 2018-01-01 | Stop reason: HOSPADM

## 2018-01-01 RX ORDER — HALOPERIDOL 5 MG/ML
0.5 INJECTION INTRAMUSCULAR EVERY 4 HOURS PRN
Status: DISCONTINUED | OUTPATIENT
Start: 2018-01-01 | End: 2018-01-01

## 2018-01-01 RX ORDER — OXCARBAZEPINE 300 MG/1
TABLET, FILM COATED ORAL
Qty: 120 TABLET | Refills: 2 | Status: SHIPPED | OUTPATIENT
Start: 2018-01-01

## 2018-01-01 RX ORDER — SODIUM BICARBONATE 650 MG/1
1300 TABLET ORAL 3 TIMES DAILY
Status: DISCONTINUED | OUTPATIENT
Start: 2018-01-01 | End: 2018-01-01

## 2018-01-01 RX ORDER — ALBUMIN (HUMAN) 12.5 G/50ML
12.5 SOLUTION INTRAVENOUS AS NEEDED
Status: ACTIVE | OUTPATIENT
Start: 2018-01-01 | End: 2018-01-01

## 2018-01-01 RX ORDER — PHYTONADIONE 10 MG/ML
10 INJECTION, EMULSION INTRAMUSCULAR; INTRAVENOUS; SUBCUTANEOUS ONCE
Status: COMPLETED | OUTPATIENT
Start: 2018-01-01 | End: 2018-01-01

## 2018-01-01 RX ORDER — HALOPERIDOL 5 MG/ML
2 INJECTION INTRAMUSCULAR EVERY 4 HOURS PRN
Status: DISCONTINUED | OUTPATIENT
Start: 2018-01-01 | End: 2018-01-01

## 2018-01-01 RX ORDER — SODIUM CHLORIDE 9 MG/ML
9 INJECTION, SOLUTION INTRAVENOUS CONTINUOUS
Status: DISCONTINUED | OUTPATIENT
Start: 2018-01-01 | End: 2018-01-01 | Stop reason: HOSPADM

## 2018-01-01 RX ORDER — METHYLPREDNISOLONE SODIUM SUCCINATE 125 MG/2ML
100 INJECTION, POWDER, LYOPHILIZED, FOR SOLUTION INTRAMUSCULAR; INTRAVENOUS ONCE
OUTPATIENT
Start: 2018-01-01

## 2018-01-01 RX ORDER — BACLOFEN 10 MG/1
10 TABLET ORAL DAILY PRN
Status: DISCONTINUED | OUTPATIENT
Start: 2018-01-01 | End: 2018-01-01 | Stop reason: HOSPADM

## 2018-01-01 RX ORDER — METHYLPREDNISOLONE SODIUM SUCCINATE 125 MG/2ML
60 INJECTION, POWDER, LYOPHILIZED, FOR SOLUTION INTRAMUSCULAR; INTRAVENOUS ONCE
OUTPATIENT
Start: 2018-08-23

## 2018-01-01 RX ORDER — SODIUM CHLORIDE 9 MG/ML
250 INJECTION, SOLUTION INTRAVENOUS ONCE
OUTPATIENT
Start: 2018-09-18

## 2018-01-01 RX ORDER — DIPHENHYDRAMINE HYDROCHLORIDE 50 MG/ML
50 INJECTION INTRAMUSCULAR; INTRAVENOUS AS NEEDED
OUTPATIENT
Start: 2018-08-23

## 2018-01-01 RX ORDER — GABAPENTIN 300 MG/1
CAPSULE ORAL
Qty: 90 CAPSULE | Refills: 2 | OUTPATIENT
Start: 2018-01-01 | End: 2018-01-01 | Stop reason: SDUPTHER

## 2018-01-01 RX ORDER — ACYCLOVIR 400 MG/1
400 TABLET ORAL 2 TIMES DAILY
Qty: 60 TABLET | Refills: 11 | Status: CANCELLED | OUTPATIENT
Start: 2018-01-01

## 2018-01-01 RX ORDER — FAMOTIDINE 10 MG/ML
20 INJECTION, SOLUTION INTRAVENOUS AS NEEDED
OUTPATIENT
Start: 2018-08-23

## 2018-01-01 RX ORDER — SULFAMETHOXAZOLE AND TRIMETHOPRIM 800; 160 MG/1; MG/1
1 TABLET ORAL 3 TIMES WEEKLY
Qty: 12 TABLET | Refills: 11 | Status: CANCELLED | OUTPATIENT
Start: 2018-01-01

## 2018-01-01 RX ORDER — SODIUM CHLORIDE 9 MG/ML
250 INJECTION, SOLUTION INTRAVENOUS ONCE
OUTPATIENT
Start: 2018-01-01

## 2018-01-01 RX ORDER — BISACODYL 10 MG
10 SUPPOSITORY, RECTAL RECTAL DAILY PRN
Status: DISCONTINUED | OUTPATIENT
Start: 2018-01-01 | End: 2018-01-01 | Stop reason: HOSPADM

## 2018-01-01 RX ORDER — ALBUMIN (HUMAN) 12.5 G/50ML
12.5 SOLUTION INTRAVENOUS AS NEEDED
Status: CANCELLED | OUTPATIENT
Start: 2018-01-01 | End: 2018-01-01

## 2018-01-01 RX ORDER — ONDANSETRON HYDROCHLORIDE 8 MG/1
8 TABLET, FILM COATED ORAL 3 TIMES DAILY PRN
Qty: 30 TABLET | Refills: 5 | Status: SHIPPED | OUTPATIENT
Start: 2018-01-01

## 2018-01-01 RX ORDER — ACETAMINOPHEN 500 MG
1000 TABLET ORAL ONCE
OUTPATIENT
Start: 2018-08-30

## 2018-01-01 RX ORDER — HALOPERIDOL 5 MG/ML
5 INJECTION INTRAMUSCULAR ONCE
Status: COMPLETED | OUTPATIENT
Start: 2018-01-01 | End: 2018-01-01

## 2018-01-01 RX ORDER — DEXAMETHASONE 4 MG/1
40 TABLET ORAL
Qty: 48 TABLET | Refills: 1 | Status: SHIPPED | OUTPATIENT
Start: 2018-01-01

## 2018-01-01 RX ORDER — PANTOPRAZOLE SODIUM 40 MG/1
40 TABLET, DELAYED RELEASE ORAL
Status: DISCONTINUED | OUTPATIENT
Start: 2018-01-01 | End: 2018-01-01

## 2018-01-01 RX ORDER — LEVETIRACETAM 100 MG/ML
500 SOLUTION ORAL EVERY 12 HOURS SCHEDULED
Status: DISCONTINUED | OUTPATIENT
Start: 2018-01-01 | End: 2018-01-01

## 2018-01-01 RX ORDER — SODIUM CHLORIDE 9 MG/ML
80 INJECTION, SOLUTION INTRAVENOUS CONTINUOUS
Status: DISCONTINUED | OUTPATIENT
Start: 2018-01-01 | End: 2018-01-01

## 2018-01-01 RX ORDER — LIDOCAINE 40 MG/G
CREAM TOPICAL AS NEEDED
Status: DISCONTINUED | OUTPATIENT
Start: 2018-01-01 | End: 2018-01-01 | Stop reason: HOSPADM

## 2018-01-01 RX ORDER — DEXTROSE AND SODIUM CHLORIDE 5; .45 G/100ML; G/100ML
50 INJECTION, SOLUTION INTRAVENOUS CONTINUOUS
Status: DISCONTINUED | OUTPATIENT
Start: 2018-01-01 | End: 2018-01-01

## 2018-01-01 RX ORDER — GLYCOPYRROLATE 0.2 MG/ML
0.2 INJECTION INTRAMUSCULAR; INTRAVENOUS ONCE
Status: COMPLETED | OUTPATIENT
Start: 2018-01-01 | End: 2018-01-01

## 2018-01-01 RX ORDER — SULFAMETHOXAZOLE AND TRIMETHOPRIM 800; 160 MG/1; MG/1
TABLET ORAL
Refills: 3 | COMMUNITY
Start: 2018-01-01 | End: 2018-01-01 | Stop reason: SDUPTHER

## 2018-01-01 RX ORDER — ONDANSETRON 2 MG/ML
4 INJECTION INTRAMUSCULAR; INTRAVENOUS ONCE AS NEEDED
Status: DISCONTINUED | OUTPATIENT
Start: 2018-01-01 | End: 2018-01-01 | Stop reason: HOSPADM

## 2018-01-01 RX ORDER — MONTELUKAST SODIUM 10 MG/1
10 TABLET ORAL ONCE
OUTPATIENT
Start: 2018-01-01

## 2018-01-01 RX ORDER — GUAIFENESIN/DEXTROMETHORPHAN 100-10MG/5
5 SYRUP ORAL EVERY 4 HOURS PRN
Status: DISCONTINUED | OUTPATIENT
Start: 2018-01-01 | End: 2018-01-01

## 2018-01-01 RX ORDER — DIPHENHYDRAMINE HYDROCHLORIDE 50 MG/ML
50 INJECTION INTRAMUSCULAR; INTRAVENOUS AS NEEDED
OUTPATIENT
Start: 2018-01-01

## 2018-01-01 RX ORDER — CEFTRIAXONE SODIUM 1 G/50ML
1 INJECTION, SOLUTION INTRAVENOUS EVERY 24 HOURS
Status: DISCONTINUED | OUTPATIENT
Start: 2018-01-01 | End: 2018-01-01

## 2018-01-01 RX ORDER — DOCUSATE SODIUM 50 MG/5 ML
100 LIQUID (ML) ORAL 2 TIMES DAILY PRN
Status: DISCONTINUED | OUTPATIENT
Start: 2018-01-01 | End: 2018-01-01 | Stop reason: HOSPADM

## 2018-01-01 RX ORDER — OXCARBAZEPINE 150 MG/1
300 TABLET, FILM COATED ORAL EVERY 12 HOURS SCHEDULED
Status: DISCONTINUED | OUTPATIENT
Start: 2018-01-01 | End: 2018-01-01

## 2018-01-01 RX ORDER — FLUOXETINE 10 MG/1
10 CAPSULE ORAL DAILY
COMMUNITY

## 2018-01-01 RX ORDER — GABAPENTIN 250 MG/5ML
100 SOLUTION ORAL NIGHTLY
Status: DISCONTINUED | OUTPATIENT
Start: 2018-01-01 | End: 2018-01-01

## 2018-01-01 RX ORDER — SACCHAROMYCES BOULARDII 250 MG
250 CAPSULE ORAL 2 TIMES DAILY
Status: DISCONTINUED | OUTPATIENT
Start: 2018-01-01 | End: 2018-01-01

## 2018-01-01 RX ORDER — HYDROCODONE BITARTRATE AND ACETAMINOPHEN 5; 325 MG/1; MG/1
1 TABLET ORAL EVERY 4 HOURS PRN
Qty: 100 TABLET | Refills: 0 | Status: SHIPPED | OUTPATIENT
Start: 2018-01-01

## 2018-01-01 RX ORDER — FAMOTIDINE 10 MG/ML
20 INJECTION, SOLUTION INTRAVENOUS AS NEEDED
OUTPATIENT
Start: 2018-01-01

## 2018-01-01 RX ORDER — PROMETHAZINE HYDROCHLORIDE 25 MG/ML
12.5 INJECTION, SOLUTION INTRAMUSCULAR; INTRAVENOUS EVERY 6 HOURS PRN
Status: DISCONTINUED | OUTPATIENT
Start: 2018-01-01 | End: 2018-01-01 | Stop reason: HOSPADM

## 2018-01-01 RX ORDER — SULFAMETHOXAZOLE AND TRIMETHOPRIM 800; 160 MG/1; MG/1
TABLET ORAL
Qty: 60 TABLET | Refills: 5 | Status: SHIPPED | OUTPATIENT
Start: 2018-01-01 | End: 2018-01-01 | Stop reason: SDUPTHER

## 2018-01-01 RX ORDER — ALBUMIN (HUMAN) 12.5 G/50ML
25 SOLUTION INTRAVENOUS AS NEEDED
Status: ACTIVE | OUTPATIENT
Start: 2018-01-01 | End: 2018-01-01

## 2018-01-01 RX ORDER — SIMETHICONE 80 MG
80 TABLET,CHEWABLE ORAL 4 TIMES DAILY PRN
Status: DISCONTINUED | OUTPATIENT
Start: 2018-01-01 | End: 2018-01-01 | Stop reason: SDUPTHER

## 2018-01-01 RX ORDER — DOCUSATE SODIUM 100 MG/1
100 CAPSULE, LIQUID FILLED ORAL 2 TIMES DAILY PRN
Status: DISCONTINUED | OUTPATIENT
Start: 2018-01-01 | End: 2018-01-01 | Stop reason: SDUPTHER

## 2018-01-01 RX ORDER — DEXAMETHASONE 4 MG/1
40 TABLET ORAL
Status: DISCONTINUED | OUTPATIENT
Start: 2018-01-01 | End: 2018-01-01 | Stop reason: SDUPTHER

## 2018-01-01 RX ORDER — LEVETIRACETAM 500 MG/1
500 TABLET ORAL EVERY 12 HOURS SCHEDULED
Status: DISCONTINUED | OUTPATIENT
Start: 2018-01-01 | End: 2018-01-01

## 2018-01-01 RX ORDER — DEXAMETHASONE 4 MG/1
TABLET ORAL
Qty: 40 TABLET | Refills: 5 | Status: SHIPPED | OUTPATIENT
Start: 2018-01-01 | End: 2018-01-01 | Stop reason: SDUPTHER

## 2018-01-01 RX ORDER — ASPIRIN 81 MG/1
81 TABLET, CHEWABLE ORAL DAILY
Status: DISCONTINUED | OUTPATIENT
Start: 2018-01-01 | End: 2018-01-01

## 2018-01-01 RX ORDER — DEXAMETHASONE 4 MG/1
40 TABLET ORAL
Status: DISCONTINUED | OUTPATIENT
Start: 2018-01-01 | End: 2018-01-01

## 2018-01-01 RX ORDER — SULFAMETHOXAZOLE AND TRIMETHOPRIM 800; 160 MG/1; MG/1
1 TABLET ORAL 3 TIMES WEEKLY
Qty: 12 TABLET | Refills: 11 | Status: SHIPPED | OUTPATIENT
Start: 2018-01-01

## 2018-01-01 RX ORDER — ONDANSETRON 4 MG/1
4 TABLET, FILM COATED ORAL EVERY 6 HOURS PRN
Status: DISCONTINUED | OUTPATIENT
Start: 2018-01-01 | End: 2018-01-01 | Stop reason: HOSPADM

## 2018-01-01 RX ORDER — GABAPENTIN 300 MG/1
CAPSULE ORAL
Qty: 90 CAPSULE | Refills: 0 | OUTPATIENT
Start: 2018-01-01

## 2018-01-01 RX ORDER — CYCLOBENZAPRINE HCL 5 MG
5 TABLET ORAL AS NEEDED
COMMUNITY
End: 2018-01-01

## 2018-01-01 RX ORDER — FENTANYL CITRATE 50 UG/ML
50 INJECTION, SOLUTION INTRAMUSCULAR; INTRAVENOUS
Status: DISCONTINUED | OUTPATIENT
Start: 2018-01-01 | End: 2018-01-01 | Stop reason: HOSPADM

## 2018-01-01 RX ORDER — FLUOXETINE 10 MG/1
10 CAPSULE ORAL DAILY
Status: DISCONTINUED | OUTPATIENT
Start: 2018-01-01 | End: 2018-01-01 | Stop reason: SDUPTHER

## 2018-01-01 RX ORDER — LANOLIN ALCOHOL/MO/W.PET/CERES
500 CREAM (GRAM) TOPICAL DAILY
Status: DISCONTINUED | OUTPATIENT
Start: 2018-01-01 | End: 2018-01-01

## 2018-01-01 RX ORDER — HYDROMORPHONE HYDROCHLORIDE 1 MG/ML
0.5 INJECTION, SOLUTION INTRAMUSCULAR; INTRAVENOUS; SUBCUTANEOUS
Status: DISCONTINUED | OUTPATIENT
Start: 2018-01-01 | End: 2018-01-01 | Stop reason: HOSPADM

## 2018-01-01 RX ORDER — WARFARIN SODIUM 3 MG/1
3 TABLET ORAL
Status: DISCONTINUED | OUTPATIENT
Start: 2018-01-01 | End: 2018-01-01

## 2018-01-01 RX ORDER — LENALIDOMIDE 25 MG/1
25 CAPSULE ORAL DAILY
Qty: 14 CAPSULE | Refills: 0 | Status: SHIPPED | OUTPATIENT
Start: 2018-01-01 | End: 2018-01-01 | Stop reason: ALTCHOICE

## 2018-01-01 RX ORDER — DEXAMETHASONE 4 MG/1
40 TABLET ORAL
Qty: 48 TABLET | Refills: 1 | Status: CANCELLED | OUTPATIENT
Start: 2018-01-01

## 2018-01-01 RX ORDER — CALCIUM CARBONATE 200(500)MG
1 TABLET,CHEWABLE ORAL DAILY
COMMUNITY

## 2018-01-01 RX ORDER — ACYCLOVIR 200 MG/5ML
400 SUSPENSION ORAL 2 TIMES DAILY
Status: DISCONTINUED | OUTPATIENT
Start: 2018-01-01 | End: 2018-01-01

## 2018-01-01 RX ORDER — ACYCLOVIR 400 MG/1
400 TABLET ORAL 2 TIMES DAILY
Qty: 60 TABLET | Refills: 11 | Status: SHIPPED | OUTPATIENT
Start: 2018-01-01 | End: 2018-01-01 | Stop reason: SDUPTHER

## 2018-01-01 RX ORDER — LEVETIRACETAM 500 MG/1
500 TABLET ORAL 2 TIMES DAILY
Status: DISCONTINUED | OUTPATIENT
Start: 2018-01-01 | End: 2018-01-01 | Stop reason: SDUPTHER

## 2018-01-01 RX ORDER — SENNA AND DOCUSATE SODIUM 50; 8.6 MG/1; MG/1
2 TABLET, FILM COATED ORAL NIGHTLY PRN
Status: DISCONTINUED | OUTPATIENT
Start: 2018-01-01 | End: 2018-01-01

## 2018-01-01 RX ORDER — WARFARIN SODIUM 5 MG/1
5 TABLET ORAL
Status: DISCONTINUED | OUTPATIENT
Start: 2018-01-01 | End: 2018-01-01 | Stop reason: ALTCHOICE

## 2018-01-01 RX ORDER — BACLOFEN 10 MG/1
10 TABLET ORAL NIGHTLY
Status: DISCONTINUED | OUTPATIENT
Start: 2018-01-01 | End: 2018-01-01

## 2018-01-01 RX ORDER — PANTOPRAZOLE SODIUM 40 MG/1
40 TABLET, DELAYED RELEASE ORAL DAILY
Qty: 30 TABLET | Refills: 5 | Status: SHIPPED | OUTPATIENT
Start: 2018-01-01

## 2018-01-01 RX ORDER — FOLIC ACID 1 MG/1
1 TABLET ORAL DAILY
Status: DISCONTINUED | OUTPATIENT
Start: 2018-01-01 | End: 2018-01-01

## 2018-01-01 RX ORDER — SODIUM CHLORIDE, SODIUM LACTATE, POTASSIUM CHLORIDE, CALCIUM CHLORIDE 600; 310; 30; 20 MG/100ML; MG/100ML; MG/100ML; MG/100ML
9 INJECTION, SOLUTION INTRAVENOUS CONTINUOUS
Status: DISCONTINUED | OUTPATIENT
Start: 2018-01-01 | End: 2018-01-01

## 2018-01-01 RX ORDER — ACYCLOVIR 400 MG/1
400 TABLET ORAL 2 TIMES DAILY
Qty: 60 TABLET | Refills: 11 | Status: SHIPPED | OUTPATIENT
Start: 2018-01-01

## 2018-01-01 RX ORDER — HYDROCODONE BITARTRATE AND ACETAMINOPHEN 5; 325 MG/1; MG/1
1 TABLET ORAL DAILY
Status: ON HOLD | COMMUNITY
End: 2018-01-01

## 2018-01-01 RX ORDER — HYDROCODONE BITARTRATE AND ACETAMINOPHEN 5; 325 MG/1; MG/1
1 TABLET ORAL EVERY 4 HOURS PRN
Status: DISPENSED | OUTPATIENT
Start: 2018-01-01 | End: 2018-01-01

## 2018-01-01 RX ORDER — FLUOXETINE HYDROCHLORIDE 20 MG/5ML
10 LIQUID ORAL DAILY
Status: DISCONTINUED | OUTPATIENT
Start: 2018-01-01 | End: 2018-01-01

## 2018-01-01 RX ORDER — MEPERIDINE HYDROCHLORIDE 50 MG/ML
25 INJECTION INTRAMUSCULAR; INTRAVENOUS; SUBCUTANEOUS
OUTPATIENT
Start: 2018-08-23 | End: 2018-08-21

## 2018-01-01 RX ORDER — ACETAMINOPHEN 500 MG
1000 TABLET ORAL ONCE
OUTPATIENT
Start: 2018-08-23

## 2018-01-01 RX ORDER — LEVETIRACETAM 1000 MG/1
TABLET ORAL
Qty: 90 TABLET | Refills: 0 | Status: SHIPPED | OUTPATIENT
Start: 2018-01-01 | End: 2018-01-01 | Stop reason: SDUPTHER

## 2018-01-01 RX ORDER — ONDANSETRON 2 MG/ML
4 INJECTION INTRAMUSCULAR; INTRAVENOUS EVERY 6 HOURS PRN
Status: DISCONTINUED | OUTPATIENT
Start: 2018-01-01 | End: 2018-01-01 | Stop reason: HOSPADM

## 2018-01-01 RX ORDER — ONDANSETRON HYDROCHLORIDE 8 MG/1
8 TABLET, FILM COATED ORAL 3 TIMES DAILY PRN
Qty: 30 TABLET | Refills: 5 | Status: CANCELLED | OUTPATIENT
Start: 2018-01-01

## 2018-01-01 RX ORDER — ONDANSETRON HYDROCHLORIDE 8 MG/1
TABLET, FILM COATED ORAL
Qty: 30 TABLET | Refills: 5 | Status: SHIPPED | OUTPATIENT
Start: 2018-01-01 | End: 2018-01-01 | Stop reason: SDUPTHER

## 2018-01-01 RX ORDER — ACETAMINOPHEN 325 MG/1
650 TABLET ORAL EVERY 4 HOURS PRN
Status: DISCONTINUED | OUTPATIENT
Start: 2018-01-01 | End: 2018-01-01 | Stop reason: SDUPTHER

## 2018-01-01 RX ORDER — SEVELAMER HYDROCHLORIDE 800 MG/1
1600 TABLET, FILM COATED ORAL
Status: DISCONTINUED | OUTPATIENT
Start: 2018-01-01 | End: 2018-01-01

## 2018-01-01 RX ORDER — GABAPENTIN 100 MG/1
100 CAPSULE ORAL NIGHTLY
Status: DISCONTINUED | OUTPATIENT
Start: 2018-01-01 | End: 2018-01-01 | Stop reason: SDUPTHER

## 2018-01-01 RX ORDER — FAMOTIDINE 10 MG/ML
20 INJECTION, SOLUTION INTRAVENOUS AS NEEDED
OUTPATIENT
Start: 2018-08-30

## 2018-01-01 RX ORDER — SODIUM CHLORIDE 9 MG/ML
250 INJECTION, SOLUTION INTRAVENOUS ONCE
OUTPATIENT
Start: 2018-08-30

## 2018-01-01 RX ORDER — FAMOTIDINE 20 MG/1
20 TABLET, FILM COATED ORAL DAILY PRN
Status: DISCONTINUED | OUTPATIENT
Start: 2018-01-01 | End: 2018-01-01

## 2018-01-01 RX ORDER — LEVETIRACETAM 1000 MG/1
TABLET ORAL
Qty: 90 TABLET | Refills: 0 | Status: SHIPPED | OUTPATIENT
Start: 2018-01-01

## 2018-01-01 RX ORDER — METHYLPREDNISOLONE SODIUM SUCCINATE 125 MG/2ML
60 INJECTION, POWDER, LYOPHILIZED, FOR SOLUTION INTRAMUSCULAR; INTRAVENOUS ONCE
OUTPATIENT
Start: 2018-08-30

## 2018-01-01 RX ORDER — SODIUM PHOSPHATE, DIBASIC, ANHYDROUS, POTASSIUM PHOSPHATE, MONOBASIC, AND SODIUM PHOSPHATE, MONOBASIC, MONOHYDRATE 852; 155; 130 MG/1; MG/1; MG/1
1 TABLET, COATED ORAL DAILY
Qty: 30 TABLET | Refills: 5 | Status: SHIPPED | OUTPATIENT
Start: 2018-01-01

## 2018-01-01 RX ADMIN — Medication 5 MG: at 22:29

## 2018-01-01 RX ADMIN — LEVETIRACETAM 500 MG: 500 TABLET, FILM COATED ORAL at 16:05

## 2018-01-01 RX ADMIN — ZOLEDRONIC ACID 4 MG: 4 INJECTION, SOLUTION, CONCENTRATE INTRAVENOUS at 14:53

## 2018-01-01 RX ADMIN — LEVETIRACETAM 500 MG: 500 TABLET, FILM COATED ORAL at 21:11

## 2018-01-01 RX ADMIN — Medication 5 MG: at 21:29

## 2018-01-01 RX ADMIN — ONDANSETRON 4 MG: 2 INJECTION INTRAMUSCULAR; INTRAVENOUS at 14:57

## 2018-01-01 RX ADMIN — SODIUM CHLORIDE 250 ML: 9 INJECTION, SOLUTION INTRAVENOUS at 12:03

## 2018-01-01 RX ADMIN — FOLIC ACID 1 MG: 1 TABLET ORAL at 09:29

## 2018-01-01 RX ADMIN — HYDROMORPHONE HYDROCHLORIDE 1 MG: 1 INJECTION, SOLUTION INTRAMUSCULAR; INTRAVENOUS; SUBCUTANEOUS at 15:31

## 2018-01-01 RX ADMIN — OXCARBAZEPINE 300 MG: 150 TABLET ORAL at 21:29

## 2018-01-01 RX ADMIN — Medication 250 MG: at 23:27

## 2018-01-01 RX ADMIN — DEXAMETHASONE SODIUM PHOSPHATE 4 MG: 4 INJECTION, SOLUTION INTRAMUSCULAR; INTRAVENOUS at 13:15

## 2018-01-01 RX ADMIN — CYANOCOBALAMIN TAB 1000 MCG 500 MCG: 1000 TAB at 08:50

## 2018-01-01 RX ADMIN — OXCARBAZEPINE 300 MG: 150 TABLET ORAL at 20:32

## 2018-01-01 RX ADMIN — SODIUM CHLORIDE 250 ML: 0.9 INJECTION, SOLUTION INTRAVENOUS at 11:50

## 2018-01-01 RX ADMIN — GABAPENTIN 100 MG: 100 CAPSULE ORAL at 21:56

## 2018-01-01 RX ADMIN — CARFILZOMIB 60 MG: 60 INJECTION, POWDER, LYOPHILIZED, FOR SOLUTION INTRAVENOUS at 13:11

## 2018-01-01 RX ADMIN — Medication 250 MG: at 08:22

## 2018-01-01 RX ADMIN — LORAZEPAM 1 MG: 2 INJECTION INTRAMUSCULAR; INTRAVENOUS at 00:24

## 2018-01-01 RX ADMIN — ALBUMIN HUMAN 25 G: 0.25 SOLUTION INTRAVENOUS at 12:18

## 2018-01-01 RX ADMIN — CEFTRIAXONE SODIUM 1 G: 1 INJECTION, SOLUTION INTRAVENOUS at 16:00

## 2018-01-01 RX ADMIN — LEVETIRACETAM 500 MG: 500 TABLET, FILM COATED ORAL at 08:51

## 2018-01-01 RX ADMIN — HYDROMORPHONE HYDROCHLORIDE 1 MG: 1 INJECTION, SOLUTION INTRAMUSCULAR; INTRAVENOUS; SUBCUTANEOUS at 05:56

## 2018-01-01 RX ADMIN — HYDROMORPHONE HYDROCHLORIDE: 10 INJECTION INTRAMUSCULAR; INTRAVENOUS; SUBCUTANEOUS at 11:22

## 2018-01-01 RX ADMIN — FLUOXETINE HYDROCHLORIDE 10 MG: 20 SOLUTION ORAL at 08:21

## 2018-01-01 RX ADMIN — LORAZEPAM 1 MG: 2 INJECTION INTRAMUSCULAR; INTRAVENOUS at 09:21

## 2018-01-01 RX ADMIN — ACYCLOVIR 400 MG: 200 CAPSULE ORAL at 08:30

## 2018-01-01 RX ADMIN — ACYCLOVIR 400 MG: 200 CAPSULE ORAL at 22:32

## 2018-01-01 RX ADMIN — FLUOXETINE 10 MG: 10 CAPSULE ORAL at 08:51

## 2018-01-01 RX ADMIN — HALOPERIDOL LACTATE 5 MG: 5 INJECTION, SOLUTION INTRAMUSCULAR at 18:11

## 2018-01-01 RX ADMIN — HALOPERIDOL LACTATE 2 MG: 5 INJECTION, SOLUTION INTRAMUSCULAR at 10:15

## 2018-01-01 RX ADMIN — Medication 250 MG: at 21:56

## 2018-01-01 RX ADMIN — HYDROMORPHONE HYDROCHLORIDE 1 MG: 1 INJECTION, SOLUTION INTRAMUSCULAR; INTRAVENOUS; SUBCUTANEOUS at 00:27

## 2018-01-01 RX ADMIN — SODIUM CHLORIDE 1000 ML: 9 INJECTION, SOLUTION INTRAVENOUS at 21:42

## 2018-01-01 RX ADMIN — PANTOPRAZOLE SODIUM 40 MG: 40 INJECTION, POWDER, FOR SOLUTION INTRAVENOUS at 05:45

## 2018-01-01 RX ADMIN — ACYCLOVIR 400 MG: 200 CAPSULE ORAL at 21:56

## 2018-01-01 RX ADMIN — ACYCLOVIR 400 MG: 200 CAPSULE ORAL at 09:28

## 2018-01-01 RX ADMIN — HYDROMORPHONE HYDROCHLORIDE 1 MG: 1 INJECTION, SOLUTION INTRAMUSCULAR; INTRAVENOUS; SUBCUTANEOUS at 02:30

## 2018-01-01 RX ADMIN — OXCARBAZEPINE 300 MG: 150 TABLET ORAL at 08:27

## 2018-01-01 RX ADMIN — DEXAMETHASONE SODIUM PHOSPHATE 4 MG: 4 INJECTION, SOLUTION INTRAMUSCULAR; INTRAVENOUS at 13:00

## 2018-01-01 RX ADMIN — AZELASTINE HYDROCHLORIDE 2 SPRAY: 137 SPRAY, METERED NASAL at 09:26

## 2018-01-01 RX ADMIN — CEFTRIAXONE SODIUM 1 G: 1 INJECTION, SOLUTION INTRAVENOUS at 17:11

## 2018-01-01 RX ADMIN — CARFILZOMIB 60 MG: 60 INJECTION, POWDER, LYOPHILIZED, FOR SOLUTION INTRAVENOUS at 13:31

## 2018-01-01 RX ADMIN — OXCARBAZEPINE 300 MG: 150 TABLET ORAL at 22:17

## 2018-01-01 RX ADMIN — Medication 250 MG: at 09:27

## 2018-01-01 RX ADMIN — SODIUM CHLORIDE 250 ML: 9 INJECTION, SOLUTION INTRAVENOUS at 10:05

## 2018-01-01 RX ADMIN — LIDOCAINE: 40 CREAM TOPICAL at 06:59

## 2018-01-01 RX ADMIN — LEVETIRACETAM 500 MG: 5 INJECTION INTRAVENOUS at 14:19

## 2018-01-01 RX ADMIN — PROPOFOL 75 MCG/KG/MIN: 10 INJECTION, EMULSION INTRAVENOUS at 10:40

## 2018-01-01 RX ADMIN — SULFUR HEXAFLUORIDE 2 ML: KIT at 14:30

## 2018-01-01 RX ADMIN — SODIUM BICARBONATE 650 MG TABLET 1300 MG: at 08:25

## 2018-01-01 RX ADMIN — Medication 5 MG: at 21:27

## 2018-01-01 RX ADMIN — LORAZEPAM 1 MG: 2 INJECTION INTRAMUSCULAR; INTRAVENOUS at 23:46

## 2018-01-01 RX ADMIN — OXCARBAZEPINE 300 MG: 150 TABLET ORAL at 09:27

## 2018-01-01 RX ADMIN — GABAPENTIN 100 MG: 100 CAPSULE ORAL at 21:23

## 2018-01-01 RX ADMIN — CETIRIZINE HYDROCHLORIDE 10 MG: 10 TABLET, FILM COATED ORAL at 21:56

## 2018-01-01 RX ADMIN — LORAZEPAM 1 MG: 2 INJECTION INTRAMUSCULAR; INTRAVENOUS at 09:13

## 2018-01-01 RX ADMIN — CARFILZOMIB 60 MG: 60 INJECTION, POWDER, LYOPHILIZED, FOR SOLUTION INTRAVENOUS at 13:39

## 2018-01-01 RX ADMIN — HYDROCODONE BITARTRATE AND ACETAMINOPHEN 1 TABLET: 5; 325 TABLET ORAL at 22:17

## 2018-01-01 RX ADMIN — FLUOXETINE 10 MG: 10 CAPSULE ORAL at 08:30

## 2018-01-01 RX ADMIN — CARFILZOMIB 60 MG: 60 INJECTION, POWDER, LYOPHILIZED, FOR SOLUTION INTRAVENOUS at 13:05

## 2018-01-01 RX ADMIN — OXCARBAZEPINE 300 MG: 150 TABLET ORAL at 08:22

## 2018-01-01 RX ADMIN — ACYCLOVIR 400 MG: 200 CAPSULE ORAL at 08:50

## 2018-01-01 RX ADMIN — SODIUM CHLORIDE 250 ML: 9 INJECTION, SOLUTION INTRAVENOUS at 13:32

## 2018-01-01 RX ADMIN — FOLIC ACID 1 MG: 1 TABLET ORAL at 08:28

## 2018-01-01 RX ADMIN — CYANOCOBALAMIN TAB 1000 MCG 500 MCG: 1000 TAB at 08:24

## 2018-01-01 RX ADMIN — Medication 250 MG: at 08:26

## 2018-01-01 RX ADMIN — GABAPENTIN 100 MG: 100 CAPSULE ORAL at 22:06

## 2018-01-01 RX ADMIN — DEXAMETHASONE SODIUM PHOSPHATE 30 MG: 4 INJECTION, SOLUTION INTRAMUSCULAR; INTRAVENOUS at 08:29

## 2018-01-01 RX ADMIN — OXCARBAZEPINE 300 MG: 150 TABLET ORAL at 23:26

## 2018-01-01 RX ADMIN — SODIUM CHLORIDE 250 ML: 9 INJECTION, SOLUTION INTRAVENOUS at 13:05

## 2018-01-01 RX ADMIN — GUAIFENESIN AND DEXTROMETHORPHAN 5 ML: 100; 10 SYRUP ORAL at 23:09

## 2018-01-01 RX ADMIN — CARFILZOMIB 60 MG: 60 INJECTION, POWDER, LYOPHILIZED, FOR SOLUTION INTRAVENOUS at 12:19

## 2018-01-01 RX ADMIN — BACLOFEN 10 MG: 10 TABLET ORAL at 21:23

## 2018-01-01 RX ADMIN — CARFILZOMIB 60 MG: 60 INJECTION, POWDER, LYOPHILIZED, FOR SOLUTION INTRAVENOUS at 14:11

## 2018-01-01 RX ADMIN — AZELASTINE HYDROCHLORIDE 2 SPRAY: 137 SPRAY, METERED NASAL at 22:16

## 2018-01-01 RX ADMIN — SODIUM CHLORIDE 250 ML: 9 INJECTION, SOLUTION INTRAVENOUS at 13:39

## 2018-01-01 RX ADMIN — CYANOCOBALAMIN TAB 1000 MCG 500 MCG: 1000 TAB at 08:29

## 2018-01-01 RX ADMIN — CARFILZOMIB 60 MG: 60 INJECTION, POWDER, LYOPHILIZED, FOR SOLUTION INTRAVENOUS at 13:49

## 2018-01-01 RX ADMIN — Medication 5 MG: at 21:24

## 2018-01-01 RX ADMIN — LORAZEPAM 1 MG: 2 INJECTION INTRAMUSCULAR; INTRAVENOUS at 04:50

## 2018-01-01 RX ADMIN — SODIUM BICARBONATE 650 MG TABLET 1300 MG: at 20:44

## 2018-01-01 RX ADMIN — CARFILZOMIB 60 MG: 60 INJECTION, POWDER, LYOPHILIZED, FOR SOLUTION INTRAVENOUS at 13:46

## 2018-01-01 RX ADMIN — PANTOPRAZOLE SODIUM 40 MG: 40 TABLET, DELAYED RELEASE ORAL at 06:11

## 2018-01-01 RX ADMIN — BACLOFEN 10 MG: 10 TABLET ORAL at 23:26

## 2018-01-01 RX ADMIN — LORAZEPAM 0.5 MG: 2 INJECTION INTRAMUSCULAR; INTRAVENOUS at 23:53

## 2018-01-01 RX ADMIN — HYDROCODONE BITARTRATE AND ACETAMINOPHEN 10 ML: 7.5; 325 SOLUTION ORAL at 00:36

## 2018-01-01 RX ADMIN — ALBUMIN HUMAN 25 G: 0.25 SOLUTION INTRAVENOUS at 09:04

## 2018-01-01 RX ADMIN — ACYCLOVIR 400 MG: 200 CAPSULE ORAL at 21:23

## 2018-01-01 RX ADMIN — SODIUM BICARBONATE 650 MG TABLET 1300 MG: at 21:28

## 2018-01-01 RX ADMIN — LIDOCAINE 3 PATCH: 50 PATCH CUTANEOUS at 09:27

## 2018-01-01 RX ADMIN — OXCARBAZEPINE 300 MG: 150 TABLET ORAL at 21:36

## 2018-01-01 RX ADMIN — CETIRIZINE HYDROCHLORIDE 10 MG: 10 TABLET, FILM COATED ORAL at 22:27

## 2018-01-01 RX ADMIN — GABAPENTIN 100 MG: 250 SOLUTION ORAL at 23:10

## 2018-01-01 RX ADMIN — PANTOPRAZOLE SODIUM 40 MG: 40 TABLET, DELAYED RELEASE ORAL at 05:12

## 2018-01-01 RX ADMIN — PANTOPRAZOLE SODIUM 40 MG: 40 INJECTION, POWDER, FOR SOLUTION INTRAVENOUS at 05:30

## 2018-01-01 RX ADMIN — ACYCLOVIR 400 MG: 200 SUSPENSION ORAL at 20:44

## 2018-01-01 RX ADMIN — CARFILZOMIB 60 MG: 60 INJECTION, POWDER, LYOPHILIZED, FOR SOLUTION INTRAVENOUS at 13:24

## 2018-01-01 RX ADMIN — LEVETIRACETAM 500 MG: 500 TABLET, FILM COATED ORAL at 21:28

## 2018-01-01 RX ADMIN — SODIUM BICARBONATE 650 MG TABLET 1300 MG: at 16:45

## 2018-01-01 RX ADMIN — SODIUM CHLORIDE 250 ML: 0.9 INJECTION, SOLUTION INTRAVENOUS at 13:45

## 2018-01-01 RX ADMIN — LEVETIRACETAM 500 MG: 100 SOLUTION ORAL at 20:32

## 2018-01-01 RX ADMIN — SODIUM CHLORIDE 250 ML: 9 INJECTION, SOLUTION INTRAVENOUS at 12:58

## 2018-01-01 RX ADMIN — CARFILZOMIB 60 MG: 60 INJECTION, POWDER, LYOPHILIZED, FOR SOLUTION INTRAVENOUS at 13:55

## 2018-01-01 RX ADMIN — DEXAMETHASONE SODIUM PHOSPHATE 4 MG: 4 INJECTION, SOLUTION INTRA-ARTICULAR; INTRALESIONAL; INTRAMUSCULAR; INTRAVENOUS; SOFT TISSUE at 13:33

## 2018-01-01 RX ADMIN — GABAPENTIN 100 MG: 100 CAPSULE ORAL at 21:28

## 2018-01-01 RX ADMIN — Medication 250 MG: at 22:27

## 2018-01-01 RX ADMIN — LEVETIRACETAM 500 MG: 500 TABLET, FILM COATED ORAL at 23:27

## 2018-01-01 RX ADMIN — Medication 250 MG: at 21:28

## 2018-01-01 RX ADMIN — Medication 10 ML: at 09:29

## 2018-01-01 RX ADMIN — PANTOPRAZOLE SODIUM 40 MG: 40 TABLET, DELAYED RELEASE ORAL at 06:58

## 2018-01-01 RX ADMIN — CARFILZOMIB 60 MG: 60 INJECTION, POWDER, LYOPHILIZED, FOR SOLUTION INTRAVENOUS at 11:36

## 2018-01-01 RX ADMIN — SODIUM BICARBONATE 650 MG TABLET 1300 MG: at 16:42

## 2018-01-01 RX ADMIN — SODIUM CHLORIDE 9 ML/HR: 9 INJECTION, SOLUTION INTRAVENOUS at 09:29

## 2018-01-01 RX ADMIN — SIMETHICONE CHEW TAB 80 MG 80 MG: 80 TABLET ORAL at 03:21

## 2018-01-01 RX ADMIN — BACLOFEN 10 MG: 10 TABLET ORAL at 20:44

## 2018-01-01 RX ADMIN — DEXAMETHASONE SODIUM PHOSPHATE 40 MG: 10 INJECTION INTRAMUSCULAR; INTRAVENOUS at 15:19

## 2018-01-01 RX ADMIN — CARFILZOMIB 44 MG: 60 INJECTION, POWDER, LYOPHILIZED, FOR SOLUTION INTRAVENOUS at 13:29

## 2018-01-01 RX ADMIN — CARFILZOMIB 60 MG: 60 INJECTION, POWDER, LYOPHILIZED, FOR SOLUTION INTRAVENOUS at 13:50

## 2018-01-01 RX ADMIN — OXCARBAZEPINE 300 MG: 150 TABLET ORAL at 20:44

## 2018-01-01 RX ADMIN — FOLIC ACID 1 MG: 1 TABLET ORAL at 08:22

## 2018-01-01 RX ADMIN — FLUOXETINE 10 MG: 10 CAPSULE ORAL at 16:05

## 2018-01-01 RX ADMIN — HALOPERIDOL LACTATE 2 MG: 5 INJECTION, SOLUTION INTRAMUSCULAR at 05:13

## 2018-01-01 RX ADMIN — OXCARBAZEPINE 300 MG: 150 TABLET ORAL at 21:56

## 2018-01-01 RX ADMIN — HYDROMORPHONE HYDROCHLORIDE 1 MG: 1 INJECTION, SOLUTION INTRAMUSCULAR; INTRAVENOUS; SUBCUTANEOUS at 08:58

## 2018-01-01 RX ADMIN — FENTANYL CITRATE 25 MCG: 50 INJECTION, SOLUTION INTRAMUSCULAR; INTRAVENOUS at 10:40

## 2018-01-01 RX ADMIN — Medication 250 MG: at 08:50

## 2018-01-01 RX ADMIN — LEVETIRACETAM 500 MG: 100 SOLUTION ORAL at 08:26

## 2018-01-01 RX ADMIN — OXCARBAZEPINE 300 MG: 150 TABLET ORAL at 21:24

## 2018-01-01 RX ADMIN — AZELASTINE HYDROCHLORIDE 2 SPRAY: 137 SPRAY, METERED NASAL at 22:30

## 2018-01-01 RX ADMIN — DEXAMETHASONE SODIUM PHOSPHATE: 10 INJECTION INTRAMUSCULAR; INTRAVENOUS at 09:07

## 2018-01-01 RX ADMIN — LORAZEPAM 1 MG: 2 INJECTION INTRAMUSCULAR; INTRAVENOUS at 11:30

## 2018-01-01 RX ADMIN — SODIUM CHLORIDE 250 ML: 9 INJECTION, SOLUTION INTRAVENOUS at 13:30

## 2018-01-01 RX ADMIN — LEVETIRACETAM 500 MG: 500 TABLET, FILM COATED ORAL at 22:15

## 2018-01-01 RX ADMIN — LORAZEPAM 1 MG: 2 INJECTION INTRAMUSCULAR; INTRAVENOUS at 09:54

## 2018-01-01 RX ADMIN — LEVETIRACETAM 500 MG: 5 INJECTION INTRAVENOUS at 20:15

## 2018-01-01 RX ADMIN — SODIUM BICARBONATE 650 MG TABLET 1300 MG: at 08:22

## 2018-01-01 RX ADMIN — OXCARBAZEPINE 300 MG: 150 TABLET ORAL at 08:50

## 2018-01-01 RX ADMIN — ACYCLOVIR 400 MG: 200 CAPSULE ORAL at 09:26

## 2018-01-01 RX ADMIN — SODIUM BICARBONATE 650 MG TABLET 1300 MG: at 21:20

## 2018-01-01 RX ADMIN — CARFILZOMIB 60 MG: 60 INJECTION, POWDER, LYOPHILIZED, FOR SOLUTION INTRAVENOUS at 15:28

## 2018-01-01 RX ADMIN — CETIRIZINE HYDROCHLORIDE 10 MG: 10 TABLET, FILM COATED ORAL at 21:11

## 2018-01-01 RX ADMIN — AZELASTINE HYDROCHLORIDE 2 SPRAY: 137 SPRAY, METERED NASAL at 21:20

## 2018-01-01 RX ADMIN — CARFILZOMIB 60 MG: 60 INJECTION, POWDER, LYOPHILIZED, FOR SOLUTION INTRAVENOUS at 13:02

## 2018-01-01 RX ADMIN — ZOLEDRONIC ACID 4 MG: 4 INJECTION, SOLUTION, CONCENTRATE INTRAVENOUS at 12:25

## 2018-01-01 RX ADMIN — ZOLEDRONIC ACID 4 MG: 4 INJECTION, SOLUTION, CONCENTRATE INTRAVENOUS at 10:05

## 2018-01-01 RX ADMIN — GABAPENTIN 100 MG: 250 SOLUTION ORAL at 20:44

## 2018-01-01 RX ADMIN — FOLIC ACID 1 MG: 1 TABLET ORAL at 08:24

## 2018-01-01 RX ADMIN — CYANOCOBALAMIN TAB 1000 MCG 500 MCG: 1000 TAB at 09:26

## 2018-01-01 RX ADMIN — CETIRIZINE HYDROCHLORIDE 10 MG: 10 TABLET, FILM COATED ORAL at 23:27

## 2018-01-01 RX ADMIN — SODIUM CHLORIDE 80 ML/HR: 9 INJECTION, SOLUTION INTRAVENOUS at 09:27

## 2018-01-01 RX ADMIN — ACYCLOVIR 400 MG: 200 CAPSULE ORAL at 22:17

## 2018-01-01 RX ADMIN — CYANOCOBALAMIN TAB 1000 MCG 500 MCG: 1000 TAB at 08:23

## 2018-01-01 RX ADMIN — PANTOPRAZOLE SODIUM 40 MG: 40 INJECTION, POWDER, FOR SOLUTION INTRAVENOUS at 09:22

## 2018-01-01 RX ADMIN — Medication 250 MG: at 21:23

## 2018-01-01 RX ADMIN — ZOLEDRONIC ACID 4 MG: 4 INJECTION, SOLUTION, CONCENTRATE INTRAVENOUS at 13:30

## 2018-01-01 RX ADMIN — HEPARIN SODIUM 2000 UNITS: 1000 INJECTION, SOLUTION INTRAVENOUS; SUBCUTANEOUS at 08:50

## 2018-01-01 RX ADMIN — CARFILZOMIB 60 MG: 60 INJECTION, POWDER, LYOPHILIZED, FOR SOLUTION INTRAVENOUS at 14:02

## 2018-01-01 RX ADMIN — DEXAMETHASONE SODIUM PHOSPHATE 30 MG: 4 INJECTION, SOLUTION INTRAMUSCULAR; INTRAVENOUS at 12:31

## 2018-01-01 RX ADMIN — Medication 250 MG: at 20:44

## 2018-01-01 RX ADMIN — DEXAMETHASONE SODIUM PHOSPHATE 30 MG: 4 INJECTION, SOLUTION INTRAMUSCULAR; INTRAVENOUS at 08:20

## 2018-01-01 RX ADMIN — ZOLEDRONIC ACID 4 MG: 4 INJECTION, SOLUTION, CONCENTRATE INTRAVENOUS at 10:06

## 2018-01-01 RX ADMIN — PHYTONADIONE 10 MG: 10 INJECTION, EMULSION INTRAMUSCULAR; INTRAVENOUS; SUBCUTANEOUS at 21:43

## 2018-01-01 RX ADMIN — ACYCLOVIR 400 MG: 200 SUSPENSION ORAL at 08:20

## 2018-01-01 RX ADMIN — CARFILZOMIB 60 MG: 60 INJECTION, POWDER, LYOPHILIZED, FOR SOLUTION INTRAVENOUS at 10:23

## 2018-01-01 RX ADMIN — LEVETIRACETAM 500 MG: 500 TABLET, FILM COATED ORAL at 22:17

## 2018-01-01 RX ADMIN — FLUOXETINE 10 MG: 10 CAPSULE ORAL at 09:27

## 2018-01-01 RX ADMIN — SODIUM CHLORIDE 250 ML: 9 INJECTION, SOLUTION INTRAVENOUS at 15:44

## 2018-01-01 RX ADMIN — AZELASTINE HYDROCHLORIDE 2 SPRAY: 137 SPRAY, METERED NASAL at 21:57

## 2018-01-01 RX ADMIN — SODIUM CHLORIDE 250 ML: 9 INJECTION, SOLUTION INTRAVENOUS at 13:16

## 2018-01-01 RX ADMIN — GABAPENTIN 100 MG: 100 CAPSULE ORAL at 23:26

## 2018-01-01 RX ADMIN — ACYCLOVIR 400 MG: 200 CAPSULE ORAL at 23:25

## 2018-01-01 RX ADMIN — PANTOPRAZOLE SODIUM 40 MG: 40 INJECTION, POWDER, FOR SOLUTION INTRAVENOUS at 05:50

## 2018-01-01 RX ADMIN — LEVETIRACETAM 500 MG: 100 SOLUTION ORAL at 08:22

## 2018-01-01 RX ADMIN — CARFILZOMIB 60 MG: 60 INJECTION, POWDER, LYOPHILIZED, FOR SOLUTION INTRAVENOUS at 14:03

## 2018-01-01 RX ADMIN — CARFILZOMIB 60 MG: 60 INJECTION, POWDER, LYOPHILIZED, FOR SOLUTION INTRAVENOUS at 13:34

## 2018-01-01 RX ADMIN — DEXAMETHASONE SODIUM PHOSPHATE 40 MG: 10 INJECTION INTRAMUSCULAR; INTRAVENOUS at 10:36

## 2018-01-01 RX ADMIN — LEVETIRACETAM 500 MG: 500 TABLET, FILM COATED ORAL at 09:26

## 2018-01-01 RX ADMIN — AZELASTINE HYDROCHLORIDE 2 SPRAY: 137 SPRAY, METERED NASAL at 09:30

## 2018-01-01 RX ADMIN — CARFILZOMIB 60 MG: 60 INJECTION, POWDER, LYOPHILIZED, FOR SOLUTION INTRAVENOUS at 14:30

## 2018-01-01 RX ADMIN — OXCARBAZEPINE 300 MG: 150 TABLET ORAL at 22:10

## 2018-01-01 RX ADMIN — LORAZEPAM 1 MG: 2 INJECTION INTRAMUSCULAR; INTRAVENOUS at 08:58

## 2018-01-01 RX ADMIN — AZELASTINE HYDROCHLORIDE 2 SPRAY: 137 SPRAY, METERED NASAL at 23:29

## 2018-01-01 RX ADMIN — CARFILZOMIB 60 MG: 60 INJECTION, POWDER, LYOPHILIZED, FOR SOLUTION INTRAVENOUS at 14:16

## 2018-01-01 RX ADMIN — CETIRIZINE HYDROCHLORIDE 10 MG: 10 TABLET, FILM COATED ORAL at 22:13

## 2018-01-01 RX ADMIN — OXCARBAZEPINE 300 MG: 150 TABLET ORAL at 08:30

## 2018-01-01 RX ADMIN — CARFILZOMIB 60 MG: 60 INJECTION, POWDER, LYOPHILIZED, FOR SOLUTION INTRAVENOUS at 13:32

## 2018-01-01 RX ADMIN — CYANOCOBALAMIN TAB 1000 MCG 500 MCG: 1000 TAB at 11:37

## 2018-01-01 RX ADMIN — PANTOPRAZOLE SODIUM 40 MG: 40 TABLET, DELAYED RELEASE ORAL at 06:35

## 2018-01-01 RX ADMIN — PANTOPRAZOLE SODIUM 40 MG: 40 INJECTION, POWDER, FOR SOLUTION INTRAVENOUS at 05:51

## 2018-01-01 RX ADMIN — CYANOCOBALAMIN TAB 1000 MCG 500 MCG: 1000 TAB at 09:28

## 2018-01-01 RX ADMIN — FLUOXETINE HYDROCHLORIDE 10 MG: 20 SOLUTION ORAL at 08:28

## 2018-01-01 RX ADMIN — BACLOFEN 10 MG: 10 TABLET ORAL at 21:29

## 2018-01-01 RX ADMIN — CYANOCOBALAMIN TAB 1000 MCG 500 MCG: 1000 TAB at 16:05

## 2018-01-01 RX ADMIN — ALBUMIN HUMAN 25 G: 0.25 SOLUTION INTRAVENOUS at 12:56

## 2018-01-01 RX ADMIN — ACYCLOVIR 400 MG: 200 SUSPENSION ORAL at 08:25

## 2018-01-01 RX ADMIN — ACYCLOVIR 400 MG: 200 CAPSULE ORAL at 21:28

## 2018-01-01 RX ADMIN — LEVETIRACETAM 500 MG: 5 INJECTION INTRAVENOUS at 21:41

## 2018-01-01 RX ADMIN — GLYCOPYRROLATE 0.2 MG: 0.2 INJECTION, SOLUTION INTRAMUSCULAR; INTRAVENOUS at 02:38

## 2018-01-01 RX ADMIN — LEVETIRACETAM 500 MG: 5 INJECTION INTRAVENOUS at 08:30

## 2018-01-01 RX ADMIN — Medication 250 MG: at 21:11

## 2018-01-01 RX ADMIN — LEVETIRACETAM 500 MG: 5 INJECTION INTRAVENOUS at 13:45

## 2018-01-01 RX ADMIN — GABAPENTIN 100 MG: 250 SOLUTION ORAL at 21:29

## 2018-01-01 RX ADMIN — FOLIC ACID 1 MG: 1 TABLET ORAL at 08:51

## 2018-01-01 RX ADMIN — Medication 250 MG: at 20:32

## 2018-01-01 RX ADMIN — LEVETIRACETAM 500 MG: 100 SOLUTION ORAL at 20:44

## 2018-01-01 RX ADMIN — DEXAMETHASONE 40 MG: 4 TABLET ORAL at 08:24

## 2018-01-01 RX ADMIN — Medication 5 MG: at 20:44

## 2018-01-01 RX ADMIN — BACLOFEN 10 MG: 10 TABLET ORAL at 21:57

## 2018-01-01 RX ADMIN — LEVETIRACETAM 500 MG: 500 TABLET, FILM COATED ORAL at 09:29

## 2018-01-01 RX ADMIN — PHYTONADIONE 10 MG: 10 INJECTION, EMULSION INTRAMUSCULAR; INTRAVENOUS; SUBCUTANEOUS at 12:51

## 2018-01-01 RX ADMIN — PANTOPRAZOLE SODIUM 40 MG: 40 INJECTION, POWDER, FOR SOLUTION INTRAVENOUS at 05:35

## 2018-01-01 RX ADMIN — GABAPENTIN 100 MG: 100 CAPSULE ORAL at 21:11

## 2018-01-01 RX ADMIN — LEVETIRACETAM 500 MG: 500 TABLET, FILM COATED ORAL at 21:56

## 2018-01-01 RX ADMIN — ACYCLOVIR 400 MG: 200 SUSPENSION ORAL at 20:31

## 2018-01-01 RX ADMIN — HALOPERIDOL LACTATE 2 MG: 5 INJECTION, SOLUTION INTRAMUSCULAR at 03:27

## 2018-01-01 RX ADMIN — SODIUM CHLORIDE 250 ML: 9 INJECTION, SOLUTION INTRAVENOUS at 14:27

## 2018-01-01 RX ADMIN — HYDROMORPHONE HYDROCHLORIDE 1 MG: 1 INJECTION, SOLUTION INTRAMUSCULAR; INTRAVENOUS; SUBCUTANEOUS at 18:11

## 2018-01-01 RX ADMIN — SODIUM CHLORIDE 250 ML: 9 INJECTION, SOLUTION INTRAVENOUS at 13:27

## 2018-01-01 RX ADMIN — SODIUM CHLORIDE 7.5 MG: 9 INJECTION, SOLUTION INTRAVENOUS at 14:47

## 2018-01-01 RX ADMIN — FOLIC ACID 1 MG: 1 TABLET ORAL at 16:05

## 2018-01-01 RX ADMIN — IOPAMIDOL 80 ML: 755 INJECTION, SOLUTION INTRAVENOUS at 11:07

## 2018-01-01 RX ADMIN — FLUOXETINE 10 MG: 10 CAPSULE ORAL at 09:26

## 2018-01-01 RX ADMIN — HALOPERIDOL LACTATE 0.5 MG: 5 INJECTION, SOLUTION INTRAMUSCULAR at 21:34

## 2018-01-01 RX ADMIN — FOLIC ACID 1 MG: 1 TABLET ORAL at 09:26

## 2018-01-01 RX ADMIN — Medication 250 MG: at 08:24

## 2018-01-01 RX ADMIN — HALOPERIDOL LACTATE 2 MG: 5 INJECTION, SOLUTION INTRAMUSCULAR at 12:21

## 2018-01-01 RX ADMIN — SODIUM CHLORIDE 250 ML: 0.9 INJECTION, SOLUTION INTRAVENOUS at 14:05

## 2018-01-01 RX ADMIN — DEXAMETHASONE SODIUM PHOSPHATE 30 MG: 4 INJECTION, SOLUTION INTRAMUSCULAR; INTRAVENOUS at 18:08

## 2018-01-01 RX ADMIN — BACLOFEN 10 MG: 10 TABLET ORAL at 22:28

## 2018-01-01 RX ADMIN — ACYCLOVIR 400 MG: 200 CAPSULE ORAL at 21:11

## 2018-01-01 RX ADMIN — GLYCOPYRROLATE 0.2 MG: 0.2 INJECTION, SOLUTION INTRAMUSCULAR; INTRAVENOUS at 12:21

## 2018-01-01 RX ADMIN — HYDROCODONE BITARTRATE AND ACETAMINOPHEN 1 TABLET: 5; 325 TABLET ORAL at 22:05

## 2018-01-01 RX ADMIN — CARFILZOMIB 44 MG: 60 INJECTION, POWDER, LYOPHILIZED, FOR SOLUTION INTRAVENOUS at 12:53

## 2018-01-01 RX ADMIN — BACLOFEN 10 MG: 10 TABLET ORAL at 22:12

## 2018-01-01 RX ADMIN — LORAZEPAM 1 MG: 2 INJECTION INTRAMUSCULAR; INTRAVENOUS at 00:44

## 2018-01-01 RX ADMIN — BACLOFEN 10 MG: 10 TABLET ORAL at 21:28

## 2018-01-01 RX ADMIN — PANTOPRAZOLE SODIUM 40 MG: 40 TABLET, DELAYED RELEASE ORAL at 08:50

## 2018-01-01 RX ADMIN — BACLOFEN 10 MG: 10 TABLET ORAL at 20:32

## 2018-01-01 RX ADMIN — GABAPENTIN 100 MG: 100 CAPSULE ORAL at 22:27

## 2018-01-01 RX ADMIN — LORAZEPAM 0.5 MG: 2 INJECTION INTRAMUSCULAR; INTRAVENOUS at 15:34

## 2018-01-01 RX ADMIN — BACLOFEN 10 MG: 10 TABLET ORAL at 21:11

## 2018-01-02 NOTE — PROGRESS NOTES
DATE OF VISIT: 1/2/2018    REASON FOR VISIT: Followup for multiple myeloma, M-spike at diagnosis 2.4,  kappa restricted, with diffuse lytic lesions.      HISTORY OF PRESENT ILLNESS: The patient is a very pleasant 70-year-old  gentleman with past medical history significant for multiple myeloma diagnosed  11/26/2013. The patient presented with back pain, had a CAT scan done that  showed multiple vertebral body fractures with lytic lesions. He had a T11  biopsy done that came back with plasmocytosis compatible with multiple myeloma.  The patient was referred to me on 12/03/2013. I did a bone marrow biopsy that  came back consistent with multiple myeloma. Cytogenics at this point are still  pending. Whole body bone survey showed multiple bony lytic lesions affecting  right humerus, vertebral bodies, as well as pelvic bones. The patient had  serum protein electrophoresis done at diagnosis that showed monoclonal protein  of 2.5 grams plus 0.5 grams, kappa restricted. 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 The  patient was started on Revlimid 1.2 mg/sq m subcu once weekly 2 weeks on and 1  week off, Velcade 25 mg p.o. daily 2 weeks on and 1 week off, and dexamethasone  40 mg p.o. weekly on 12/09/2013. The patient completed cycle #6 on 04/07/2014.  The patient received autologous stem cell transplant with Dr. Bry Johnson at  [facility] on 05/13/2014. His day 100 was 08/26/2014. He had serum  protein electrophoresis that failed to show any evidence of monoclonal protein,  serum free light chain ratio was slightly elevated. The patient was started on  Revlimid 15 mg p.o. daily 2 weeks on and 1 week off on 06/16/2015. The patient's therapy was changed to Ninlaro 4 mg weekly 3 weeks on and 1 week off due to rising kappa lambda ratio. His kappa lambda ration continued rise of Ninlaro and he was started on Decadron 40 mg weekly with Revlimid 25 mg 2  weeks on and 1 week off on 4/14/2017.  The patient is here today in scheduled followup visit.    SUBJECTIVE: The patient has been doing fairly well. he was able to tolerate  his treatment without any serious side effects. he denied any fever or  chills, no night sweats, denied any headaches. No bleeding.  He is complaining of insomnia on days of Decadron.  He has mild fatigue.  He is gaining weight.  He is complaining of pain in bilateral lower ribs started after initiating Zometa and has been stable.    PAST MEDICAL HISTORY/SOCIAL HISTORY/FAMILY HISTORY: Unchanged from my prior documentation done on 12/03/2013.    Review of Systems   Constitutional: Positive for fatigue. Negative for activity change, appetite change, chills, fever and unexpected weight change.   HENT: Negative for hearing loss, mouth sores, nosebleeds, sore throat and trouble swallowing.    Eyes: Negative for visual disturbance.   Respiratory: Negative for cough, chest tightness, shortness of breath and wheezing.    Cardiovascular: Negative for chest pain, palpitations and leg swelling.   Gastrointestinal: Negative for abdominal distention, abdominal pain, blood in stool, constipation, diarrhea, nausea, rectal pain and vomiting.   Endocrine: Negative for cold intolerance and heat intolerance.   Genitourinary: Negative for difficulty urinating, dysuria, frequency and urgency.   Musculoskeletal: Positive for arthralgias. Negative for back pain, gait problem, joint swelling and myalgias.   Skin: Negative for rash.   Neurological: Positive for numbness. Negative for dizziness, tremors, syncope, weakness, light-headedness and headaches.   Hematological: Negative for adenopathy. Does not bruise/bleed easily.   Psychiatric/Behavioral: Negative for confusion, sleep disturbance and suicidal ideas. The patient is not nervous/anxious.          Current Outpatient Prescriptions:   •  aspirin 81 MG tablet, Take 81 mg by mouth Daily., Disp: , Rfl:   •  azelastine  (ASTELIN) 0.1 % nasal spray, into each nostril 2 (two) times a day., Disp: , Rfl:   •  baclofen (LIORESAL) 10 MG tablet, TK 1 - 2 TS QHS, Disp: , Rfl: 2  •  benzonatate (TESSALON) 200 MG capsule, TK ONE C PO  TID FOR 5 DAYS, Disp: , Rfl: 0  •  Calcium Carb-Cholecalciferol (CALCIUM + D3) 600-200 MG-UNIT tablet, Take  by mouth., Disp: , Rfl:   •  dexamethasone (DECADRON) 4 MG tablet, TAKE 10 TABLETS BY MOUTH WITH BREAKFAST, ON DAYS 1, 8, 15 AND 22, Disp: 40 tablet, Rfl: 5  •  docusate sodium (DOCQLACE) 100 MG capsule, Take  by mouth., Disp: , Rfl:   •  folic acid (FOLVITE) 1 MG tablet, TK 1 T PO QD, Disp: , Rfl: 5  •  gabapentin (NEURONTIN) 300 MG capsule, TAKE 1 CAPSULE BY MOUTH THREE TIMES DAILY, Disp: 90 capsule, Rfl: 1  •  Glucosamine-Chondroitin (OSTEO BI-FLEX REGULAR STRENGTH) 250-200 MG tablet, Take 1 tablet by mouth 2 (Two) Times a Day., Disp: , Rfl:   •  levETIRAcetam (KEPPRA) 1000 MG tablet, Take 1 tablet by mouth Daily. PATIENT TAKING 1.5 TAB TWICE A DAY (Patient taking differently: Take 500 mg by mouth 2 (Two) Times a Day. PATIENT TAKING 1/2 TAB TWICE A DAY), Disp: 90 tablet, Rfl: 6  •  lidocaine (LMX) 4 % cream, Apply  topically As Needed for Mild Pain ., Disp: , Rfl:   •  loratadine (CLARITIN) 10 MG tablet, Take  by mouth., Disp: , Rfl:   •  methylphenidate (CONCERTA) 36 MG CR tablet, Take 18 mg by mouth Every Morning., Disp: , Rfl:   •  NINLARO 4 MG capsule, Take 4 mg by mouth 1 (One) Time Per Week. 3 weeks on, 1 week off, Disp: 9 capsule, Rfl: 5  •  ondansetron (ZOFRAN) 8 MG tablet, Take 1 tablet by mouth 3 (Three) Times a Day As Needed for Nausea or Vomiting., Disp: 30 tablet, Rfl: 5  •  pantoprazole (PROTONIX) 40 MG EC tablet, Take 1 tablet by mouth Daily., Disp: 30 tablet, Rfl: 5  •  Probiotic Product (PROBIOTIC PO), Take 1 tablet by mouth 2 (Two) Times a Day As Needed., Disp: , Rfl:   •  sulfamethoxazole-trimethoprim (BACTRIM DS,SEPTRA DS) 800-160 MG per tablet, Take 1 tablet by mouth 2 (Two) Times  "a Day. One tablet twice a day on saturdays and sundays for prophylaxis, Disp: 60 tablet, Rfl: 3  •  Valerian Root 450 MG capsule, Take  by mouth., Disp: , Rfl:   •  vitamin B-12 (CYANOCOBALAMIN) 500 MCG tablet, Take 500 mcg by mouth Daily., Disp: , Rfl:   •  warfarin (COUMADIN) 5 MG tablet, Take 5 mg by mouth As Needed., Disp: , Rfl:   •  lenalidomide (REVLIMID) 25 MG capsule, Take 1 capsule by mouth Daily. Daily for 14 days on, 7 days off. AUTH: [id number] Adult Male, Disp: 14 capsule, Rfl: 0    PHYSICAL EXAMINATION:   /62  Pulse 92  Temp 97.7 °F (36.5 °C) (Temporal Artery )   Resp 16  Ht 177.8 cm (70\")  Wt (!) 138 kg (303 lb 6.4 oz)  SpO2 95%  BMI 43.53 kg/m2   ECOG Performance Status: 1 - Symptomatic but completely ambulatory  General Appearance:  alert, cooperative, no apparent distress and appears stated age   Neurologic/Psychiatric: A&O x 3, gait steady, appropriate affect, strength 5/5 in all muscle groups   HEENT:  Normocephalic, without obvious abnormality, mucous membranes moist   Neck: Supple, symmetrical, trachea midline, no adenopathy;  No thyromegaly, masses, or tenderness   Lungs:   Clear to auscultation bilaterally; respirations regular, even, and unlabored bilaterally   Heart:  Regular rate and rhythm, no murmurs appreciated   Abdomen:   Soft, non-tender, non-distended and no organomegaly   Lymph nodes: No cervical, supraclavicular, inguinal or axillary adenopathy noted   Extremities: Normal, atraumatic; no clubbing, cyanosis, or edema    Skin: No rashes, ulcers, or suspicious lesions noted     Lab on 12/27/2017   Component Date Value Ref Range Status   • Glucose 12/27/2017 136* 70 - 100 mg/dL Final   • BUN 12/27/2017 22  9 - 23 mg/dL Final   • Creatinine 12/27/2017 0.90  0.60 - 1.30 mg/dL Final   • Sodium 12/27/2017 141  132 - 146 mmol/L Final   • Potassium 12/27/2017 4.0  3.5 - 5.5 mmol/L Final   • Chloride 12/27/2017 108  99 - 109 mmol/L Final   • CO2 12/27/2017 26.0  20.0 - 31.0 mmol/L " Final   • Calcium 12/27/2017 8.4* 8.7 - 10.4 mg/dL Final   • Total Protein 12/27/2017 5.7  5.7 - 8.2 g/dL Final   • Albumin 12/27/2017 3.90  3.20 - 4.80 g/dL Final   • ALT (SGPT) 12/27/2017 34  7 - 40 U/L Final   • AST (SGOT) 12/27/2017 30  0 - 33 U/L Final   • Alkaline Phosphatase 12/27/2017 71  25 - 100 U/L Final   • Total Bilirubin 12/27/2017 0.6  0.3 - 1.2 mg/dL Final   • eGFR Non African Amer 12/27/2017 83  >60 mL/min/1.73 Final   • Globulin 12/27/2017 1.8  gm/dL Final   • A/G Ratio 12/27/2017 2.2  1.5 - 2.5 g/dL Final   • BUN/Creatinine Ratio 12/27/2017 24.4  7.0 - 25.0 Final   • Anion Gap 12/27/2017 7.0  3.0 - 11.0 mmol/L Final   • WBC 12/27/2017 3.50  3.50 - 10.80 10*3/mm3 Final   • RBC 12/27/2017 4.39  4.20 - 5.76 10*6/mm3 Final   • Hemoglobin 12/27/2017 13.7  13.1 - 17.5 g/dL Final   • Hematocrit 12/27/2017 43.4  38.9 - 50.9 % Final   • RDW 12/27/2017 18.6* 11.3 - 14.5 % Final   • MCV 12/27/2017 98.8  80.0 - 99.0 fL Final   • MCH 12/27/2017 31.1* 27.0 - 31.0 pg Final   • MCHC 12/27/2017 31.5* 32.0 - 36.0 g/dL Final   • MPV 12/27/2017 8.0  6.0 - 12.0 fL Final   • Platelets 12/27/2017 100* 150 - 450 10*3/mm3 Final   • Neutrophil % 12/27/2017 55.6  41.0 - 71.0 % Final   • Lymphocyte % 12/27/2017 36.3  24.0 - 44.0 % Final   • Monocyte % 12/27/2017 8.1  0.0 - 12.0 % Final   • Neutrophils, Absolute 12/27/2017 1.90  1.50 - 8.30 10*3/mm3 Final   • Lymphocytes, Absolute 12/27/2017 1.30  0.60 - 4.80 10*3/mm3 Final   • Monocytes, Absolute 12/27/2017 0.30  0.00 - 1.00 10*3/mm3 Final        No results found.    ASSESSMENT: The patient is a very pleasant 71-year-old gentleman with new  diagnosis of multiple myeloma.    PROBLEM LIST:  1. Multiple myeloma, kappa restricted, M-spike 2.4 at diagnosis, ISS score  stage 2 with normal albumin but beta-2 microglobulin level of 4.1.   2. 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   3. Completed 6 cycles of Revlimid,  Velcade, and dexamethasone from 12/09/2013  until 04/07/2014.  4. Received palliative radiation to T11, 4 treatments, completed 12/11/2013.   5. Status post autologous stem cell transplant done at [facility]  with Dr. Bry Johnson 05/13/2014.  6. Relapsed disease documented with elevated kappa lambda ratio with minute  amount of monoclonal protein done on 06/09/2015.   7. Bilateral pulmonary embolism, deep venous thrombosis 07/2014 on chronic  anticoagulation with Coumadin.  8. Chronic kidney disease Stage II.   9. 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   10. Started Revlimid 25 mg p.o. daily 2 weeks on and 1 week off on 06/16/2015.  11. Switched to Ninlaro on 02/21/2017 Secondary to increase in SFL ratio.  12. Added Decadron 40 mg weekly with Revlimid 25 mg 2 weeks on 1 week off on 4/14/2017 secondary to rising SFL ration on Ninlaro alone.   13. Partial seizure disorder.   14. Leukopenia and thrombocytopenia.   15. Peripheral neuropathy  16.  Drug induced hepatitis    PLAN:  1. I did go over the results in detail with the patient. His hemoglobin and hematocrit are normal. He continues to have mild thrombocytopenia and leukopenia, but this is stable.   2.  The patient's liver enzymes and kidney function continue to be normal.   3. We will continue Decadron 20 mg weekly with Revlimid 25 mg 2 weeks on 1 week off. I will continue Ninlaro 4 mg weekly 3 weeks on and 1 week off.   4. The patient will come back to see me in 6 weeks with repeat blood work  prior to return, including serum free light chains.   5. We will continue folic acid daily. I am suspecting his leukopenia and thrombocytopenia is secondary to Depakote and Keppra.   6. I will continue warfarin for history of DVT with monoclonal gammopathy.   7.  The patient is still on Neurontin for peripheral neuropathy. He will follow up with his neurologist regarding management.    8.  I reviewed the potential side effects of Ninlaro and Revlimid including but not limited to peripheral neuropathy, edema, rash, diarrhea, low blood counts,hepatic insufficiency, and potential death.   9.  The patient will continue to follow up with Dr. Reyes at [facility] regarding whether the patient may be a candidate for a second autologous stem cell transplant.  10.  I will continue the patient on Bactrim DS twice a day on Saturdays and Sundays for prophylaxis since patient was given high-dose steroids.  11.  I will continue Protonix 40 mg daily for GI prophylaxis.  I will refill it today.  12.  The patient will continue Zometa 4 mg IV every 6 weeks with calcium and vitamin D due to mild diffuse lytic lesions seen on skeletal survey done 2/21/2017.    13.  I will continue patient on tramadol 50 mg daily at bedtime as needed for cancer-related pain.    Scribed for Rosalie Segovia MD by CHRISTY Cabrera. 1/2/2018  1:48 PM  Rosalie Segovia MD  1/2/2018 1/2/2018  1:48 PM  I, Rosalie Segovia MD, personally performed the services described in this documentation as scribed by the above named individual in my presence, and it is both accurate and complete.  1/2/2018  1:48 PM

## 2018-01-04 NOTE — TELEPHONE ENCOUNTER
TANK left advising patient myeloma labs elevated, and Dr Segovia wants him to recheck this 1 week before his next follow up appt

## 2018-02-13 NOTE — PROGRESS NOTES
DATE OF VISIT: 2/13/2018    REASON FOR VISIT: Followup for multiple myeloma, M-spike at diagnosis 2.4,  kappa restricted, with diffuse lytic lesions.      HISTORY OF PRESENT ILLNESS: The patient is a very pleasant 70-year-old  gentleman with past medical history significant for multiple myeloma diagnosed  11/26/2013. The patient presented with back pain, had a CAT scan done that  showed multiple vertebral body fractures with lytic lesions. He had a T11  biopsy done that came back with plasmocytosis compatible with multiple myeloma.  The patient was referred to me on 12/03/2013. I did a bone marrow biopsy that  came back consistent with multiple myeloma. Cytogenics at this point are still  pending. Whole body bone survey showed multiple bony lytic lesions affecting  right humerus, vertebral bodies, as well as pelvic bones. The patient had  serum protein electrophoresis done at diagnosis that showed monoclonal protein  of 2.5 grams plus 0.5 grams, kappa restricted. 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 The  patient was started on Revlimid 1.2 mg/sq m subcu once weekly 2 weeks on and 1  week off, Velcade 25 mg p.o. daily 2 weeks on and 1 week off, and dexamethasone  40 mg p.o. weekly on 12/09/2013. The patient completed cycle #6 on 04/07/2014.  The patient received autologous stem cell transplant with Dr. Bry Johnson at  [facility] on 05/13/2014. His day 100 was 08/26/2014. He had serum  protein electrophoresis that failed to show any evidence of monoclonal protein,  serum free light chain ratio was slightly elevated. The patient was started on  Revlimid 15 mg p.o. daily 2 weeks on and 1 week off on 06/16/2015. The patient's therapy was changed to Ninlaro 4 mg weekly 3 weeks on and 1 week off due to rising kappa lambda ratio. His kappa lambda ration continued rise of Ninlaro and he was started on Decadron 40 mg weekly with Revlimid 25 mg 2  weeks on and 1 week off on 4/14/2017.  The patient is here today in scheduled followup visit.    SUBJECTIVE: Mr. Ramirez is here today by himself.  He is trying to be compliant with his treatment however he did miss a couple of doses of Ninlaro.  He has mild chronic fatigue.  Numbness is stable.  He continued to have stable joint pain.    PAST MEDICAL HISTORY/SOCIAL HISTORY/FAMILY HISTORY: Unchanged from my prior documentation done on 12/03/2013.    Review of Systems   Constitutional: Positive for fatigue. Negative for activity change, appetite change, chills, fever and unexpected weight change.   HENT: Negative for hearing loss, mouth sores, nosebleeds, sore throat and trouble swallowing.    Eyes: Negative for visual disturbance.   Respiratory: Negative for cough, chest tightness, shortness of breath and wheezing.    Cardiovascular: Negative for chest pain, palpitations and leg swelling.   Gastrointestinal: Negative for abdominal distention, abdominal pain, blood in stool, constipation, diarrhea, nausea, rectal pain and vomiting.   Endocrine: Negative for cold intolerance and heat intolerance.   Genitourinary: Negative for difficulty urinating, dysuria, frequency and urgency.   Musculoskeletal: Positive for arthralgias. Negative for back pain, gait problem, joint swelling and myalgias.   Skin: Negative for rash.   Neurological: Positive for numbness. Negative for dizziness, tremors, syncope, weakness, light-headedness and headaches.   Hematological: Negative for adenopathy. Does not bruise/bleed easily.   Psychiatric/Behavioral: Negative for confusion, sleep disturbance and suicidal ideas. The patient is not nervous/anxious.          Current Outpatient Prescriptions:   •  aspirin 81 MG tablet, Take 81 mg by mouth Daily., Disp: , Rfl:   •  azelastine (ASTELIN) 0.1 % nasal spray, into each nostril 2 (two) times a day., Disp: , Rfl:   •  baclofen (LIORESAL) 10 MG tablet, TK 1 - 2 TS QHS, Disp: , Rfl: 2  •  benzonatate  (TESSALON) 200 MG capsule, TK ONE C PO  TID FOR 5 DAYS, Disp: , Rfl: 0  •  Calcium Carb-Cholecalciferol (CALCIUM + D3) 600-200 MG-UNIT tablet, Take  by mouth., Disp: , Rfl:   •  dexamethasone (DECADRON) 4 MG tablet, TAKE 10 TABLETS BY MOUTH WITH BREAKFAST, ON DAYS 1, 8, 15 AND 22, Disp: 40 tablet, Rfl: 5  •  docusate sodium (DOCQLACE) 100 MG capsule, Take  by mouth., Disp: , Rfl:   •  folic acid (FOLVITE) 1 MG tablet, TK 1 T PO QD, Disp: , Rfl: 5  •  gabapentin (NEURONTIN) 300 MG capsule, TAKE 1 CAPSULE BY MOUTH THREE TIMES DAILY, Disp: 90 capsule, Rfl: 1  •  Glucosamine-Chondroitin (OSTEO BI-FLEX REGULAR STRENGTH) 250-200 MG tablet, Take 1 tablet by mouth 2 (Two) Times a Day., Disp: , Rfl:   •  lenalidomide (REVLIMID) 25 MG capsule, Take 1 capsule by mouth Daily. Daily for 14 days on, 7 days off. AUTH: [id number] Adult Male, Disp: 14 capsule, Rfl: 0  •  levETIRAcetam (KEPPRA) 1000 MG tablet, Take 1 tablet by mouth Daily. PATIENT TAKING 1.5 TAB TWICE A DAY (Patient taking differently: Take 500 mg by mouth 2 (Two) Times a Day. PATIENT TAKING 1/2 TAB TWICE A DAY), Disp: 90 tablet, Rfl: 6  •  lidocaine (LMX) 4 % cream, Apply  topically As Needed for Mild Pain ., Disp: , Rfl:   •  loratadine (CLARITIN) 10 MG tablet, Take  by mouth., Disp: , Rfl:   •  methylphenidate (CONCERTA) 36 MG CR tablet, Take 18 mg by mouth Every Morning., Disp: , Rfl:   •  NINLARO 4 MG capsule, Take 4 mg by mouth 1 (One) Time Per Week. 3 weeks on, 1 week off, Disp: 9 capsule, Rfl: 5  •  ondansetron (ZOFRAN) 8 MG tablet, Take 1 tablet by mouth 3 (Three) Times a Day As Needed for Nausea or Vomiting., Disp: 30 tablet, Rfl: 5  •  pantoprazole (PROTONIX) 40 MG EC tablet, Take 1 tablet by mouth Daily., Disp: 30 tablet, Rfl: 5  •  Probiotic Product (PROBIOTIC PO), Take 1 tablet by mouth 2 (Two) Times a Day As Needed., Disp: , Rfl:   •  sulfamethoxazole-trimethoprim (BACTRIM DS,SEPTRA DS) 800-160 MG per tablet, Take 1 tablet by mouth 2 (Two) Times a Day.  "One tablet twice a day on saturdays and sundays for prophylaxis, Disp: 60 tablet, Rfl: 3  •  Valerian Root 450 MG capsule, Take  by mouth., Disp: , Rfl:   •  vitamin B-12 (CYANOCOBALAMIN) 500 MCG tablet, Take 500 mcg by mouth Daily., Disp: , Rfl:   •  warfarin (COUMADIN) 5 MG tablet, Take 5 mg by mouth As Needed., Disp: , Rfl:     PHYSICAL EXAMINATION:   /74 Comment: LUE  Pulse 87  Temp 97.8 °F (36.6 °C) (Temporal Artery )   Resp 20  Ht 177.8 cm (70\")  Wt (!) 138 kg (304 lb)  BMI 43.62 kg/m2   ECOG Performance Status: 1 - Symptomatic but completely ambulatory  General Appearance:  alert, cooperative, no apparent distress and appears stated age   Neurologic/Psychiatric: A&O x 3, gait steady, appropriate affect, strength 5/5 in all muscle groups   HEENT:  Normocephalic, without obvious abnormality, mucous membranes moist   Neck: Supple, symmetrical, trachea midline, no adenopathy;  No thyromegaly, masses, or tenderness   Lungs:   Clear to auscultation bilaterally; respirations regular, even, and unlabored bilaterally   Heart:  Regular rate and rhythm, no murmurs appreciated   Abdomen:   Soft, non-tender, non-distended and no organomegaly   Lymph nodes: No cervical, supraclavicular, inguinal or axillary adenopathy noted   Extremities: Normal, atraumatic; no clubbing, cyanosis, or edema    Skin: No rashes, ulcers, or suspicious lesions noted     Lab on 02/06/2018   Component Date Value Ref Range Status   • Glucose 02/06/2018 206* 70 - 100 mg/dL Final   • BUN 02/06/2018 25* 9 - 23 mg/dL Final   • Creatinine 02/06/2018 0.90  0.60 - 1.30 mg/dL Final   • Sodium 02/06/2018 138  132 - 146 mmol/L Final   • Potassium 02/06/2018 4.2  3.5 - 5.5 mmol/L Final   • Chloride 02/06/2018 111* 99 - 109 mmol/L Final   • CO2 02/06/2018 21.0  20.0 - 31.0 mmol/L Final   • Calcium 02/06/2018 8.7  8.7 - 10.4 mg/dL Final   • Total Protein 02/06/2018 5.7  5.7 - 8.2 g/dL Final   • Albumin 02/06/2018 4.00  3.20 - 4.80 g/dL Final   • " ALT (SGPT) 02/06/2018 30  7 - 40 U/L Final   • AST (SGOT) 02/06/2018 20  0 - 33 U/L Final   • Alkaline Phosphatase 02/06/2018 93  25 - 100 U/L Final   • Total Bilirubin 02/06/2018 0.4  0.3 - 1.2 mg/dL Final   • eGFR Non African Amer 02/06/2018 83  >60 mL/min/1.73 Final   • Globulin 02/06/2018 1.7  gm/dL Final   • A/G Ratio 02/06/2018 2.4  1.5 - 2.5 g/dL Final   • BUN/Creatinine Ratio 02/06/2018 27.8* 7.0 - 25.0 Final   • Anion Gap 02/06/2018 6.0  3.0 - 11.0 mmol/L Final   • Free Light Chain, Kappa 02/06/2018 221.3* 3.3 - 19.4 mg/L Final   • Free Lambda Light Chains 02/06/2018 6.2  5.7 - 26.3 mg/L Final   • Kappa/Lambda Ratio 02/06/2018 35.69* 0.26 - 1.65 Final   • WBC 02/06/2018 6.60  3.50 - 10.80 10*3/mm3 Final   • RBC 02/06/2018 4.19* 4.20 - 5.76 10*6/mm3 Final   • Hemoglobin 02/06/2018 13.0* 13.1 - 17.5 g/dL Final   • Hematocrit 02/06/2018 41.0  38.9 - 50.9 % Final   • RDW 02/06/2018 18.0* 11.3 - 14.5 % Final   • MCV 02/06/2018 97.6  80.0 - 99.0 fL Final   • MCH 02/06/2018 31.0  27.0 - 31.0 pg Final   • MCHC 02/06/2018 31.7* 32.0 - 36.0 g/dL Final   • MPV 02/06/2018 9.0  6.0 - 12.0 fL Final   • Platelets 02/06/2018 94* 150 - 450 10*3/mm3 Final   • Neutrophil % 02/06/2018 83.2* 41.0 - 71.0 % Final   • Lymphocyte % 02/06/2018 12.3* 24.0 - 44.0 % Final   • Monocyte % 02/06/2018 4.5  0.0 - 12.0 % Final   • Neutrophils, Absolute 02/06/2018 5.50  1.50 - 8.30 10*3/mm3 Final   • Lymphocytes, Absolute 02/06/2018 0.80  0.60 - 4.80 10*3/mm3 Final   • Monocytes, Absolute 02/06/2018 0.30  0.00 - 1.00 10*3/mm3 Final        No results found.    ASSESSMENT: The patient is a very pleasant 71-year-old gentleman with new  diagnosis of multiple myeloma.    PROBLEM LIST:  1. Multiple myeloma, kappa restricted, M-spike 2.4 at diagnosis, ISS score  stage 2 with normal albumin but beta-2 microglobulin level of 4.1.   2. 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   3. Completed 6  cycles of Revlimid, Velcade, and dexamethasone from 12/09/2013  until 04/07/2014.  4. Received palliative radiation to T11, 4 treatments, completed 12/11/2013.   5. Status post autologous stem cell transplant done at [facility]  with Dr. Bry Johnson 05/13/2014.  6. Relapsed disease documented with elevated kappa lambda ratio with minute  amount of monoclonal protein done on 06/09/2015.   7. Bilateral pulmonary embolism, deep venous thrombosis 07/2014 on chronic  anticoagulation with Coumadin.  8. Chronic kidney disease Stage II.   9. 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   10. Started Revlimid 25 mg p.o. daily 2 weeks on and 1 week off on 06/16/2015.  11. Switched to Ninlaro on 02/21/2017 Secondary to increase in SFL ratio.  12. Added Decadron 40 mg weekly with Revlimid 25 mg 2 weeks on 1 week off on 4/14/2017 secondary to rising SFL ration on Ninlaro alone.   13. Partial seizure disorder.   14. Leukopenia and thrombocytopenia.   15. Peripheral neuropathy  16.  Drug induced hepatitis    PLAN:  1. I did go over the results in detail with the patient.  His serum free light chain ratio is better.  He continued to have mild anemia as well as thrombocytopenia.   2.  The patient's liver enzymes and kidney function continue to be normal.   3. We will continue Decadron 20 mg weekly with Revlimid 25 mg 2 weeks on 1 week off. I will continue Ninlaro 4 mg weekly 3 weeks on and 1 week off.   4. The patient will come back to see me in 6 weeks with repeat blood work  prior to return, including serum free light chains.   5. We will continue folic acid daily. I am suspecting his leukopenia and thrombocytopenia is secondary to Depakote and Keppra.   6. I will continue warfarin for history of DVT with monoclonal gammopathy.   7.  The patient is still on Neurontin for peripheral neuropathy. He will follow up with his neurologist regarding  management.   8.  I reviewed the potential side effects of Ninlaro and Revlimid including but not limited to peripheral neuropathy, edema, rash, diarrhea, low blood counts,hepatic insufficiency, and potential death.   9.  The patient will continue to follow up with Dr. Reyes at [facility] regarding whether the patient may be a candidate for a second autologous stem cell transplant.  10.  I will continue the patient on Bactrim DS twice a day on Saturdays and Sundays for prophylaxis since patient was given high-dose steroids.  11.  I will continue Protonix 40 mg daily for GI prophylaxis.  I will refill it today.  12.  The patient will continue Zometa 4 mg IV every 6 weeks with calcium and vitamin D due to mild diffuse lytic lesions seen on skeletal survey done 2/21/2017.    13.  I will continue patient on tramadol 50 mg daily at bedtime as needed for cancer-related pain.    Rosalie Segovia MD  2/13/2018  8:55 AM

## 2018-03-27 NOTE — PROGRESS NOTES
Received notification from clinic plan for patient to transition to Carflizomib + Lenalidomide 25mg PO daily x 21 days, followed by 7 days off + Dex 20mg PO weekly. Submitted new script with updated instructions for Lenalidomide to Trini MEJIA Pt scheduled for first Carflizomib on 4/3/18.

## 2018-03-27 NOTE — PROGRESS NOTES
DATE OF VISIT: 3/27/2018    REASON FOR VISIT: Followup for multiple myeloma, M-spike at diagnosis 2.4,  kappa restricted, with diffuse lytic lesions.      HISTORY OF PRESENT ILLNESS: The patient is a very pleasant 70-year-old  gentleman with past medical history significant for multiple myeloma diagnosed  11/26/2013. The patient presented with back pain, had a CAT scan done that  showed multiple vertebral body fractures with lytic lesions. He had a T11  biopsy done that came back with plasmocytosis compatible with multiple myeloma.  The patient was referred to me on 12/03/2013. I did a bone marrow biopsy that  came back consistent with multiple myeloma. Cytogenics at this point are still  pending. Whole body bone survey showed multiple bony lytic lesions affecting  right humerus, vertebral bodies, as well as pelvic bones. The patient had  serum protein electrophoresis done at diagnosis that showed monoclonal protein  of 2.5 grams plus 0.5 grams, kappa restricted. 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 The  patient was started on Revlimid 1.2 mg/sq m subcu once weekly 2 weeks on and 1  week off, Velcade 25 mg p.o. daily 2 weeks on and 1 week off, and dexamethasone  40 mg p.o. weekly on 12/09/2013. The patient completed cycle #6 on 04/07/2014.  The patient received autologous stem cell transplant with Dr. Bry Johnson at  [facility] on 05/13/2014. His day 100 was 08/26/2014. He had serum  protein electrophoresis that failed to show any evidence of monoclonal protein,  serum free light chain ratio was slightly elevated. The patient was started on  Revlimid 15 mg p.o. daily 2 weeks on and 1 week off on 06/16/2015. The patient's therapy was changed to Ninlaro 4 mg weekly 3 weeks on and 1 week off due to rising kappa lambda ratio. His kappa lambda ration continued rise of Ninlaro and he was started on Decadron 40 mg weekly with Revlimid 25 mg 2  weeks on and 1 week off on 4/14/2017.  The patient is here today for a scheduled followup visit.    SUBJECTIVE: Mr. Ramirez is here today by himself.  He has mild chronic fatigue.  Numbness is stable.  He continued to have stable joint pain.  He is complaining of weight gain.    PAST MEDICAL HISTORY/SOCIAL HISTORY/FAMILY HISTORY: Unchanged from my prior documentation done on 12/03/2013.    Review of Systems   Constitutional: Positive for fatigue. Negative for activity change, appetite change, chills, fever and unexpected weight change.   HENT: Negative for hearing loss, mouth sores, nosebleeds, sore throat and trouble swallowing.    Eyes: Negative for visual disturbance.   Respiratory: Negative for cough, chest tightness, shortness of breath and wheezing.    Cardiovascular: Negative for chest pain, palpitations and leg swelling.   Gastrointestinal: Negative for abdominal distention, abdominal pain, blood in stool, constipation, diarrhea, nausea, rectal pain and vomiting.   Endocrine: Negative for cold intolerance and heat intolerance.   Genitourinary: Negative for difficulty urinating, dysuria, frequency and urgency.   Musculoskeletal: Positive for arthralgias. Negative for back pain, gait problem, joint swelling and myalgias.   Skin: Negative for rash.   Neurological: Positive for numbness. Negative for dizziness, tremors, syncope, weakness, light-headedness and headaches.   Hematological: Negative for adenopathy. Does not bruise/bleed easily.   Psychiatric/Behavioral: Negative for confusion, sleep disturbance and suicidal ideas. The patient is not nervous/anxious.          Current Outpatient Prescriptions:   •  aspirin 81 MG tablet, Take 81 mg by mouth Daily., Disp: , Rfl:   •  azelastine (ASTELIN) 0.1 % nasal spray, into each nostril 2 (two) times a day., Disp: , Rfl:   •  baclofen (LIORESAL) 10 MG tablet, TK 1 - 2 TS QHS, Disp: , Rfl: 2  •  benzonatate (TESSALON) 200 MG capsule, TK ONE C PO  TID FOR 5 DAYS,  Disp: , Rfl: 0  •  Calcium Carb-Cholecalciferol (CALCIUM + D3) 600-200 MG-UNIT tablet, Take  by mouth., Disp: , Rfl:   •  dexamethasone (DECADRON) 4 MG tablet, TAKE 10 TABLETS BY MOUTH WITH BREAKFAST, ON DAYS 1, 8, 15 AND 22, Disp: 40 tablet, Rfl: 5  •  docusate sodium (DOCQLACE) 100 MG capsule, Take  by mouth., Disp: , Rfl:   •  folic acid (FOLVITE) 1 MG tablet, TK 1 T PO QD, Disp: , Rfl: 5  •  gabapentin (NEURONTIN) 300 MG capsule, TAKE 1 CAPSULE BY MOUTH THREE TIMES DAILY, Disp: 90 capsule, Rfl: 1  •  Glucosamine-Chondroitin (OSTEO BI-FLEX REGULAR STRENGTH) 250-200 MG tablet, Take 1 tablet by mouth 2 (Two) Times a Day., Disp: , Rfl:   •  lenalidomide (REVLIMID) 25 MG capsule, Take 1 capsule by mouth Daily. Daily for 14 days on, 7 days off. AUTH:[id number]   Adult Male, Disp: 14 capsule, Rfl: 0  •  levETIRAcetam (KEPPRA) 1000 MG tablet, Take 1 tablet by mouth Daily. PATIENT TAKING 1.5 TAB TWICE A DAY (Patient taking differently: Take 500 mg by mouth 2 (Two) Times a Day. PATIENT TAKING 1/2 TAB TWICE A DAY), Disp: 90 tablet, Rfl: 6  •  lidocaine (LMX) 4 % cream, Apply  topically As Needed for Mild Pain ., Disp: , Rfl:   •  loratadine (CLARITIN) 10 MG tablet, Take  by mouth., Disp: , Rfl:   •  methylphenidate (CONCERTA) 36 MG CR tablet, Take 18 mg by mouth Every Morning., Disp: , Rfl:   •  NINLARO 4 MG capsule, Take 4 mg by mouth 1 (One) Time Per Week. 3 weeks on, 1 week off, Disp: 9 capsule, Rfl: 5  •  ondansetron (ZOFRAN) 8 MG tablet, Take 1 tablet by mouth 3 (Three) Times a Day As Needed for Nausea or Vomiting., Disp: 30 tablet, Rfl: 5  •  pantoprazole (PROTONIX) 40 MG EC tablet, Take 1 tablet by mouth Daily., Disp: 30 tablet, Rfl: 5  •  phosphorus (K PHOS NEUTRAL) 155-852-130 MG tablet, Take 1 tablet by mouth Daily., Disp: 30 tablet, Rfl: 2  •  Probiotic Product (PROBIOTIC PO), Take 1 tablet by mouth 2 (Two) Times a Day As Needed., Disp: , Rfl:   •  sulfamethoxazole-trimethoprim (BACTRIM DS,SEPTRA DS) 800-160 MG per  "tablet, Take 1 tablet by mouth 2 (Two) Times a Day. One tablet twice a day on saturdays and sundays for prophylaxis, Disp: 60 tablet, Rfl: 3  •  Valerian Root 450 MG capsule, Take  by mouth., Disp: , Rfl:   •  vitamin B-12 (CYANOCOBALAMIN) 500 MCG tablet, Take 500 mcg by mouth Daily., Disp: , Rfl:   •  warfarin (COUMADIN) 5 MG tablet, Take 5 mg by mouth As Needed., Disp: , Rfl:     PHYSICAL EXAMINATION:   /67   Pulse 98   Temp 97.9 °F (36.6 °C)   Resp 22   Ht 177.8 cm (70\")   Wt (!) 137 kg (301 lb)   SpO2 96%   BMI 43.19 kg/m²    ECOG Performance Status: 1 - Symptomatic but completely ambulatory  General Appearance:  alert, cooperative, no apparent distress and appears stated age   Neurologic/Psychiatric: A&O x 3, gait steady, appropriate affect, strength 5/5 in all muscle groups   HEENT:  Normocephalic, without obvious abnormality, mucous membranes moist   Neck: Supple, symmetrical, trachea midline, no adenopathy;  No thyromegaly, masses, or tenderness   Lungs:   Clear to auscultation bilaterally; respirations regular, even, and unlabored bilaterally   Heart:  Regular rate and rhythm, no murmurs appreciated   Abdomen:   Soft, non-tender, non-distended and no organomegaly   Lymph nodes: No cervical, supraclavicular, inguinal or axillary adenopathy noted   Extremities: Normal, atraumatic; no clubbing, cyanosis, or edema    Skin: No rashes, ulcers, or suspicious lesions noted     Lab on 03/22/2018   Component Date Value Ref Range Status   • Glucose 03/22/2018 113* 70 - 100 mg/dL Final   • BUN 03/22/2018 23  9 - 23 mg/dL Final   • Creatinine 03/22/2018 0.90  0.60 - 1.30 mg/dL Final   • Sodium 03/22/2018 143  132 - 146 mmol/L Final   • Potassium 03/22/2018 4.6  3.5 - 5.5 mmol/L Final   • Chloride 03/22/2018 106  99 - 109 mmol/L Final   • CO2 03/22/2018 25.0  20.0 - 31.0 mmol/L Final   • Calcium 03/22/2018 8.5* 8.7 - 10.4 mg/dL Final   • Total Protein 03/22/2018 5.6* 5.7 - 8.2 g/dL Final   • Albumin " 03/22/2018 3.90  3.20 - 4.80 g/dL Final   • ALT (SGPT) 03/22/2018 33  7 - 40 U/L Final   • AST (SGOT) 03/22/2018 24  0 - 33 U/L Final   • Alkaline Phosphatase 03/22/2018 76  25 - 100 U/L Final   • Total Bilirubin 03/22/2018 0.4  0.3 - 1.2 mg/dL Final   • eGFR Non African Amer 03/22/2018 83  >60 mL/min/1.73 Final   • Globulin 03/22/2018 1.7  gm/dL Final   • A/G Ratio 03/22/2018 2.3  1.5 - 2.5 g/dL Final   • BUN/Creatinine Ratio 03/22/2018 25.6* 7.0 - 25.0 Final   • Anion Gap 03/22/2018 12.0* 3.0 - 11.0 mmol/L Final   • Free Light Chain, Kappa 03/23/2018 913.6* 3.3 - 19.4 mg/L Final   • Free Lambda Light Chains 03/23/2018 4.3* 5.7 - 26.3 mg/L Final   • Kappa/Lambda Ratio 03/23/2018 212.47* 0.26 - 1.65 Final   • WBC 03/22/2018 3.30* 3.50 - 10.80 10*3/mm3 Final   • RBC 03/22/2018 3.97* 4.20 - 5.76 10*6/mm3 Final   • Hemoglobin 03/22/2018 12.4* 13.1 - 17.5 g/dL Final   • Hematocrit 03/22/2018 38.6* 38.9 - 50.9 % Final   • RDW 03/22/2018 18.5* 11.3 - 14.5 % Final   • MCV 03/22/2018 97.3  80.0 - 99.0 fL Final   • MCH 03/22/2018 31.2* 27.0 - 31.0 pg Final   • MCHC 03/22/2018 32.0  32.0 - 36.0 g/dL Final   • MPV 03/22/2018 8.2  6.0 - 12.0 fL Final   • Platelets 03/22/2018 76* 150 - 450 10*3/mm3 Final   • Neutrophil % 03/22/2018 66.0  41.0 - 71.0 % Final   • Lymphocyte % 03/22/2018 27.5  24.0 - 44.0 % Final   • Monocyte % 03/22/2018 6.5  0.0 - 12.0 % Final   • Neutrophils, Absolute 03/22/2018 2.20  1.50 - 8.30 10*3/mm3 Final   • Lymphocytes, Absolute 03/22/2018 0.90  0.60 - 4.80 10*3/mm3 Final   • Monocytes, Absolute 03/22/2018 0.20  0.00 - 1.00 10*3/mm3 Final        No results found.    ASSESSMENT: The patient is a very pleasant 71-year-old gentleman with new  diagnosis of multiple myeloma.    PROBLEM LIST:  1. Multiple myeloma, kappa restricted, M-spike 2.4 at diagnosis, ISS score  stage 2 with normal albumin but beta-2 microglobulin level of 4.1.   2. 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   3. Completed 6 cycles of Revlimid, Velcade, and dexamethasone from 12/09/2013  until 04/07/2014.  4. Received palliative radiation to T11, 4 treatments, completed 12/11/2013.   5. Status post autologous stem cell transplant done at [facility]  with Dr. rBy Johnson 05/13/2014.  6. Relapsed disease documented with elevated kappa lambda ratio with minute  amount of monoclonal protein done on 06/09/2015.   7. Bilateral pulmonary embolism, deep venous thrombosis 07/2014 on chronic  anticoagulation with Coumadin.  8. Chronic kidney disease Stage II.   9. 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   10. Started Revlimid 25 mg p.o. daily 2 weeks on and 1 week off on 06/16/2015.  11. Switched to Ninlaro on 02/21/2017 Secondary to increase in SFL ratio.  12. Added Decadron 40 mg weekly with Revlimid 25 mg 2 weeks on 1 week off on 4/14/2017 secondary to rising SFL ration on Ninlaro alone.   13. Partial seizure disorder.   14. Leukopenia and thrombocytopenia.   15. Peripheral neuropathy  16.  Drug induced hepatitis    PLAN:  1. I did go over the results in detail with the patient.  His serum free light chain ratio is significantly worse.  He continued to have mild anemia as well as thrombocytopenia.   2.  The patient's liver enzymes and kidney function continue to be normal.   3. We will switch his treatment to Decadron 20 mg weekly with Revlimid 25 mg 3 weeks on 1 week off, with Kyprolis 2 consecutive days weekly 3 weeks on and 1 week off.   4. The patient will come back to see me in 2 weeks with repeat blood work  prior to return, including serum free light chains.   5. We will continue folic acid daily. I am suspecting his leukopenia and thrombocytopenia is secondary to Depakote and Keppra.   6. I will continue warfarin for history of DVT with monoclonal gammopathy.   7.  The patient is still on  Neurontin for peripheral neuropathy. He will follow up with his neurologist regarding management.   8.  I reviewed the potential side effects of Kyprolis and Revlimid including but not limited to peripheral neuropathy, edema, rash, diarrhea, low blood counts,hepatic insufficiency, and potential death.   9.  The patient will continue to follow up with Dr. Reyes at [facility] regarding whether the patient may be a candidate for a second autologous stem cell transplant.  10.  I will continue the patient on Bactrim DS twice a day on Saturdays and Sundays for prophylaxis since patient was given high-dose steroids.  11.  I will continue Protonix 40 mg daily for GI prophylaxis.  I will refill it today.  12.  The patient will continue Zometa 4 mg IV every 6 weeks with calcium and vitamin D due to mild diffuse lytic lesions seen on skeletal survey done 2/21/2017.    13.  I will continue patient on tramadol 50 mg daily at bedtime as needed for cancer-related pain.  14.  I'll start patient on Acyclovir 400 mg twice a day for HSV prophylaxis.    Rosalie Segovia MD  3/27/2018  9:57 AM

## 2018-03-27 NOTE — PLAN OF CARE
Outpatient Infusion • [address] • [address] • [place] [id number] • [phone]      CHEMOTHERAPY EDUCATION SHEET    NAME:  Johnny Ramirez      : 1945           DATE: 18    Booklets Given: Chemotherapy and You []  Eating Hints []    Sexuality/Fertility Books []     Chemotherapy/Biotherapy Education Sheets: (list all that apply)  Carfilzomib                                                                                                                                                                Chemotherapy Regimen:  Carfilzomib on days 1,2,8,9,15,16 + Lenalidomide on day 1-21 + Dexamethasone daily every 28 day x8     TOPICS EDUCATION PROVIDED EDUCATION REINFORCED COMMENTS   ANEMIA:  role of RBC, cause, s/s, ways to manage, role of transfusion [x] [] Counseled patient on the method of action of anemia caused by cytotoxic chemotherapy. Counseled patient on the symptoms resulting from anemia. Explained to patient the process of blood tests performed at treatment visits.    THROMBOCYTOPENIA:  role of platelet, cause, s/s, ways to prevent bleeding, things to avoid, when to seek help [x] [] Counseled patient on the mechanism of thrombocytopenia caused by cytotoxic chemotherapy. Counseled patient on the symptoms associated with low platelet counts. Recommended patient to avoid unnecessary activities with increased risk of bleeding. Encouraged patient to call MD with severe bleeding.    NEUTROPENIA:  role of WBC, cause, infection precautions, s/s of infection, when to call MD [x] [] Counseled patient on the mechanism of neutropenia resulting from cytotoxic chemotherapy. Counseled patient on the issues that can arise from decreased immune function. Recommended patient to contact MD with a fever >100.4. Encouraged patient to take reasonable precautions to avoid infection.    NUTRITION & APPETITE CHANGES:  importance of maintaining healthy diet & weight, ways to manage to improve intake,  dietary consult, exercise regimen [] []    DIARRHEA:  causes, s/s of dehydration, ways to manage, dietary changes, when to call MD [x] [] Counseled patient on the likelihood of diarrhea associated with this chemotherapy regimen. Encouraged patient to treat mild diarrhea at home and counseled on the dosing of loperamide. Recommended patient to contact MD with severe diarrhea not responsive to OTC therapy.    CONSTIPATION:  causes, ways to manage, dietary changes, when to call MD [] []    NAUSEA & VOMITING:  cause, use of antiemetics, dietary changes, when to call MD [x] [] Counseled patient on the likelihood of nausea and vomiting with this regimen. Explained the mechanism and role of the antiemetic medications. Counseled patient on the use of at-home antiemetic agents at first sign of nausea.    MOUTH SORES:  causes, oral care, ways to manage [] []    ALOPECIA:  cause, ways to manage, resources [] []    INFERTILITY & SEXUALITY:  causes, fertility preservation options, sexuality changes, ways to manage, importance of birth control [x] [] Counseled patient on the importance of safe sex practices for at least the first 48 hours following administration of chemotherapy.    NERVOUS SYSTEM CHANGES:  causes, s/s, neuropathies, cognitive changes, ways to manage [] []    PAIN:  causes, ways to manage [] [] ????   SKIN & NAIL CHANGES:  cause, s/s, ways to manage [] []    ORGAN TOXICITIES:  cause, s/s, need for diagnostic tests, labs, when to notify MD [x] [] Counseled patient on the likelihood of hepatiotoxicity with this chemotherapy regimen. Explained to patient the purpose of blood tests to monitor organ function.    SURVIVORSHIP:  distress, distress assessment, secondary malignancies, early/late effects, follow-up, social issues, social support [] []    HOME CARE:  use of spill kits, storing of PO chemo, how to manage bodily fluids [x] [] Counseled patient on the disposal and cleaning of soiled sheets/toilets. Explained  the reasoning in preventing others from being exposed to the chemotherapy agents.    MISCELLANEOUS:  drug interactions, administration, vesicant, et [x] [] Counseled patient on the risk of CHF and angina. Counseled patient on the risk dyspnea. Emphasized the importance of hydration at home. Explained hydration given during infusion.      Referrals:        Notes: Patient had no additional questions after counseling. Encouraged patient to reach out to myself or a pharmacist with questions or concerns.     Thanks,  Porter Rocha, PharmD Candidate  Ext. [phone]

## 2018-04-03 NOTE — PROGRESS NOTES
Oral Chemotherapy Teaching      Patient Name/:  Johnny Ramirez   1945  Oral Chemotherapy Regimen:  Carflizomib IV on Day 1,2,8,9,15,16 of a 28 Day Cycle + Dexamethasone 20mg PO weekly + Revlimid 25mg PO x 21 days, followed by 7 days off.     Date Started Medication:   Pt reports continuation of weekly Dex 20mg - Takes on ;   Reports Next Cycle of Revlimid due: 18    Carfilzomib  Cycle 1: 4/3/18    Initial Teaching Follow Up Comments     Safety     Storage instructions (away from children; away from heat/cold, sunlight, or moisture), handling - use of gloves (caregivers), washing hands after touching pills, managing waste     “How are you storing your medications?”, reminders on storage, proper handling (caregivers using gloves, washing hands, away from children, managing waste, etc.), disposal of medication with D/C or dosage change    Pt with prior experience with revlimid. Discussed continue storage and handling precautions. Do not donate blood. Use two forms of birth control.      Adherence      patient and/or caregiver on how to take medication, take with/without food, assess their adherence potential, stress importance of adherence, ways to manage adherence (pill boxes, phone reminders, calendars), what to do if miss a dose   “How are you taking your medication?” “How are you remembering to take your medication?”, “How many doses have you missed?”, determine reasons for non-adherence (not remembering, side effects, etc), ways to improve, overadherence? Remind patient of ways to improve/maintain adherence   Patient seen in infusion this morning prior to first dose of Carfilzomib, discussed treatment regimen and administration of new agent. Pt reports he is currently on Revlimid with next 7 day off scheduled to start on 18. Discussed plan to increase revlimid to 21 days, followed by 7 days off. Reports he has scheduled the next delivery to arrive in time to start next cycle  of revlimid on 4/16/18 after 7 day break. Reports taking dexamethasone on Mondays, updated calendar. Reviewed the use of Acyclovir for HSV ppx. Submitted script to patients local pharmacy per request. Provided with calendar in infusion.      Side Effects/Adverse Reactions      patient on potential side effects, s/s, ways to manage, when to call MD/seek help     Determine if patient experiencing side effects, ways to manage  Discussed the following side effects including but not limited to: HSV reactivation, hear failure, liver dysfunction, fatigue, changes in blood counts, dehydration, and diarrhea.      Miscellaneous     Food interactions, DDIs, financial issues Determine if patient started any new medications since being placed on oral chemo (analyze for DDI) Pt reports setting up shipment for next cycle of revlimid. All questions answered and addressed.      Additional Notes:  Discussed aforementioned material with patient in infusion, aware to call with additional questions or concerns.

## 2018-04-04 NOTE — PROGRESS NOTES
Onc Nutrition     Patient Name:  Johnny Ramirez  YOB: 1945    Diagnosis: multiple myeloma  Treatment: Kyprolis - D1,2,8,9,15,16 / Revlimid - po daily(D1-21) / Dex - po weekly every 28 days + Zometa - every 6 weeks     Weight: 307#  Nutrition Symptoms: none    Met with patient during his chemotherapy infusion appointment.  Note patient with a chemo drug change due to progressive disease.  Patient's biggest nutritional complaint is increased appetite/weight gain with steroids. Note patient has gained 30#+ x 1 year.    Advised patient to be choosing healthy foods and eating well balanced meals with inclusion of high fiber foods, lean meats, healthy fats, and a wide variety of fruits and vegetables.  Encouraged him to avoid high caloric foods with little to no nutritional value.  Also encouraged him to continue to exercise as tolerated.      Answered his questions and he voiced understanding of information discussed.  RD's contact information provided and encouraged him to call with questions.  Will follow up as indicated.    Electronically signed by:  Clara Fairchild RD  04/04/18 3:01 PM

## 2018-04-10 NOTE — PROGRESS NOTES
DATE OF VISIT: 4/10/2018    REASON FOR VISIT: Followup for multiple myeloma, M-spike at diagnosis 2.4,  kappa restricted, with diffuse lytic lesions.      HISTORY OF PRESENT ILLNESS: The patient is a very pleasant 70-year-old  gentleman with past medical history significant for multiple myeloma diagnosed  11/26/2013. The patient presented with back pain, had a CAT scan done that  showed multiple vertebral body fractures with lytic lesions. He had a T11  biopsy done that came back with plasmocytosis compatible with multiple myeloma.  The patient was referred to me on 12/03/2013. I did a bone marrow biopsy that  came back consistent with multiple myeloma. Cytogenics at this point are still  pending. Whole body bone survey showed multiple bony lytic lesions affecting  right humerus, vertebral bodies, as well as pelvic bones. The patient had  serum protein electrophoresis done at diagnosis that showed monoclonal protein  of 2.5 grams plus 0.5 grams, kappa restricted. 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 The  patient was started on Revlimid 1.2 mg/sq m subcu once weekly 2 weeks on and 1  week off, Velcade 25 mg p.o. daily 2 weeks on and 1 week off, and dexamethasone  40 mg p.o. weekly on 12/09/2013. The patient completed cycle #6 on 04/07/2014.  The patient received autologous stem cell transplant with Dr. Bry Johnson at  [facility] on 05/13/2014. His day 100 was 08/26/2014. He had serum  protein electrophoresis that failed to show any evidence of monoclonal protein,  serum free light chain ratio was slightly elevated. The patient was started on  Revlimid 15 mg p.o. daily 2 weeks on and 1 week off on 06/16/2015. The patient's therapy was changed to Ninlaro 4 mg weekly 3 weeks on and 1 week off due to rising kappa lambda ratio. His kappa lambda ration continued rise of Ninlaro and he was started on Decadron 40 mg weekly with Revlimid 25 mg 2  weeks on and 1 week off on 4/14/2017.  His serum free light chain started to increase and treatment was switched to Carfilzumab,Revlimid with dexamethasone , started on April 3, 2018.  The patient is here today for a scheduled followup visit.    SUBJECTIVE: Mr. Ramirez is here today by himself.  He has mild chronic fatigue.  Numbness is stable.  He continued to have stable joint pain.  He is complaining of weight gain.  He had mild shortness of breath after the infusion.    PAST MEDICAL HISTORY/SOCIAL HISTORY/FAMILY HISTORY: Unchanged from my prior documentation done on 12/03/2013.    Review of Systems   Constitutional: Positive for fatigue. Negative for activity change, appetite change, chills, fever and unexpected weight change.   HENT: Negative for hearing loss, mouth sores, nosebleeds, sore throat and trouble swallowing.    Eyes: Negative for visual disturbance.   Respiratory: Negative for cough, chest tightness, shortness of breath and wheezing.    Cardiovascular: Negative for chest pain, palpitations and leg swelling.   Gastrointestinal: Negative for abdominal distention, abdominal pain, blood in stool, constipation, diarrhea, nausea, rectal pain and vomiting.   Endocrine: Negative for cold intolerance and heat intolerance.   Genitourinary: Negative for difficulty urinating, dysuria, frequency and urgency.   Musculoskeletal: Positive for arthralgias. Negative for back pain, gait problem, joint swelling and myalgias.   Skin: Negative for rash.   Neurological: Positive for numbness. Negative for dizziness, tremors, syncope, weakness, light-headedness and headaches.   Hematological: Negative for adenopathy. Does not bruise/bleed easily.   Psychiatric/Behavioral: Negative for confusion, sleep disturbance and suicidal ideas. The patient is not nervous/anxious.          Current Outpatient Prescriptions:   •  cyclobenzaprine (FLEXERIL) 5 MG tablet, Take 5 mg by mouth As Needed for Muscle Spasms., Disp: , Rfl:   •   acyclovir (ZOVIRAX) 400 MG tablet, Take 1 tablet by mouth 2 (Two) Times a Day., Disp: 60 tablet, Rfl: 11  •  aspirin 81 MG tablet, Take 81 mg by mouth Daily., Disp: , Rfl:   •  azelastine (ASTELIN) 0.1 % nasal spray, into each nostril 2 (two) times a day., Disp: , Rfl:   •  baclofen (LIORESAL) 10 MG tablet, TK 1 - 2 TS QHS, Disp: , Rfl: 2  •  Calcium Carb-Cholecalciferol (CALCIUM + D3) 600-200 MG-UNIT tablet, Take  by mouth., Disp: , Rfl:   •  dexamethasone (DECADRON) 4 MG tablet, TAKE 10 TABLETS BY MOUTH WITH BREAKFAST, ON DAYS 1, 8, 15 AND 22, Disp: 40 tablet, Rfl: 5  •  docusate sodium (DOCQLACE) 100 MG capsule, Take  by mouth., Disp: , Rfl:   •  folic acid (FOLVITE) 1 MG tablet, TK 1 T PO QD, Disp: , Rfl: 5  •  gabapentin (NEURONTIN) 300 MG capsule, TAKE 1 CAPSULE BY MOUTH THREE TIMES DAILY, Disp: 90 capsule, Rfl: 1  •  Glucosamine-Chondroitin (OSTEO BI-FLEX REGULAR STRENGTH) 250-200 MG tablet, Take 1 tablet by mouth 2 (Two) Times a Day., Disp: , Rfl:   •  lenalidomide (REVLIMID) 25 MG capsule, Take 1 capsule by mouth Daily for 21 days. Followed by 7 days off. Adult Male REMS: [id number], Disp: 21 capsule, Rfl: 0  •  levETIRAcetam (KEPPRA) 1000 MG tablet, TAKE 1 1/2 TABLET BY MOUTH TWICE DAILY (Patient taking differently: takes half tablet twice daily), Disp: 90 tablet, Rfl: 0  •  lidocaine (LMX) 4 % cream, Apply  topically As Needed for Mild Pain ., Disp: , Rfl:   •  loratadine (CLARITIN) 10 MG tablet, Take  by mouth., Disp: , Rfl:   •  methylphenidate (CONCERTA) 36 MG CR tablet, Take 36 mg by mouth Every Morning  , Disp: , Rfl:   •  pantoprazole (PROTONIX) 40 MG EC tablet, Take 1 tablet by mouth Daily., Disp: 30 tablet, Rfl: 5  •  phosphorus (K PHOS NEUTRAL) 155-852-130 MG tablet, Take 1 tablet by mouth Daily., Disp: 30 tablet, Rfl: 2  •  Probiotic Product (PROBIOTIC PO), Take 1 tablet by mouth 2 (Two) Times a Day As Needed., Disp: , Rfl:   •  sulfamethoxazole-trimethoprim (BACTRIM DS,SEPTRA DS) 800-160 MG per  tablet, TAKE 1 TABLET PO BID ON SATURDAYS AND SUNDAYS FOR PROPHYLAXIS, Disp: , Rfl: 3  •  Valerian Root 450 MG capsule, Take  by mouth., Disp: , Rfl:   •  vitamin B-12 (CYANOCOBALAMIN) 500 MCG tablet, Take 500 mcg by mouth Daily., Disp: , Rfl:   •  warfarin (COUMADIN) 5 MG tablet, Take 5 mg by mouth As Needed., Disp: , Rfl:     PHYSICAL EXAMINATION:   /82   Pulse 85   Temp 97.4 °F (36.3 °C) (Temporal Artery )   Resp 18   Wt 136 kg (300 lb)   SpO2 96%   BMI 43.05 kg/m²    ECOG Performance Status: 1 - Symptomatic but completely ambulatory  General Appearance:  alert, cooperative, no apparent distress and appears stated age   Neurologic/Psychiatric: A&O x 3, gait steady, appropriate affect, strength 5/5 in all muscle groups   HEENT:  Normocephalic, without obvious abnormality, mucous membranes moist   Neck: Supple, symmetrical, trachea midline, no adenopathy;  No thyromegaly, masses, or tenderness   Lungs:   Clear to auscultation bilaterally; respirations regular, even, and unlabored bilaterally   Heart:  Regular rate and rhythm, no murmurs appreciated   Abdomen:   Soft, non-tender, non-distended and no organomegaly   Lymph nodes: No cervical, supraclavicular, inguinal or axillary adenopathy noted   Extremities: Normal, atraumatic; no clubbing, cyanosis, or edema    Skin: No rashes, ulcers, or suspicious lesions noted     Hospital Outpatient Visit on 04/04/2018   Component Date Value Ref Range Status   • BSA 04/04/2018 2.5  m^2 Final   •  CV ECHO DOLORES - RVDD 04/04/2018 3.3  cm Final   • IVSd 04/04/2018 1.5  cm Final   • LVIDd 04/04/2018 4.6  cm Final   • LVIDs 04/04/2018 3.0  cm Final   • LVPWd 04/04/2018 1.3  cm Final   • IVS/LVPW 04/04/2018 1.2   Final   • FS 04/04/2018 34.5  % Final   • EDV(Teich) 04/04/2018 97.2  ml Final   • ESV(Teich) 04/04/2018 35.4  ml Final   • EF(Teich) 04/04/2018 63.6  % Final   • EDV(cubed) 04/04/2018 97.2  ml Final   • ESV(cubed) 04/04/2018 27.4  ml Final   • EF(cubed)  04/04/2018 71.8  % Final   • LV mass(C)d 04/04/2018 250.8  grams Final   • LV mass(C)dI 04/04/2018 101.1  grams/m^2 Final   • SV(Teich) 04/04/2018 61.8  ml Final   • SI(Teich) 04/04/2018 24.9  ml/m^2 Final   • SV(cubed) 04/04/2018 69.8  ml Final   • SI(cubed) 04/04/2018 28.2  ml/m^2 Final   • Ao root diam 04/04/2018 2.9  cm Final   • Ao root area 04/04/2018 6.7  cm^2 Final   • LA dimension 04/04/2018 3.8  cm Final   • LA/Ao 04/04/2018 1.3   Final   • LVLd ap4 04/04/2018 7.6  cm Final   • EDV(MOD-sp4) 04/04/2018 66.0  ml Final   • LVLs ap4 04/04/2018 5.7  cm Final   • ESV(MOD-sp4) 04/04/2018 24.0  ml Final   • EF(MOD-sp4) 04/04/2018 63  % Final   • LVLd ap2 04/04/2018 7.1  cm Final   • EDV(MOD-sp2) 04/04/2018 79.0  ml Final   • LVLs ap2 04/04/2018 6.2  cm Final   • ESV(MOD-sp2) 04/04/2018 33.0  ml Final   • EF(MOD-sp2) 04/04/2018 58.2  % Final   • SV(MOD-sp4) 04/04/2018 42.0  ml Final   • SI(MOD-sp4) 04/04/2018 16.9  ml/m^2 Final   • SV(MOD-sp2) 04/04/2018 46.0  ml Final   • SI(MOD-sp2) 04/04/2018 18.5  ml/m^2 Final   • Ao root area (BSA corrected) 04/04/2018 1.2   Final   • Ao root area (BSA corrected) 04/04/2018 58.2  ml/m^2 Final   • CONTRAST EF 4CH 04/04/2018 63.6  ml/m^2 Final   • LV Diastolic corrected for BSA 04/04/2018 26.6  ml/m^2 Final   • LV Systolic corrected for BSA 04/04/2018 9.7  ml/m^2 Final   • MV E max donald 04/04/2018 91.8  cm/sec Final   • MV A max donald 04/04/2018 111.1  cm/sec Final   • MV E/A 04/04/2018 0.83   Final   • MV dec slope 04/04/2018 546.6  cm/sec^2 Final   • MV dec time 04/04/2018 0.24  sec Final   • PA acc slope 04/04/2018 616.2  cm/sec^2 Final   • PA acc time 04/04/2018 0.14  sec Final   • TR max donald 04/04/2018 304.2  cm/sec Final   • RVSP(TR) 04/04/2018 43.0  mmHg Final   • RAP systole 04/04/2018 5.0  mmHg Final   • PA pr(Accel) 04/04/2018 16.6  mmHg Final   • BH CV ECHO DOLORES - BZI_BMI 04/04/2018 43.0  kilograms/m^2 Final   • BH CV ECHO DOLORES - BSA(HAYCOCK) 04/04/2018 2.7  m^2 Final    • BH CV ECHO DOLORES - BZI_METRIC_WEIGHT 04/04/2018 136.1  kg Final   • BH CV ECHO DOLORES - BZI_METRIC_HEIGHT 04/04/2018 177.8  cm Final   •  CV VAS BP RIGHT ARM 04/04/2018 99/52  mmHg Final   • TDI S' 04/04/2018 16.00  cm/sec Final   • RV Base 04/04/2018 2.50  cm Final   • RV Length 04/04/2018 5.90  cm Final   • RV Mid 04/04/2018 2.10  cm Final   • LA Volume Index 04/04/2018 23.4  mL/m2 Final   • TAPSE (>1.6) 04/04/2018 2.20  cm2 Final   • Target HR (85%) 04/04/2018 128  bpm Final   • Max. Pred. HR (100%) 04/04/2018 150  bpm Final   • Echo EF Estimated 04/04/2018 65  % Final   Infusion on 04/03/2018   Component Date Value Ref Range Status   • Magnesium 04/03/2018 1.9  1.3 - 2.7 mg/dL Final   • Phosphorus 04/03/2018 3.2  2.4 - 5.1 mg/dL Final   • Glucose 04/03/2018 144* 70 - 100 mg/dL Final   • BUN 04/03/2018 25* 9 - 23 mg/dL Final   • Creatinine 04/03/2018 1.00  0.60 - 1.30 mg/dL Final   • Sodium 04/03/2018 138  132 - 146 mmol/L Final   • Potassium 04/03/2018 4.0  3.5 - 5.5 mmol/L Final   • Chloride 04/03/2018 105  99 - 109 mmol/L Final   • CO2 04/03/2018 22.0  20.0 - 31.0 mmol/L Final   • Calcium 04/03/2018 8.6* 8.7 - 10.4 mg/dL Final   • Total Protein 04/03/2018 5.9  5.7 - 8.2 g/dL Final   • Albumin 04/03/2018 4.10  3.20 - 4.80 g/dL Final   • ALT (SGPT) 04/03/2018 35  7 - 40 U/L Final   • AST (SGOT) 04/03/2018 33  0 - 33 U/L Final   • Alkaline Phosphatase 04/03/2018 80  25 - 100 U/L Final   • Total Bilirubin 04/03/2018 0.5  0.3 - 1.2 mg/dL Final   • eGFR Non African Amer 04/03/2018 74  >60 mL/min/1.73 Final   • Globulin 04/03/2018 1.8  gm/dL Final   • A/G Ratio 04/03/2018 2.3  1.5 - 2.5 g/dL Final   • BUN/Creatinine Ratio 04/03/2018 25.0  7.0 - 25.0 Final   • Anion Gap 04/03/2018 11.0  3.0 - 11.0 mmol/L Final   • WBC 04/03/2018 7.40  3.50 - 10.80 10*3/mm3 Final   • RBC 04/03/2018 3.88* 4.20 - 5.76 10*6/mm3 Final   • Hemoglobin 04/03/2018 11.8* 13.1 - 17.5 g/dL Final   • Hematocrit 04/03/2018 37.7* 38.9 - 50.9 %  Final   • RDW 04/03/2018 19.3* 11.3 - 14.5 % Final   • MCV 04/03/2018 97.4  80.0 - 99.0 fL Final   • MCH 04/03/2018 30.6  27.0 - 31.0 pg Final   • MCHC 04/03/2018 31.4* 32.0 - 36.0 g/dL Final   • MPV 04/03/2018 9.7  6.0 - 12.0 fL Final   • Platelets 04/03/2018 65* 150 - 450 10*3/mm3 Final   • Neutrophil % 04/03/2018 84.3* 41.0 - 71.0 % Final   • Lymphocyte % 04/03/2018 11.2* 24.0 - 44.0 % Final   • Monocyte % 04/03/2018 4.5  0.0 - 12.0 % Final   • Neutrophils, Absolute 04/03/2018 6.20  1.50 - 8.30 10*3/mm3 Final   • Lymphocytes, Absolute 04/03/2018 0.80  0.60 - 4.80 10*3/mm3 Final   • Monocytes, Absolute 04/03/2018 0.30  0.00 - 1.00 10*3/mm3 Final   • Creatinine 04/03/2018 1.00  0.60 - 1.30 mg/dL Final        No results found.    ASSESSMENT: The patient is a very pleasant 71-year-old gentleman with new  diagnosis of multiple myeloma.    PROBLEM LIST:  1. Multiple myeloma, kappa restricted, M-spike 2.4 at diagnosis, ISS score  stage 2 with normal albumin but beta-2 microglobulin level of 4.1.   2. 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   3. Completed 6 cycles of Revlimid, Velcade, and dexamethasone from 12/09/2013  until 04/07/2014.  4. Received palliative radiation to T11, 4 treatments, completed 12/11/2013.   5. Status post autologous stem cell transplant done at [facility]  with Dr. Bry Johnson 05/13/2014.  6. Relapsed disease documented with elevated kappa lambda ratio with minute  amount of monoclonal protein done on 06/09/2015.   7. Bilateral pulmonary embolism, deep venous thrombosis 07/2014 on chronic  anticoagulation with Coumadin.  8. Chronic kidney disease Stage II.   9. 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   10. Started Revlimid 25 mg p.o. daily 2 weeks on and 1 week off on 06/16/2015.  11. Switched to Ninlaro on 02/21/2017 Secondary to increase in SFL  ratio.  12. Added Decadron 40 mg weekly with Revlimid 25 mg 2 weeks on 1 week off on 4/14/2017 secondary to rising SFL ration on Ninlaro alone.   13.  Treatment was switched to Carfilzumab,Revlimid with dexamethasone , started on April 3, 2018.  14. Leukopenia and thrombocytopenia.   15. Peripheral neuropathy  16.  Drug induced hepatitis  17. Partial seizure disorder.    PLAN:  1. I would proceed with treatment as scheduled today with Decadron 20 mg weekly with Revlimid 25 mg 3 weeks on 1 week off, with Kyprolis 2 consecutive days weekly 3 weeks on and 1 week off.  2.  I will continue to monitor patient blood work including blood counts kidney function and liver enzymes..   3.  I will repeat serum free light chains in 2 weeks.  4. The patient will come back to see me in 2 weeks with blood work and treatment.  5. We will continue folic acid daily. I am suspecting his leukopenia and thrombocytopenia is secondary to Depakote and Keppra.   6. I will continue warfarin for history of DVT with monoclonal gammopathy.   7.  The patient is still on Neurontin for peripheral neuropathy. He will follow up with his neurologist regarding management.   8.  I reviewed the potential side effects of Kyprolis and Revlimid including but not limited to peripheral neuropathy, edema, rash, diarrhea, low blood counts,hepatic insufficiency, and potential death.   9.  The patient will continue to follow up with Dr. Reyes at [facility] regarding whether the patient may be a candidate for a second autologous stem cell transplant.  10.  I will continue the patient on Bactrim DS twice a day on Saturdays and Sundays for prophylaxis since patient was given high-dose steroids.  11.  I will continue Protonix 40 mg daily for GI prophylaxis.  I will refill it today.  12.  The patient will continue Zometa 4 mg IV every 6 weeks with calcium and vitamin D due to mild diffuse lytic lesions seen on skeletal survey done 2/21/2017.  His next dose  due in May 2018.  13.  I will continue patient on tramadol 50 mg daily at bedtime as needed for cancer-related pain.  14.  I'll continue patient on Acyclovir 400 mg twice a day for HSV prophylaxis.    Rosalie Segovia MD  4/10/2018  2:07 PM

## 2018-04-26 PROBLEM — G25.0 ESSENTIAL TREMOR: Status: ACTIVE | Noted: 2018-01-01

## 2018-04-26 NOTE — PROGRESS NOTES
Subjective   Johnny Ramirez is a 72 y.o. male.     Chief Complaint   Patient presents with   • Localization-related symptomatic epilepsy and epileptic synd       History of Present Illness   Pt f/b Dr Sharma for neuropathy and headaches, first seen here 2/16 for possible stroke: difficulty with speech, tongue numb. No limb sx. A few days later developed repetitive jerking in left lower face, later noted on VEEG to persist in sleep, and decreased with Ativan and LVT at ER, stopped completely with VPA. On coumadin for chronic PE, and has multiple myeloma. Saw Dr Archer who concurred with dx of simple partial szs.  Brain MRI showed area of increased T2 signal in the posterior superior right frontal, nonenhancing, which appears on diffusion images to show T2 shine through, difficult to rule out some increase signal on diffusion, but not clearly seen on ADC mapping. Had echo, on asa, CTA OK.  Was on 500 mg bid of both Depakote and Keppra.   Then had a few twitches one morning; Depakote level was 40, planned to increase to 1500 mg. Rearranged dosing time, more tolerable.  MRI 6/27 showed resolution of the right subcortical lesion.  LVT 6.6, VPA 73 (on 1000mg and 1500mg respectively).  Called subsequently due to low blood counts and cut back Depakote to 1000mg. Feeling fine with side effects now, and wbc stable.    Then tiny jump in left upper lip with stress. Quit stressful activity and it went away. Trying acupuncture for foot pain. Also takes GBP. Has been on TPM for a while for migraines, in remission. Some hand tremor with action.  Planned: taper off TPM. Can try decreasing GBP, discussed possibility of Lyrica as a substitute in future.  12/16: Gets pain on bottom of feet, that feels raw/stung, top of toes sting. Can't bear socks. Tremor occurs with more than one cup of decaffeinated coffee.     Tx with Velcade in spring of 2014, then revlimid until tx with stem cell tx. Restarted revlimid summer 2015.   Had NCS  with Dr Morgan 6/15, showed mild-mod axonal neuropathy. Repeat NCS/EMG 12/16 with Dr. 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  Over time foot sx have gradually worsened, with pain as main sx. CSF protein was 34 in March 2016. No seizures.  10/17: some changes in chemo meds: Ninlaro, Revlimid and dexamethasone.  LFTs were elevated, coming back down (reviewed). Stopped tylenol. Feet not too bad lately. GBP works as long as remembers to take it tid.  Tremor of hands is very bothersome, can't use his hands effectively. No facial twitch in recent memory. Taking 1/2 LVT 1000mg bid, and [id number] VPA ER at night.  Doesn't know family medical hx, eg re: tremor. Planned taper of VPA, starting level was 45.   Today: doing well with no seizures whatsoever on LVT 500mg bid alone -- successful slow taper off VPA. Tremor is better, can write better now, and use hands generally. Added new med for myeloma, foot pain about the same. Considering repeat BM tx, but unsure, got only a year of benefit last time and very trying. Was on warfarin (for DVT and PE) at time of seizure onset. Has questions about risk of stroke with BM tx. Has gained 50lb on steroids.    The following portions of the patient's history were reviewed and updated as appropriate: allergies, current medications, past family history, past medical history, past social history, past surgical history and problem list.    Allergies   Allergen Reactions   • Other      mold   • Trichophyton        Current Outpatient Prescriptions on File Prior to Visit   Medication Sig Dispense Refill   • acyclovir (ZOVIRAX) 400 MG tablet Take 1 tablet by mouth 2 (Two) Times a Day. 60 tablet 11   • aspirin 81 MG tablet Take 81 mg by mouth Daily.     • azelastine (ASTELIN) 0.1 % nasal spray into each nostril 2 (two) times a day.     • baclofen (LIORESAL) 10 MG tablet TK 1 - 2 TS QHS  2   • Calcium  Carb-Cholecalciferol (CALCIUM + D3) 600-200 MG-UNIT tablet Take  by mouth.     • cyclobenzaprine (FLEXERIL) 5 MG tablet Take 5 mg by mouth As Needed for Muscle Spasms.     • dexamethasone (DECADRON) 4 MG tablet TAKE 10 TABLETS BY MOUTH WITH BREAKFAST, ON DAYS 1, 8, 15 AND 22 40 tablet 5   • docusate sodium (DOCQLACE) 100 MG capsule Take  by mouth.     • folic acid (FOLVITE) 1 MG tablet TK 1 T PO QD  5   • gabapentin (NEURONTIN) 300 MG capsule TAKE 1 CAPSULE BY MOUTH THREE TIMES DAILY 90 capsule 1   • Glucosamine-Chondroitin (OSTEO BI-FLEX REGULAR STRENGTH) 250-200 MG tablet Take 1 tablet by mouth 2 (Two) Times a Day.     • levETIRAcetam (KEPPRA) 1000 MG tablet TAKE 1 1/2 TABLET BY MOUTH TWICE DAILY (Patient taking differently: takes half tablet twice daily) 90 tablet 0   • lidocaine (LMX) 4 % cream Apply  topically As Needed for Mild Pain .     • loratadine (CLARITIN) 10 MG tablet Take  by mouth.     • methylphenidate (CONCERTA) 36 MG CR tablet Take 36 mg by mouth Every Morning       • pantoprazole (PROTONIX) 40 MG EC tablet Take 1 tablet by mouth Daily. 30 tablet 5   • phosphorus (K PHOS NEUTRAL) 155-852-130 MG tablet Take 1 tablet by mouth Daily. 30 tablet 2   • Probiotic Product (PROBIOTIC PO) Take 1 tablet by mouth 2 (Two) Times a Day As Needed.     • sulfamethoxazole-trimethoprim (BACTRIM DS,SEPTRA DS) 800-160 MG per tablet TAKE 1 TABLET PO BID ON SATURDAYS AND SUNDAYS FOR PROPHYLAXIS  3   • Valerian Root 450 MG capsule Take  by mouth.     • vitamin B-12 (CYANOCOBALAMIN) 500 MCG tablet Take 500 mcg by mouth Daily.     • warfarin (COUMADIN) 5 MG tablet Take 5 mg by mouth As Needed.       Current Facility-Administered Medications on File Prior to Visit   Medication Dose Route Frequency Provider Last Rate Last Dose   • sodium chloride 0.9 % infusion 250 mL  250 mL Intravenous Once CHRISTY Lemus           Past Medical History:   Diagnosis Date   • Arthritis    • Stroke        Past Surgical History:  "  Procedure Laterality Date   • OTHER SURGICAL HISTORY      jaw surgery   • OTHER SURGICAL HISTORY      Open Treatment of Maxillary Alveolar Ridge Fracture   • OTHER SURGICAL HISTORY      tonsilectomy       Social History     Social History   • Marital status:      Spouse name: N/A   • Number of children: N/A   • Years of education: N/A     Occupational History   • Not on file.     Social History Main Topics   • Smoking status: Former Smoker   • Smokeless tobacco: Never Used      Comment: Quit in 1986   • Alcohol use No   • Drug use: No   • Sexual activity: Defer     Other Topics Concern   • Not on file     Social History Narrative   • No narrative on file       Review of Systems   Constitutional: Positive for fatigue. Negative for fever and unexpected weight change.   Respiratory: Negative for cough and shortness of breath.    Cardiovascular: Negative for chest pain.       Objective   Blood pressure 120/70, height 177.8 cm (70\"), weight (!) 137 kg (301 lb).    Physical Exam   Constitutional: He is oriented to person, place, and time. He appears well-developed and well-nourished.   HENT:   Head: Normocephalic and atraumatic.   Eyes: EOM are normal. Pupils are equal, round, and reactive to light.   Pulmonary/Chest: Effort normal.   Musculoskeletal: Normal range of motion.   Neurological: He is oriented to person, place, and time. He has a normal Finger-Nose-Finger Test and a normal Heel to Shin Test. Gait normal.   Skin: Skin is warm and dry.   Psychiatric: He has a normal mood and affect. His speech is normal and behavior is normal. Judgment and thought content normal.   Nursing note and vitals reviewed.      Neurologic Exam     Mental Status   Oriented to person, place, and time.   Speech: speech is normal   Level of consciousness: alert  Knowledge: consistent with education.   Able to name object. Normal comprehension.     Cranial Nerves     CN II   Visual fields full to confrontation.     CN III, IV, VI "   Pupils are equal, round, and reactive to light.  Extraocular motions are normal.     CN VII   Facial expression full, symmetric.     CN IX, X   CN IX normal.   CN X normal.   Palate: symmetric    CN XI   CN XI normal.     CN XII   CN XII normal.     Motor Exam   Muscle bulk: normal  Right arm pronator drift: absent  Left arm pronator drift: absent    Strength   Strength 5/5 except as noted.     Gait, Coordination, and Reflexes     Gait  Gait: normal    Coordination   Finger to nose coordination: normal  Heel to shin coordination: normal    Tremor   Resting tremor: absent  Intention tremor: present  Action tremor: left arm and right arm      Assessment/Plan     Johnny was seen today for localization-related symptomatic epilepsy and epileptic synd.    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 Discussed risk of stroke with myeloma, vs risk of procedure.  Discussed dx of essential tremor, potential tx, monitoring INR if starts primidone.  Discussed neuropathic pain, potential worsening of neuropathy with myeloma meds.  33 min face to face, 25 min in discussion as above.  Return in about 6 months (around 10/26/2018).

## 2018-05-01 NOTE — PROGRESS NOTES
DATE OF VISIT: 5/1/2018    REASON FOR VISIT: Followup for multiple myeloma, M-spike at diagnosis 2.4,  kappa restricted, with diffuse lytic lesions.      HISTORY OF PRESENT ILLNESS: The patient is a very pleasant 70-year-old  gentleman with past medical history significant for multiple myeloma diagnosed  11/26/2013. The patient presented with back pain, had a CAT scan done that  showed multiple vertebral body fractures with lytic lesions. He had a T11  biopsy done that came back with plasmocytosis compatible with multiple myeloma.  The patient was referred to me on 12/03/2013. I did a bone marrow biopsy that  came back consistent with multiple myeloma. Cytogenics at this point are still  pending. Whole body bone survey showed multiple bony lytic lesions affecting  right humerus, vertebral bodies, as well as pelvic bones. The patient had  serum protein electrophoresis done at diagnosis that showed monoclonal protein  of 2.5 grams plus 0.5 grams, kappa restricted. 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 The  patient was started on Revlimid 1.2 mg/sq m subcu once weekly 2 weeks on and 1  week off, Velcade 25 mg p.o. daily 2 weeks on and 1 week off, and dexamethasone  40 mg p.o. weekly on 12/09/2013. The patient completed cycle #6 on 04/07/2014.  The patient received autologous stem cell transplant with Dr. Bry Johnson at  [facility] on 05/13/2014. His day 100 was 08/26/2014. He had serum  protein electrophoresis that failed to show any evidence of monoclonal protein,  serum free light chain ratio was slightly elevated. The patient was started on  Revlimid 15 mg p.o. daily 2 weeks on and 1 week off on 06/16/2015. The patient's therapy was changed to Ninlaro 4 mg weekly 3 weeks on and 1 week off due to rising kappa lambda ratio. His kappa lambda ration continued rise of Ninlaro and he was started on Decadron 40 mg weekly with Revlimid 25 mg 2  weeks on and 1 week off on 4/14/2017.  His serum free light chain started to increase and treatment was switched to Carfilzumab,Revlimid with dexamethasone , started on April 3, 2018.  The patient is here today for a scheduled followup visit.    SUBJECTIVE: Mr. Ramirez is here today by himself.  He continued to complain of fatigue.  Numbness is stable.  He continued to have stable joint pain.  He is complaining of weight gain.  He had mild shortness of breath after the infusion, last for few days.  Denied any swelling in his legs.    PAST MEDICAL HISTORY/SOCIAL HISTORY/FAMILY HISTORY: Unchanged from my prior documentation done on 12/03/2013.    Review of Systems   Constitutional: Positive for fatigue. Negative for activity change, appetite change, chills, fever and unexpected weight change.   HENT: Negative for hearing loss, mouth sores, nosebleeds, sore throat and trouble swallowing.    Eyes: Negative for visual disturbance.   Respiratory: Negative for cough, chest tightness, shortness of breath and wheezing.    Cardiovascular: Negative for chest pain, palpitations and leg swelling.   Gastrointestinal: Negative for abdominal distention, abdominal pain, blood in stool, constipation, diarrhea, nausea, rectal pain and vomiting.   Endocrine: Negative for cold intolerance and heat intolerance.   Genitourinary: Negative for difficulty urinating, dysuria, frequency and urgency.   Musculoskeletal: Positive for arthralgias. Negative for back pain, gait problem, joint swelling and myalgias.   Skin: Negative for rash.   Neurological: Positive for numbness. Negative for dizziness, tremors, syncope, weakness, light-headedness and headaches.   Hematological: Negative for adenopathy. Does not bruise/bleed easily.   Psychiatric/Behavioral: Negative for confusion, sleep disturbance and suicidal ideas. The patient is not nervous/anxious.          Current Outpatient Prescriptions:   •  acyclovir (ZOVIRAX) 400 MG tablet, Take 1 tablet  by mouth 2 (Two) Times a Day., Disp: 60 tablet, Rfl: 11  •  aspirin 81 MG tablet, Take 81 mg by mouth Daily., Disp: , Rfl:   •  azelastine (ASTELIN) 0.1 % nasal spray, into each nostril 2 (two) times a day., Disp: , Rfl:   •  baclofen (LIORESAL) 10 MG tablet, TK 1 - 2 TS QHS, Disp: , Rfl: 2  •  Calcium Carb-Cholecalciferol (CALCIUM + D3) 600-200 MG-UNIT tablet, Take  by mouth., Disp: , Rfl:   •  carfilzomib (KYPROLIS) 30 MG chemo syringe, , Disp: , Rfl:   •  cyclobenzaprine (FLEXERIL) 5 MG tablet, Take 5 mg by mouth As Needed for Muscle Spasms., Disp: , Rfl:   •  dexamethasone (DECADRON) 4 MG tablet, TAKE 10 TABLETS BY MOUTH WITH BREAKFAST, ON DAYS 1, 8, 15 AND 22, Disp: 40 tablet, Rfl: 5  •  docusate sodium (DOCQLACE) 100 MG capsule, Take  by mouth., Disp: , Rfl:   •  folic acid (FOLVITE) 1 MG tablet, TK 1 T PO QD, Disp: , Rfl: 5  •  gabapentin (NEURONTIN) 300 MG capsule, TAKE 1 CAPSULE BY MOUTH THREE TIMES DAILY, Disp: 90 capsule, Rfl: 1  •  Glucosamine-Chondroitin (OSTEO BI-FLEX REGULAR STRENGTH) 250-200 MG tablet, Take 1 tablet by mouth 2 (Two) Times a Day., Disp: , Rfl:   •  levETIRAcetam (KEPPRA) 1000 MG tablet, TAKE 1 1/2 TABLET BY MOUTH TWICE DAILY (Patient taking differently: takes half tablet twice daily), Disp: 90 tablet, Rfl: 0  •  lidocaine (LMX) 4 % cream, Apply  topically As Needed for Mild Pain ., Disp: , Rfl:   •  loratadine (CLARITIN) 10 MG tablet, Take  by mouth., Disp: , Rfl:   •  methylphenidate (CONCERTA) 36 MG CR tablet, Take 36 mg by mouth Every Morning  , Disp: , Rfl:   •  ondansetron (ZOFRAN) 8 MG tablet, TAKE 1 TABLET BY MOUTH THREE TIMES DAILY FOR NAUSEA AND VOMITING, Disp: 30 tablet, Rfl: 5  •  pantoprazole (PROTONIX) 40 MG EC tablet, Take 1 tablet by mouth Daily., Disp: 30 tablet, Rfl: 5  •  phosphorus (K PHOS NEUTRAL) 155-852-130 MG tablet, Take 1 tablet by mouth Daily., Disp: 30 tablet, Rfl: 2  •  Probiotic Product (PROBIOTIC PO), Take 1 tablet by mouth 2 (Two) Times a Day As Needed.,  "Disp: , Rfl:   •  sulfamethoxazole-trimethoprim (BACTRIM DS,SEPTRA DS) 800-160 MG per tablet, TAKE 1 TABLET PO BID ON SATURDAYS AND SUNDAYS FOR PROPHYLAXIS, Disp: , Rfl: 3  •  Valerian Root 450 MG capsule, Take  by mouth., Disp: , Rfl:   •  vitamin B-12 (CYANOCOBALAMIN) 500 MCG tablet, Take 500 mcg by mouth Daily., Disp: , Rfl:   •  warfarin (COUMADIN) 5 MG tablet, Take 5 mg by mouth As Needed., Disp: , Rfl:   No current facility-administered medications for this visit.     Facility-Administered Medications Ordered in Other Visits:   •  sodium chloride 0.9 % infusion 250 mL, 250 mL, Intravenous, Once, Susan Coronado, APRN    PHYSICAL EXAMINATION:   Ht 177.8 cm (70\")    ECOG Performance Status: 1 - Symptomatic but completely ambulatory  General Appearance:  alert, cooperative, no apparent distress and appears stated age   Neurologic/Psychiatric: A&O x 3, gait steady, appropriate affect, strength 5/5 in all muscle groups   HEENT:  Normocephalic, without obvious abnormality, mucous membranes moist   Neck: Supple, symmetrical, trachea midline, no adenopathy;  No thyromegaly, masses, or tenderness   Lungs:   Clear to auscultation bilaterally; respirations regular, even, and unlabored bilaterally   Heart:  Regular rate and rhythm, no murmurs appreciated   Abdomen:   Soft, non-tender, non-distended and no organomegaly   Lymph nodes: No cervical, supraclavicular, inguinal or axillary adenopathy noted   Extremities: Normal, atraumatic; no clubbing, cyanosis, or edema    Skin: No rashes, ulcers, or suspicious lesions noted     Lab on 04/26/2018   Component Date Value Ref Range Status   • Glucose 04/26/2018 118* 70 - 100 mg/dL Final   • BUN 04/26/2018 20  9 - 23 mg/dL Final   • Creatinine 04/26/2018 0.90  0.60 - 1.30 mg/dL Final   • Sodium 04/26/2018 138  132 - 146 mmol/L Final   • Potassium 04/26/2018 4.0  3.5 - 5.5 mmol/L Final   • Chloride 04/26/2018 104  99 - 109 mmol/L Final   • CO2 04/26/2018 25.0  20.0 - 31.0 mmol/L " Final   • Calcium 04/26/2018 8.2* 8.7 - 10.4 mg/dL Final   • Total Protein 04/26/2018 5.5* 5.7 - 8.2 g/dL Final   • Albumin 04/26/2018 3.90  3.20 - 4.80 g/dL Final   • ALT (SGPT) 04/26/2018 30  7 - 40 U/L Final   • AST (SGOT) 04/26/2018 20  0 - 33 U/L Final   • Alkaline Phosphatase 04/26/2018 74  25 - 100 U/L Final   • Total Bilirubin 04/26/2018 0.9  0.3 - 1.2 mg/dL Final   • eGFR Non  Amer 04/26/2018 83  >60 mL/min/1.73 Final   • Globulin 04/26/2018 1.6  gm/dL Final   • A/G Ratio 04/26/2018 2.4  1.5 - 2.5 g/dL Final   • BUN/Creatinine Ratio 04/26/2018 22.2  7.0 - 25.0 Final   • Anion Gap 04/26/2018 9.0  3.0 - 11.0 mmol/L Final   • WBC 04/26/2018 4.50  3.50 - 10.80 10*3/mm3 Final   • RBC 04/26/2018 4.08* 4.20 - 5.76 10*6/mm3 Final   • Hemoglobin 04/26/2018 12.4* 13.1 - 17.5 g/dL Final   • Hematocrit 04/26/2018 39.6  38.9 - 50.9 % Final   • RDW 04/26/2018 18.2* 11.3 - 14.5 % Final   • MCV 04/26/2018 97.1  80.0 - 99.0 fL Final   • MCH 04/26/2018 30.5  27.0 - 31.0 pg Final   • MCHC 04/26/2018 31.4* 32.0 - 36.0 g/dL Final   • MPV 04/26/2018 9.7  6.0 - 12.0 fL Final   • Platelets 04/26/2018 100* 150 - 450 10*3/mm3 Final   • Neutrophil % 04/26/2018 76.3* 41.0 - 71.0 % Final   • Lymphocyte % 04/26/2018 17.4* 24.0 - 44.0 % Final   • Monocyte % 04/26/2018 6.3  0.0 - 12.0 % Final   • Neutrophils, Absolute 04/26/2018 3.40  1.50 - 8.30 10*3/mm3 Final   • Lymphocytes, Absolute 04/26/2018 0.80  0.60 - 4.80 10*3/mm3 Final   • Monocytes, Absolute 04/26/2018 0.30  0.00 - 1.00 10*3/mm3 Final        No results found.    ASSESSMENT: The patient is a very pleasant 71-year-old gentleman with new  diagnosis of multiple myeloma.    PROBLEM LIST:  1. Multiple myeloma, kappa restricted, M-spike 2.4 at diagnosis, ISS score  stage 2 with normal albumin but beta-2 microglobulin level of 4.1.   2. 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   3. Completed 6 cycles of Revlimid,  Velcade, and dexamethasone from 12/09/2013  until 04/07/2014.  4. Received palliative radiation to T11, 4 treatments, completed 12/11/2013.   5. Status post autologous stem cell transplant done at [facility]  with Dr. Bry Johnson 05/13/2014.  6. Relapsed disease documented with elevated kappa lambda ratio with minute  amount of monoclonal protein done on 06/09/2015.   7. Bilateral pulmonary embolism, deep venous thrombosis 07/2014 on chronic  anticoagulation with Coumadin.  8. Chronic kidney disease Stage II.   9. 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   10. Started Revlimid 25 mg p.o. daily 2 weeks on and 1 week off on 06/16/2015.  11. Switched to Ninlaro on 02/21/2017 Secondary to increase in SFL ratio.  12. Added Decadron 40 mg weekly with Revlimid 25 mg 2 weeks on 1 week off on 4/14/2017 secondary to rising SFL ration on Ninlaro alone.   13.  Treatment switched to Carfilzumab,Revlimid with dexamethasone , started on April 3, 2018, status post 1 cycle  14. Leukopenia and thrombocytopenia.   15. Peripheral neuropathy  16.  Drug induced hepatitis  17. Partial seizure disorder.    PLAN:  1. I will proceed with treatment as scheduled today with Decadron 20 mg weekly with Revlimid 25 mg 3 weeks on 1 week off, with Kyprolis 2 consecutive days weekly 3 weeks on and 1 week off.  Cycle #2.  2.  I will continue to monitor patient blood work including blood counts kidney function and liver enzymes..   3.  I will repeat serum free light chains on day 1 of each cycle.  This would be drawn today.  4. The patient will come back to see me in 4 weeks with blood work and treatment.  5. We will continue folic acid daily. I am suspecting his leukopenia and thrombocytopenia is secondary to Depakote and Keppra.   6. I will continue warfarin for history of DVT with monoclonal gammopathy.   7.  The patient is still on Neurontin for peripheral  neuropathy. He will follow up with his neurologist regarding management.   8.  I reviewed the potential side effects of Kyprolis and Revlimid including but not limited to peripheral neuropathy, edema, rash, diarrhea, low blood counts,hepatic insufficiency, and potential death.   9.  The patient will continue to follow up with Dr. Reyes at [facility] regarding whether the patient may be a candidate for a second autologous stem cell transplant.  10.  I will continue the patient on Bactrim DS twice a day on Saturdays and Sundays for prophylaxis since patient was given high-dose steroids.  11.  I will continue Protonix 40 mg daily for GI prophylaxis.  12.  The patient will continue Zometa 4 mg IV every 6 weeks with calcium and vitamin D due to mild diffuse lytic lesions seen on skeletal survey done 2/21/2017.  His next dose due in May 2018.  13.  I will continue patient on tramadol 50 mg daily at bedtime as needed for cancer-related pain.  14.  I'll continue patient on Acyclovir 400 mg twice a day for HSV prophylaxis.    Rosalie Segovia MD  5/1/2018  10:08 AM

## 2018-05-29 NOTE — PROGRESS NOTES
Oral Chemotherapy Teaching      Patient Name/:  Johnny Ramirez   1945  Oral Chemotherapy Regimen:  Carflizomib IV on Day 1,2,8,9,15,16 of a 28 Day Cycle + Dexamethasone 20mg PO weekly + Revlimid 25mg PO x 21 days, followed by 7 days off.     Date Started Medication:   Pt reports continuation of weekly Dex 20mg - Takes on ;   Reports Next Cycle of Revlimid due: 18    Carfilzomib  Cycle 1: 4/3/18  Cycle 2: 18  Cycle 3: 18    Initial Teaching Follow Up Comments     Safety     Storage instructions (away from children; away from heat/cold, sunlight, or moisture), handling - use of gloves (caregivers), washing hands after touching pills, managing waste     “How are you storing your medications?”, reminders on storage, proper handling (caregivers using gloves, washing hands, away from children, managing waste, etc.), disposal of medication with D/C or dosage change    Pt with prior experience with revlimid. Discussed continue storage and handling precautions. Do not donate blood. Use two forms of birth control. Pt aware and reports appropriate handling precautions.      Adherence      patient and/or caregiver on how to take medication, take with/without food, assess their adherence potential, stress importance of adherence, ways to manage adherence (pill boxes, phone reminders, calendars), what to do if miss a dose   “How are you taking your medication?” “How are you remembering to take your medication?”, “How many doses have you missed?”, determine reasons for non-adherence (not remembering, side effects, etc), ways to improve, overadherence? Remind patient of ways to improve/maintain adherence   Patient seen in infusion, provided written calendar, due for Cycle 3 Day 1 of Carfilzomib Pt reports he is currently on Revlimid with next 7 day off scheduled to start on 18. Confirmed plan for revlimid 21 days, followed by 7 days off. Reports taking dexamethasone on , updated  calendar. Reviewed the use of Acyclovir for HSV ppx.  Provided with calendar in infusion.      Side Effects/Adverse Reactions      patient on potential side effects, s/s, ways to manage, when to call MD/seek help     Determine if patient experiencing side effects, ways to manage  Pt reports tolerating therapy and no complaints or concerned voiced today.      Miscellaneous     Food interactions, DDIs, financial issues Determine if patient started any new medications since being placed on oral chemo (analyze for DDI) Script for next cycle submitted to [facility] on 5/29/18     Additional Notes:  Discussed aforementioned material with patient in infusion, aware to call with additional questions or concerns.

## 2018-06-05 NOTE — TELEPHONE ENCOUNTER
Patient advised that we will continue current treatment as planned for now, and will recheck prior to next appt.  Also advised that he can stay on the acyclovir for prophylaxis for myeloma treatment even though he will receive the new shingles vaccine as recommended by Dr Johnson

## 2018-06-05 NOTE — TELEPHONE ENCOUNTER
----- Message from Qing Dewey sent at 6/5/2018  9:27 AM EDT -----  Regarding: HIRAM-LIGHT CHAIN  Contact: [phone]  Patient called the light chain has gone back up he had an infusion today at 1:00 does anything need to be added or just wait an see? Please call.

## 2018-06-05 NOTE — TELEPHONE ENCOUNTER
----- Message from Qing Dewey sent at 6/5/2018  9:27 AM EDT -----  Regarding: HIRAM-LIGHT CHAIN  Contact: [phone]  Patient called the light chain has gone back up he had an infusion today at 1:00 does anything need to be added or just wait an see? Please call.

## 2018-06-05 NOTE — TELEPHONE ENCOUNTER
----- Message from Qing Dewey sent at 6/5/2018  9:27 AM EDT -----  Regarding: HIRAM-LIGHT CHAIN  Contact: [phone]  Patient called the light chain has gone back up he had an infusion today at 1:00 does anything need to be added or just wait an see? Please call.

## 2018-06-26 NOTE — PROGRESS NOTES
Oral Chemotherapy Teaching      Patient Name/:  Johnny Ramirez   1945  Oral Chemotherapy Regimen:  Carflizomib IV on Day 1,2,8,9,15,16 of a 28 Day Cycle + Dexamethasone 20mg PO weekly + Revlimid 25mg PO x 21 days, followed by 7 days off.     Date Started Medication:   Pt reports continuation of weekly Dex 20mg - Takes on ;   Reports Next Cycle of Revlimid due: 18    Carfilzomib  Cycle 1: 4/3/18  Cycle 2: 18  Cycle 3: 18  Cycle 4: 18    Initial Teaching Follow Up Comments     Safety     Storage instructions (away from children; away from heat/cold, sunlight, or moisture), handling - use of gloves (caregivers), washing hands after touching pills, managing waste     “How are you storing your medications?”, reminders on storage, proper handling (caregivers using gloves, washing hands, away from children, managing waste, etc.), disposal of medication with D/C or dosage change    Pt with prior experience with revlimid. Discussed continue storage and handling precautions. Do not donate blood. Use two forms of birth control. Pt aware and reports appropriate handling precautions.      Adherence      patient and/or caregiver on how to take medication, take with/without food, assess their adherence potential, stress importance of adherence, ways to manage adherence (pill boxes, phone reminders, calendars), what to do if miss a dose   “How are you taking your medication?” “How are you remembering to take your medication?”, “How many doses have you missed?”, determine reasons for non-adherence (not remembering, side effects, etc), ways to improve, overadherence? Remind patient of ways to improve/maintain adherence   Patient seen in infusion, provided written calendar, due for Cycle 4 Day 1 of Carfilzomib Pt reports he is currently on Revlimid with next 7 day off scheduled to start on 18.  Confirmed plan for revlimid 21 days, followed by 7 days off. Reports taking dexamethasone  on Mondays, updated calendar. Reviewed the use of Acyclovir for HSV ppx.  Provided with calendar in infusion.      Side Effects/Adverse Reactions      patient on potential side effects, s/s, ways to manage, when to call MD/seek help     Determine if patient experiencing side effects, ways to manage  Pt reports tolerating therapy and no complaints or concerned voiced today.      Miscellaneous     Food interactions, DDIs, financial issues Determine if patient started any new medications since being placed on oral chemo (analyze for DDI) Script for next cycle submitted to [facility] on 6/18/18     Additional Notes:  Discussed aforementioned material with patient in infusion, aware to call with additional questions or concerns.

## 2018-06-26 NOTE — PROGRESS NOTES
DATE OF VISIT: 6/26/2018    REASON FOR VISIT: Followup for multiple myeloma, M-spike at diagnosis 2.4,  kappa restricted, with diffuse lytic lesions.      HISTORY OF PRESENT ILLNESS: The patient is a very pleasant 70-year-old  gentleman with past medical history significant for multiple myeloma diagnosed  11/26/2013. The patient presented with back pain, had a CAT scan done that  showed multiple vertebral body fractures with lytic lesions. He had a T11  biopsy done that came back with plasmocytosis compatible with multiple myeloma.  The patient was referred to me on 12/03/2013. I did a bone marrow biopsy that  came back consistent with multiple myeloma. Cytogenics at this point are still  pending. Whole body bone survey showed multiple bony lytic lesions affecting  right humerus, vertebral bodies, as well as pelvic bones. The patient had  serum protein electrophoresis done at diagnosis that showed monoclonal protein  of 2.5 grams plus 0.5 grams, kappa restricted. 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 The  patient was started on Revlimid 1.2 mg/sq m subcu once weekly 2 weeks on and 1  week off, Velcade 25 mg p.o. daily 2 weeks on and 1 week off, and dexamethasone  40 mg p.o. weekly on 12/09/2013. The patient completed cycle #6 on 04/07/2014.  The patient received autologous stem cell transplant with Dr. Bry Johnson at  [facility] on 05/13/2014. His day 100 was 08/26/2014. He had serum  protein electrophoresis that failed to show any evidence of monoclonal protein,  serum free light chain ratio was slightly elevated. The patient was started on  Revlimid 15 mg p.o. daily 2 weeks on and 1 week off on 06/16/2015. The patient's therapy was changed to Ninlaro 4 mg weekly 3 weeks on and 1 week off due to rising kappa lambda ratio. His kappa lambda ration continued rise of Ninlaro and he was started on Decadron 40 mg weekly with Revlimid 25 mg 2  weeks on and 1 week off on 4/14/2017.  His serum free light chain started to increase and treatment was switched to Carfilzumab,Revlimid with dexamethasone , started on April 3, 2018.  The patient is here today for a scheduled followup visit.    SUBJECTIVE: Mr. Ramirez is here today with his wife.  Since last visit he has been to [place] emergency room with a complaint of shortness of breath, CT PE protocol was negative.  He continued to complain of fatigue.  Numbness is stable.  He continued to have stable joint pain.  Denied any swelling in his legs.    PAST MEDICAL HISTORY/SOCIAL HISTORY/FAMILY HISTORY: Unchanged from my prior documentation done on 12/03/2013.    Review of Systems   Constitutional: Positive for fatigue. Negative for activity change, appetite change, chills, fever and unexpected weight change.   HENT: Negative for hearing loss, mouth sores, nosebleeds, sore throat and trouble swallowing.    Eyes: Negative for visual disturbance.   Respiratory: Negative for cough, chest tightness, shortness of breath and wheezing.    Cardiovascular: Negative for chest pain, palpitations and leg swelling.   Gastrointestinal: Negative for abdominal distention, abdominal pain, blood in stool, constipation, diarrhea, nausea, rectal pain and vomiting.   Endocrine: Negative for cold intolerance and heat intolerance.   Genitourinary: Negative for difficulty urinating, dysuria, frequency and urgency.   Musculoskeletal: Positive for arthralgias. Negative for back pain, gait problem, joint swelling and myalgias.   Skin: Negative for rash.   Neurological: Positive for numbness. Negative for dizziness, tremors, syncope, weakness, light-headedness and headaches.   Hematological: Negative for adenopathy. Does not bruise/bleed easily.   Psychiatric/Behavioral: Negative for confusion, sleep disturbance and suicidal ideas. The patient is not nervous/anxious.          Current Outpatient Prescriptions:   •  levETIRAcetam (KEPPRA)  1000 MG tablet, TAKE 1 1/2 TABLET BY MOUTH TWICE DAILY (Patient taking differently: takes half tablet twice daily), Disp: 90 tablet, Rfl: 0  •  acyclovir (ZOVIRAX) 400 MG tablet, Take 1 tablet by mouth 2 (Two) Times a Day., Disp: 60 tablet, Rfl: 11  •  aspirin 81 MG tablet, Take 81 mg by mouth Daily., Disp: , Rfl:   •  azelastine (ASTELIN) 0.1 % nasal spray, into each nostril 2 (two) times a day., Disp: , Rfl:   •  baclofen (LIORESAL) 10 MG tablet, TK 1 - 2 TS QHS, Disp: , Rfl: 2  •  Calcium Carb-Cholecalciferol (CALCIUM + D3) 600-200 MG-UNIT tablet, Take  by mouth., Disp: , Rfl:   •  calcium carbonate (TUMS) 500 MG chewable tablet, Chew 1 tablet Daily., Disp: , Rfl:   •  carfilzomib (KYPROLIS) 30 MG chemo syringe, 60 mg., Disp: , Rfl:   •  cyclobenzaprine (FLEXERIL) 5 MG tablet, Take 5 mg by mouth As Needed for Muscle Spasms., Disp: , Rfl:   •  dexamethasone (DECADRON) 4 MG tablet, TAKE 10 TABLETS BY MOUTH WITH BREAKFAST, ON DAYS 1, 8, 15 AND 22, Disp: 40 tablet, Rfl: 5  •  docusate sodium (DOCQLACE) 100 MG capsule, Take  by mouth., Disp: , Rfl:   •  folic acid (FOLVITE) 1 MG tablet, TK 1 T PO QD, Disp: , Rfl: 5  •  gabapentin (NEURONTIN) 300 MG capsule, TAKE 1 CAPSULE BY MOUTH THREE TIMES DAILY, Disp: 90 capsule, Rfl: 0  •  Glucosamine-Chondroitin (OSTEO BI-FLEX REGULAR STRENGTH) 250-200 MG tablet, Take 1 tablet by mouth 2 (Two) Times a Day., Disp: , Rfl:   •  K-PHOS-NEUTRAL 155-852-130 MG tablet, TAKE 1 TABLET BY MOUTH DAILY, Disp: 30 tablet, Rfl: 5  •  lenalidomide (REVLIMID) 25 MG capsule, Take 1 capsule by mouth Daily for 21 days. then off 7 days. Adult Male [id number], Disp: 21 capsule, Rfl: 0  •  lidocaine (LMX) 4 % cream, Apply  topically As Needed for Mild Pain ., Disp: , Rfl:   •  loratadine (CLARITIN) 10 MG tablet, Take  by mouth., Disp: , Rfl:   •  methylphenidate (CONCERTA) 36 MG CR tablet, Take 36 mg by mouth Every Morning  , Disp: , Rfl:   •  ondansetron (ZOFRAN) 8 MG tablet, TAKE 1 TABLET BY MOUTH  "THREE TIMES DAILY FOR NAUSEA AND VOMITING, Disp: 30 tablet, Rfl: 5  •  pantoprazole (PROTONIX) 40 MG EC tablet, Take 1 tablet by mouth Daily., Disp: 30 tablet, Rfl: 5  •  Probiotic Product (PROBIOTIC PO), Take 1 tablet by mouth 2 (Two) Times a Day As Needed., Disp: , Rfl:   •  Simethicone (GAS-X PO), Take  by mouth., Disp: , Rfl:   •  sulfamethoxazole-trimethoprim (BACTRIM DS,SEPTRA DS) 800-160 MG per tablet, TAKE 1 TABLET PO BID ON SATURDAYS AND SUNDAYS FOR PROPHYLAXIS, Disp: , Rfl: 3  •  Valerian Root 450 MG capsule, Take  by mouth., Disp: , Rfl:   •  vitamin B-12 (CYANOCOBALAMIN) 500 MCG tablet, Take 500 mcg by mouth Daily., Disp: , Rfl:   •  warfarin (COUMADIN) 5 MG tablet, Take 5 mg by mouth As Needed., Disp: , Rfl:   No current facility-administered medications for this visit.     Facility-Administered Medications Ordered in Other Visits:   •  sodium chloride 0.9 % infusion 250 mL, 250 mL, Intravenous, Once, CHRISTY Lemus  •  sodium chloride 0.9 % infusion 250 mL, 250 mL, Intravenous, Once, Rosalie Segovia MD    PHYSICAL EXAMINATION:   /69 Comment: LUE  Pulse 88   Temp 97.9 °F (36.6 °C) (Temporal Artery )   Resp 20   Ht 177.8 cm (70\")   Wt 135 kg (298 lb)   BMI 42.76 kg/m²    ECOG Performance Status: 1 - Symptomatic but completely ambulatory  General Appearance:  alert, cooperative, no apparent distress and appears stated age   Neurologic/Psychiatric: A&O x 3, gait steady, appropriate affect, strength 5/5 in all muscle groups   HEENT:  Normocephalic, without obvious abnormality, mucous membranes moist   Neck: Supple, symmetrical, trachea midline, no adenopathy;  No thyromegaly, masses, or tenderness   Lungs:   Clear to auscultation bilaterally; respirations regular, even, and unlabored bilaterally   Heart:  Regular rate and rhythm, no murmurs appreciated   Abdomen:   Soft, non-tender, non-distended and no organomegaly   Lymph nodes: No cervical, supraclavicular, inguinal or axillary " adenopathy noted   Extremities: Normal, atraumatic; no clubbing, cyanosis, or edema    Skin: No rashes, ulcers, or suspicious lesions noted     Lab on 06/20/2018   Component Date Value Ref Range Status   • Free Light Chain, Kappa 06/20/2018 1067.7* 3.3 - 19.4 mg/L Final   • Free Lambda Light Chains 06/20/2018 3.1* 5.7 - 26.3 mg/L Final   • Kappa/Lambda Ratio 06/20/2018 344.42* 0.26 - 1.65 Final   • Glucose 06/20/2018 162* 70 - 100 mg/dL Final   • BUN 06/20/2018 22  9 - 23 mg/dL Final   • Creatinine 06/20/2018 0.98  0.60 - 1.30 mg/dL Final   • Sodium 06/20/2018 143  132 - 146 mmol/L Final   • Potassium 06/20/2018 3.5  3.5 - 5.5 mmol/L Final   • Chloride 06/20/2018 111* 99 - 109 mmol/L Final   • CO2 06/20/2018 23.0  20.0 - 31.0 mmol/L Final   • Calcium 06/20/2018 8.3* 8.7 - 10.4 mg/dL Final   • Total Protein 06/20/2018 5.4* 5.7 - 8.2 g/dL Final   • Albumin 06/20/2018 3.72  3.20 - 4.80 g/dL Final   • ALT (SGPT) 06/20/2018 28  7 - 40 U/L Final   • AST (SGOT) 06/20/2018 24  0 - 33 U/L Final   • Alkaline Phosphatase 06/20/2018 61  25 - 100 U/L Final   • Total Bilirubin 06/20/2018 0.8  0.3 - 1.2 mg/dL Final   • eGFR Non  Amer 06/20/2018 76  >60 mL/min/1.73 Final   • Globulin 06/20/2018 1.7  gm/dL Final   • A/G Ratio 06/20/2018 2.2  1.5 - 2.5 g/dL Final   • BUN/Creatinine Ratio 06/20/2018 22.4  7.0 - 25.0 Final   • Anion Gap 06/20/2018 9.0  3.0 - 11.0 mmol/L Final   • WBC 06/20/2018 5.40  3.50 - 10.80 10*3/mm3 Final   • RBC 06/20/2018 3.80* 4.20 - 5.76 10*6/mm3 Final   • Hemoglobin 06/20/2018 11.3* 13.1 - 17.5 g/dL Final   • Hematocrit 06/20/2018 36.8* 38.9 - 50.9 % Final   • RDW 06/20/2018 19.1* 11.3 - 14.5 % Final   • MCV 06/20/2018 96.9  80.0 - 99.0 fL Final   • MCH 06/20/2018 29.7  27.0 - 31.0 pg Final   • MCHC 06/20/2018 30.7* 32.0 - 36.0 g/dL Final   • MPV 06/20/2018 9.0  6.0 - 12.0 fL Final   • Platelets 06/20/2018 106* 150 - 450 10*3/mm3 Final    Verified by repeat analysis.    • Neutrophil % 06/20/2018 69.6   41.0 - 71.0 % Final   • Lymphocyte % 06/20/2018 24.3  24.0 - 44.0 % Final   • Monocyte % 06/20/2018 6.1  0.0 - 12.0 % Final   • Neutrophils, Absolute 06/20/2018 3.80  1.50 - 8.30 10*3/mm3 Final   • Lymphocytes, Absolute 06/20/2018 1.30  0.60 - 4.80 10*3/mm3 Final   • Monocytes, Absolute 06/20/2018 0.30  0.00 - 1.00 10*3/mm3 Final        No results found.    ASSESSMENT: The patient is a very pleasant 71-year-old gentleman with new  diagnosis of multiple myeloma.    PROBLEM LIST:  1. Multiple myeloma, kappa restricted, M-spike 2.4 at diagnosis, ISS score  stage 2 with normal albumin but beta-2 microglobulin level of 4.1.   2. 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   3. Completed 6 cycles of Revlimid, Velcade, and dexamethasone from 12/09/2013  until 04/07/2014.  4. Received palliative radiation to T11, 4 treatments, completed 12/11/2013.   5. Status post autologous stem cell transplant done at [facility]  with Dr. Bry Johnson 05/13/2014.  6. Relapsed disease documented with elevated kappa lambda ratio with minute  amount of monoclonal protein done on 06/09/2015.   7. Bilateral pulmonary embolism, deep venous thrombosis 07/2014 on chronic  anticoagulation with Coumadin.  8. Chronic kidney disease Stage II.   9. 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   10. Started Revlimid 25 mg p.o. daily 2 weeks on and 1 week off on 06/16/2015.  11. Switched to Ninlaro on 02/21/2017 Secondary to increase in SFL ratio.  12. Added Decadron 40 mg weekly with Revlimid 25 mg 2 weeks on 1 week off on 4/14/2017 secondary to rising SFL ration on Ninlaro alone.   13.  Treatment switched to Carfilzumab,Revlimid with dexamethasone , started on April 3, 2018, status post 1 cycle  14. Leukopenia and thrombocytopenia.   15. Peripheral neuropathy  16.  Drug induced hepatitis  17. Partial seizure  disorder.    PLAN:  1. I will proceed with treatment as scheduled today with Decadron 20 mg weekly with Revlimid 25 mg 3 weeks on 1 week off, with Kyprolis 2 consecutive days weekly 3 weeks on and 1 week off.  Cycle #3.  2.  I will continue to monitor patient blood work including blood counts kidney function and liver enzymes..   3.  I will repeat serum free light chains on day 1 of each cycle.  This would be drawn today.  4. The patient will come back to see me in 4 weeks with blood work and treatment.  5. We will continue folic acid daily. I am suspecting his leukopenia and thrombocytopenia is secondary to Depakote Revlimid and Keppra.   6. I will continue warfarin for history of DVT with monoclonal gammopathy.   7.  The patient is still on Neurontin for peripheral neuropathy. He will follow up with his neurologist regarding management.   8.  I reviewed the potential side effects of Kyprolis and Revlimid including but not limited to peripheral neuropathy, edema, rash, diarrhea, low blood counts,hepatic insufficiency, and potential death.   9.  The patient will continue to follow up with Dr. Reyes at [facility] regarding whether the patient may be a candidate for a second autologous stem cell transplant.  10.  I will continue the patient on Bactrim DS twice a day on Saturdays and Sundays for prophylaxis since patient was given high-dose steroids.  11.  I will continue Protonix 40 mg daily for GI prophylaxis.  12.  The patient will continue Zometa 4 mg IV every 6 weeks with calcium and vitamin D due to mild diffuse lytic lesions seen on skeletal survey done 2/21/2017.    13.  I will continue patient on tramadol 50 mg daily at bedtime as needed for cancer-related pain.  14.  I'll continue patient on Acyclovir 400 mg twice a day for HSV prophylaxis.    Rosalie Segovia MD  6/26/2018  9:43 AM

## 2018-07-24 NOTE — TELEPHONE ENCOUNTER
----- Message from Leslie Carrasquillo RN sent at 7/24/2018 11:48 AM EDT -----  Regarding: FW: critical lab      ----- Message -----  From: Nancy Mendoza RN  Sent: 7/24/2018  11:41 AM  To: CHRISTY Lafleur, Leslie Carrasquillo RN  Subject: critical lab                                         Critical Test Results      MD: Jammie Marte/Dr Vázquez    Date: 7/24/18   Critical test result: PT-51.6      INR-4.91    Time results received: 1140 am

## 2018-07-24 NOTE — TELEPHONE ENCOUNTER
VM left for pt to return call to advise who manages his warfarin. Will contact their office to let them know results for management.  Patient advised he may leave info with phone room

## 2018-07-24 NOTE — PROGRESS NOTES
DATE OF VISIT: 7/24/2018    REASON FOR VISIT: Followup for multiple myeloma, M-spike at diagnosis 2.4,  kappa restricted, with diffuse lytic lesions.      HISTORY OF PRESENT ILLNESS: The patient is a very pleasant 70-year-old  gentleman with past medical history significant for multiple myeloma diagnosed  11/26/2013. The patient presented with back pain, had a CAT scan done that  showed multiple vertebral body fractures with lytic lesions. He had a T11  biopsy done that came back with plasmocytosis compatible with multiple myeloma.  The patient was referred to me on 12/03/2013. I did a bone marrow biopsy that  came back consistent with multiple myeloma. Cytogenics at this point are still  pending. Whole body bone survey showed multiple bony lytic lesions affecting  right humerus, vertebral bodies, as well as pelvic bones. The patient had  serum protein electrophoresis done at diagnosis that showed monoclonal protein  of 2.5 grams plus 0.5 grams, kappa restricted. 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 The  patient was started on Revlimid 1.2 mg/sq m subcu once weekly 2 weeks on and 1  week off, Velcade 25 mg p.o. daily 2 weeks on and 1 week off, and dexamethasone  40 mg p.o. weekly on 12/09/2013. The patient completed cycle #6 on 04/07/2014.  The patient received autologous stem cell transplant with Dr. Bry Johnson at  [facility] on 05/13/2014. His day 100 was 08/26/2014. He had serum  protein electrophoresis that failed to show any evidence of monoclonal protein,  serum free light chain ratio was slightly elevated. The patient was started on  Revlimid 15 mg p.o. daily 2 weeks on and 1 week off on 06/16/2015. The patient's therapy was changed to Ninlaro 4 mg weekly 3 weeks on and 1 week off due to rising kappa lambda ratio. His kappa lambda ration continued rise of Ninlaro and he was started on Decadron 40 mg weekly with Revlimid 25 mg 2  weeks on and 1 week off on 4/14/2017.  His serum free light chain started to increase and treatment was switched to Carfilzumab,Revlimid with dexamethasone , started on April 3, 2018.  The patient is here today for a scheduled followup visit.    SUBJECTIVE: Mr. Ramirez is here today with his wife.  Continues to complain of shortness of breath with minimal activity and non productive cough.  He continues to complain of fatigue.  Also reports that each time he receives his carfilzomib infusion his lower lip goes numb, he also continues to have episodes of numbness in his lip intermittently for several days after the infusion.  Denies any swelling of his tongue, no associated breathlessness.  Peripheral neuropathy is stable.  He is having increased pain across his upper back from shoulder blade to shoulder blade.  Takes Tylenol with moderate relief, occasionally takes hydrocodone.    PAST MEDICAL HISTORY/SOCIAL HISTORY/FAMILY HISTORY: Unchanged from my prior documentation done on 12/03/2013.    Review of Systems   Constitutional: Positive for fatigue. Negative for activity change, appetite change, chills, fever and unexpected weight change.   HENT: Negative for hearing loss, mouth sores, nosebleeds, sore throat and trouble swallowing.    Eyes: Negative for visual disturbance.   Respiratory: Negative for cough, chest tightness, shortness of breath and wheezing.    Cardiovascular: Negative for chest pain, palpitations and leg swelling.   Gastrointestinal: Negative for abdominal distention, abdominal pain, blood in stool, constipation, diarrhea, nausea, rectal pain and vomiting.   Endocrine: Negative for cold intolerance and heat intolerance.   Genitourinary: Negative for difficulty urinating, dysuria, frequency and urgency.   Musculoskeletal: Positive for arthralgias and back pain. Negative for gait problem, joint swelling and myalgias.   Skin: Negative for rash.   Neurological: Positive for numbness. Negative for  dizziness, tremors, syncope, weakness, light-headedness and headaches.   Hematological: Negative for adenopathy. Does not bruise/bleed easily.   Psychiatric/Behavioral: Negative for confusion, sleep disturbance and suicidal ideas. The patient is not nervous/anxious.          Current Outpatient Prescriptions:   •  FLUoxetine (PROzac) 10 MG capsule, Take 10 mg by mouth Daily., Disp: , Rfl:   •  MELATONIN PO, Take 2 mg by mouth Every Evening., Disp: , Rfl:   •  acyclovir (ZOVIRAX) 400 MG tablet, Take 1 tablet by mouth 2 (Two) Times a Day., Disp: 60 tablet, Rfl: 11  •  aspirin 81 MG tablet, Take 81 mg by mouth Daily., Disp: , Rfl:   •  azelastine (ASTELIN) 0.1 % nasal spray, into each nostril 2 (two) times a day., Disp: , Rfl:   •  baclofen (LIORESAL) 10 MG tablet, TK 1 - 2 TS QHS, Disp: , Rfl: 2  •  Calcium Carb-Cholecalciferol (CALCIUM + D3) 600-200 MG-UNIT tablet, Take  by mouth., Disp: , Rfl:   •  calcium carbonate (TUMS) 500 MG chewable tablet, Chew 1 tablet Daily., Disp: , Rfl:   •  carfilzomib (KYPROLIS) 30 MG chemo syringe, 60 mg., Disp: , Rfl:   •  cyclobenzaprine (FLEXERIL) 5 MG tablet, Take 5 mg by mouth As Needed for Muscle Spasms., Disp: , Rfl:   •  dexamethasone (DECADRON) 4 MG tablet, TAKE 10 TABLETS BY MOUTH WITH BREAKFAST, ON DAYS 1, 8, 15 AND 22, Disp: 40 tablet, Rfl: 5  •  docusate sodium (DOCQLACE) 100 MG capsule, Take  by mouth., Disp: , Rfl:   •  folic acid (FOLVITE) 1 MG tablet, TK 1 T PO QD, Disp: , Rfl: 5  •  gabapentin (NEURONTIN) 300 MG capsule, TAKE 1 CAPSULE BY MOUTH THREE TIMES DAILY, Disp: 90 capsule, Rfl: 0  •  Glucosamine-Chondroitin (OSTEO BI-FLEX REGULAR STRENGTH) 250-200 MG tablet, Take 1 tablet by mouth 2 (Two) Times a Day., Disp: , Rfl:   •  K-PHOS-NEUTRAL 155-852-130 MG tablet, TAKE 1 TABLET BY MOUTH DAILY, Disp: 30 tablet, Rfl: 5  •  levETIRAcetam (KEPPRA) 1000 MG tablet, TAKE 1 1/2 TABLET BY MOUTH TWICE DAILY, Disp: 90 tablet, Rfl: 0  •  lidocaine (LMX) 4 % cream, Apply   "topically As Needed for Mild Pain ., Disp: , Rfl:   •  loratadine (CLARITIN) 10 MG tablet, Take  by mouth., Disp: , Rfl:   •  methylphenidate (CONCERTA) 36 MG CR tablet, Take 36 mg by mouth Every Morning  , Disp: , Rfl:   •  ondansetron (ZOFRAN) 8 MG tablet, TAKE 1 TABLET BY MOUTH THREE TIMES DAILY FOR NAUSEA AND VOMITING, Disp: 30 tablet, Rfl: 5  •  pantoprazole (PROTONIX) 40 MG EC tablet, Take 1 tablet by mouth Daily., Disp: 30 tablet, Rfl: 5  •  Probiotic Product (PROBIOTIC PO), Take 1 tablet by mouth 2 (Two) Times a Day As Needed., Disp: , Rfl:   •  Simethicone (GAS-X PO), Take  by mouth., Disp: , Rfl:   •  sulfamethoxazole-trimethoprim (BACTRIM DS,SEPTRA DS) 800-160 MG per tablet, TAKE 1 TABLET PO BID ON SATURDAYS AND SUNDAYS FOR PROPHYLAXIS, Disp: , Rfl: 3  •  sulfamethoxazole-trimethoprim (BACTRIM DS,SEPTRA DS) 800-160 MG per tablet, TAKE 1 TABLET BY MOUTH TWICE DAILY ON SATURDAYS AND SUNDAYS FOR PROPHYLAXIS, Disp: 60 tablet, Rfl: 5  •  Valerian Root 450 MG capsule, Take  by mouth., Disp: , Rfl:   •  vitamin B-12 (CYANOCOBALAMIN) 500 MCG tablet, Take 500 mcg by mouth Daily., Disp: , Rfl:   •  warfarin (COUMADIN) 5 MG tablet, Take 5 mg by mouth As Needed., Disp: , Rfl:   No current facility-administered medications for this visit.     Facility-Administered Medications Ordered in Other Visits:   •  iopamidol (ISOVUE-370) 76 % injection 100 mL, 100 mL, Intravenous, Once in imaging, CHRISTY Lafleur  •  sodium chloride 0.9 % infusion 250 mL, 250 mL, Intravenous, Once, Susan Coronado, APRN    PHYSICAL EXAMINATION:   /70 Comment: LUE  Pulse 100   Temp 97.5 °F (36.4 °C) (Temporal Artery )   Resp 20   Ht 177.8 cm (70\")   Wt 131 kg (289 lb)   SpO2 90% Comment: RA- Walking  BMI 41.47 kg/m²    ECOG Performance Status: 1 - Symptomatic but completely ambulatory  General Appearance:  alert, cooperative, no apparent distress and appears stated age   Neurologic/Psychiatric: A&O x 3, gait steady, appropriate " affect, strength 5/5 in all muscle groups   HEENT:  Normocephalic, without obvious abnormality, mucous membranes moist   Neck: Supple, symmetrical, trachea midline, no adenopathy;  No thyromegaly, masses, or tenderness   Lungs:   Clear to auscultation bilaterally; respirations regular, even, and unlabored bilaterally   Heart:  Regular rate and rhythm, no murmurs appreciated   Abdomen:   Soft, non-tender, non-distended and no organomegaly   Lymph nodes: No cervical, supraclavicular, inguinal or axillary adenopathy noted   Extremities: Normal, atraumatic; no clubbing, cyanosis, or edema    Skin: No rashes, ulcers, or suspicious lesions noted     Lab on 07/24/2018   Component Date Value Ref Range Status   • Protime 07/24/2018 51.6* 9.6 - 11.5 Seconds Final   • INR 07/24/2018 4.91* 0.91 - 1.09 Final   • WBC 07/24/2018 5.50  3.50 - 10.80 10*3/mm3 Final   • RBC 07/24/2018 3.67* 4.20 - 5.76 10*6/mm3 Final   • Hemoglobin 07/24/2018 11.2* 13.1 - 17.5 g/dL Final   • Hematocrit 07/24/2018 35.8* 38.9 - 50.9 % Final   • RDW 07/24/2018 19.8* 11.3 - 14.5 % Final   • MCV 07/24/2018 97.6  80.0 - 99.0 fL Final   • MCH 07/24/2018 30.4  27.0 - 31.0 pg Final   • MCHC 07/24/2018 31.2* 32.0 - 36.0 g/dL Final   • MPV 07/24/2018 9.0  6.0 - 12.0 fL Final   • Platelets 07/24/2018 72* 150 - 450 10*3/mm3 Final   • Neutrophil % 07/24/2018 74.3* 41.0 - 71.0 % Final   • Lymphocyte % 07/24/2018 17.1* 24.0 - 44.0 % Final   • Monocyte % 07/24/2018 8.6  0.0 - 12.0 % Final   • Neutrophils, Absolute 07/24/2018 4.10  1.50 - 8.30 10*3/mm3 Final   • Lymphocytes, Absolute 07/24/2018 0.90  0.60 - 4.80 10*3/mm3 Final   • Monocytes, Absolute 07/24/2018 0.50  0.00 - 1.00 10*3/mm3 Final   Lab on 07/18/2018   Component Date Value Ref Range Status   • Glucose 07/18/2018 152* 70 - 100 mg/dL Final   • BUN 07/18/2018 23  9 - 23 mg/dL Final   • Creatinine 07/18/2018 0.92  0.60 - 1.30 mg/dL Final   • Sodium 07/18/2018 143  132 - 146 mmol/L Final   • Potassium  07/18/2018 3.6  3.5 - 5.5 mmol/L Final   • Chloride 07/18/2018 110* 99 - 109 mmol/L Final   • CO2 07/18/2018 25.0  20.0 - 31.0 mmol/L Final   • Calcium 07/18/2018 8.6* 8.7 - 10.4 mg/dL Final   • Total Protein 07/18/2018 5.6* 5.7 - 8.2 g/dL Final   • Albumin 07/18/2018 3.86  3.20 - 4.80 g/dL Final   • ALT (SGPT) 07/18/2018 18  7 - 40 U/L Final   • AST (SGOT) 07/18/2018 17  0 - 33 U/L Final   • Alkaline Phosphatase 07/18/2018 62  25 - 100 U/L Final   • Total Bilirubin 07/18/2018 0.6  0.3 - 1.2 mg/dL Final   • eGFR Non  Amer 07/18/2018 81  >60 mL/min/1.73 Final   • Globulin 07/18/2018 1.7  gm/dL Final   • A/G Ratio 07/18/2018 2.2  1.5 - 2.5 g/dL Final   • BUN/Creatinine Ratio 07/18/2018 25.0  7.0 - 25.0 Final   • Anion Gap 07/18/2018 8.0  3.0 - 11.0 mmol/L Final   • WBC 07/18/2018 4.50  3.50 - 10.80 10*3/mm3 Final   • RBC 07/18/2018 3.62* 4.20 - 5.76 10*6/mm3 Final   • Hemoglobin 07/18/2018 10.9* 13.1 - 17.5 g/dL Final   • Hematocrit 07/18/2018 35.1* 38.9 - 50.9 % Final   • RDW 07/18/2018 18.7* 11.3 - 14.5 % Final   • MCV 07/18/2018 97.0  80.0 - 99.0 fL Final   • MCH 07/18/2018 30.0  27.0 - 31.0 pg Final   • MCHC 07/18/2018 31.0* 32.0 - 36.0 g/dL Final   • MPV 07/18/2018 9.0  6.0 - 12.0 fL Final   • Platelets 07/18/2018 104* 150 - 450 10*3/mm3 Final   • Neutrophil % 07/18/2018 65.3  41.0 - 71.0 % Final   • Lymphocyte % 07/18/2018 25.4  24.0 - 44.0 % Final   • Monocyte % 07/18/2018 9.3  0.0 - 12.0 % Final   • Neutrophils, Absolute 07/18/2018 2.90  1.50 - 8.30 10*3/mm3 Final   • Lymphocytes, Absolute 07/18/2018 1.10  0.60 - 4.80 10*3/mm3 Final   • Monocytes, Absolute 07/18/2018 0.40  0.00 - 1.00 10*3/mm3 Final   Infusion on 07/11/2018   Component Date Value Ref Range Status   • WBC 07/11/2018 4.90  3.50 - 10.80 10*3/mm3 Final   • RBC 07/11/2018 3.31* 4.20 - 5.76 10*6/mm3 Final   • Hemoglobin 07/11/2018 10.3* 13.1 - 17.5 g/dL Final   • Hematocrit 07/11/2018 32.1* 38.9 - 50.9 % Final   • RDW 07/11/2018 19.1* 11.3 -  14.5 % Final   • MCV 07/11/2018 96.9  80.0 - 99.0 fL Final   • MCH 07/11/2018 31.1* 27.0 - 31.0 pg Final   • MCHC 07/11/2018 32.0  32.0 - 36.0 g/dL Final   • MPV 07/11/2018 8.4  6.0 - 12.0 fL Final   • Platelets 07/11/2018 124* 150 - 450 10*3/mm3 Final   • Neutrophil % 07/11/2018 68.8  41.0 - 71.0 % Final   • Lymphocyte % 07/11/2018 22.6* 24.0 - 44.0 % Final   • Monocyte % 07/11/2018 8.6  0.0 - 12.0 % Final   • Neutrophils, Absolute 07/11/2018 3.40  1.50 - 8.30 10*3/mm3 Final   • Lymphocytes, Absolute 07/11/2018 1.10  0.60 - 4.80 10*3/mm3 Final   • Monocytes, Absolute 07/11/2018 0.40  0.00 - 1.00 10*3/mm3 Final        No results found.    ASSESSMENT: The patient is a very pleasant 71-year-old gentleman with new  diagnosis of multiple myeloma.    PROBLEM LIST:  1. Multiple myeloma, kappa restricted, M-spike 2.4 at diagnosis, ISS score  stage 2 with normal albumin but beta-2 microglobulin level of 4.1.   2. 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   3. Completed 6 cycles of Revlimid, Velcade, and dexamethasone from 12/09/2013  until 04/07/2014.  4. Received palliative radiation to T11, 4 treatments, completed 12/11/2013.   5. Status post autologous stem cell transplant done at [facility]  with Dr. Bry Johnson 05/13/2014.  6. Relapsed disease documented with elevated kappa lambda ratio with minute  amount of monoclonal protein done on 06/09/2015.   7. Bilateral pulmonary embolism, deep venous thrombosis 07/2014 on chronic  anticoagulation with Coumadin.  8. Chronic kidney disease Stage II.   9. 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   10. Started Revlimid 25 mg p.o. daily 2 weeks on and 1 week off on 06/16/2015.  11. Switched to Ninlaro on 02/21/2017 Secondary to increase in SFL ratio.  12. Added Decadron 40 mg weekly with Revlimid 25 mg 2 weeks on 1 week off on 4/14/2017  secondary to rising SFL ration on Ninlaro alone.   13.  Treatment switched to Carfilzumab,Revlimid with dexamethasone , started on April 3, 2018, status post 1 cycle  14. Leukopenia and thrombocytopenia.   15. Peripheral neuropathy  16.  Drug induced hepatitis  17. Partial seizure disorder.    PLAN:  1. I will hold his treatment as scheduled today with Decadron 20 mg weekly with Revlimid 25 mg 3 weeks on 1 week off, with Kyprolis 2 consecutive days weekly 3 weeks on and 1 week off.  Cycle #3 due to his increasing shortness of breath and get CT chest as Carfilzomib can cause pulmonary toxicities.  His light chains are continuing to rise, will repeat today.   2.  I will continue to monitor patient blood work including blood counts kidney function and liver enzymes..   3.  I will repeat serum free light chains on day 1 of each cycle.  This would be drawn today.  His light chains are continuing to rise.  Most recent immunoglobulin free light chains with elevated free kappa light chains 1067.7, free lambda light chains 3.1, kappa/lambda ratio 344.42  (previous kappa/lambda ratio 172.65).   4. The patient will come back to see me Dr. Segovia next week to go over CT results and labs and discuss further plan of care.   5. We will continue folic acid daily. I am suspecting his leukopenia and thrombocytopenia is secondary to Depakote Revlimid and Keppra.   6. I will continue warfarin for history of DVT with monoclonal gammopathy.  Will check INR today.  7.  The patient is still on Neurontin for peripheral neuropathy. He will follow up with his neurologist regarding management.   8.  I reviewed the potential side effects of Kyprolis and Revlimid including but not limited to peripheral neuropathy, edema, rash, diarrhea, low blood counts,hepatic insufficiency, and potential death.   9.  The patient will continue to follow up with Dr. Reyes at [facility] regarding whether the patient may be a candidate for a second  autologous stem cell transplant.  10.  I will continue the patient on Bactrim DS twice a day on Saturdays and Sundays for prophylaxis since patient was given high-dose steroids.  11.  I will continue Protonix 40 mg daily for GI prophylaxis.  12.  The patient will continue Zometa 4 mg IV every 6 weeks with calcium and vitamin D due to mild diffuse lytic lesions seen on skeletal survey done 2/21/2017.    13.  I will continue patient on tramadol 50 mg daily at bedtime as needed for cancer-related pain.  14.  I'll continue patient on Acyclovir 400 mg twice a day for HSV prophylaxis.    Jammie Marte, APRN    07/24/2018

## 2018-07-30 NOTE — PROGRESS NOTES
Discussed office visit with Jammie on 7/27 with Dr Segovia, and he wants patient to start daratumumab, pomalyst, and dex starting next week.  Will see patient as scheduled on Friday, and have scheduling set him up for education with NP and treatment for next week.

## 2018-07-31 NOTE — TELEPHONE ENCOUNTER
----- Message from Qing Dewey sent at 7/31/2018  1:25 PM EDT -----  Regarding: BADIN-REFILL  Contact: [phone]  Patient called and he called to get his refill on the Revlamid, but this time they said it had been changed to Pomalist? Please call he wants to make sure.

## 2018-07-31 NOTE — TELEPHONE ENCOUNTER
Patient informed that dr rg will be changing his treatment and will discuss further on Friday at follow up appt.  Patient had no questions at this time regarding treatment change, and will keep appt on friday

## 2018-08-02 NOTE — TELEPHONE ENCOUNTER
----- Message from Audrey Lynn sent at 8/2/2018  8:18 AM EDT -----  Regarding: BADIN - JAW PAIN   Contact: [phone]  PATIENT CALLED AND SAID HE HAD SO MUCH JAW PAIN LAST NIGHT, HE HAD TO TAKE A PERCOCET IN ORDER TO REST.     HIS DENTIST IS WORKING HIM IN TODAY. HE WANTED TO LET YOU ALL KNOW WHAT HE WAS DOING.

## 2018-08-03 NOTE — PROGRESS NOTES
DATE OF VISIT: 8/3/2018    REASON FOR VISIT: Followup for multiple myeloma, M-spike at diagnosis 2.4, kappa restricted, with diffuse lytic lesions.       HISTORY OF PRESENT ILLNESS: The patient is a very pleasant 72 y.o. male gentleman with past medical history significant for multiple myeloma diagnosed  11/26/2013. The patient presented with back pain, had a CAT scan done that showed multiple vertebral body fractures with lytic lesions. He had a T11 biopsy done that came back with plasmocytosis compatible with multiple myeloma. The patient was referred to me on 12/03/2013. I did a bone marrow biopsy that  came back consistent with multiple myeloma. Cytogenics at this point are still pending. Whole body bone survey showed multiple bony lytic lesions affecting  right humerus, vertebral bodies, as well as pelvic bones. The patient had serum protein electrophoresis done at diagnosis that showed monoclonal protein  of 2.5 grams plus 0.5 grams, kappa restricted. 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 The  patient was started on Revlimid 1.2 mg/sq m subcu once weekly 2 weeks on and 1 week off, Velcade 25 mg p.o. daily 2 weeks on and 1 week off, and dexamethasone 40 mg p.o. weekly on 12/09/2013. The patient completed cycle #6 on 04/07/2014.  The patient received autologous stem cell transplant with Dr. Bry Johnson at [facility] on 05/13/2014. His day 100 was 08/26/2014. He had serum  protein electrophoresis that failed to show any evidence of monoclonal protein, serum free light chain ratio was slightly elevated. The patient was started on  Revlimid 15 mg p.o. daily 2 weeks on and 1 week off on 06/16/2015. The patient's therapy was changed to Ninlaro 4 mg weekly 3 weeks on and 1 week off due to rising kappa lambda ratio. His kappa lambda ration continued rise of Ninlaro and he was started on Decadron 40 mg weekly with Revlimid 25 mg 2 weeks on and 1  week off on 4/14/2017.  His serum free light chain started to increase and treatment was switched to Carfilzumab,Revlimid with dexamethasone , started on April 3, 2018.   his monoclonal protein started to increase and treatment was changed to dexamethasone, Pomalyst, and Daratumumab will start on 08/06/18. The patient is here today for a scheduled followup visit.      SUBJECTIVE: The patient is here today by himself. He complains of jaw pain. He also complains of diarrhea and headaches. He denies any nausea. He also has some numbness in his upper lip area. He complains of shortness of breath.     PAST MEDICAL HISTORY/SOCIAL HISTORY/FAMILY HISTORY: Reviewed by me and unchanged from my documentation done on 06/26/18.    Review of Systems   Constitutional: Negative for activity change, appetite change, chills, fatigue, fever and unexpected weight change.   HENT: Negative for hearing loss, mouth sores, nosebleeds, sore throat and trouble swallowing.    Eyes: Negative for visual disturbance.   Respiratory: Positive for shortness of breath. Negative for cough, chest tightness and wheezing.    Cardiovascular: Negative for chest pain, palpitations and leg swelling.   Gastrointestinal: Positive for diarrhea. Negative for abdominal distention, abdominal pain, blood in stool, constipation, nausea, rectal pain and vomiting.   Endocrine: Negative for cold intolerance and heat intolerance.   Genitourinary: Negative for difficulty urinating, dysuria, frequency and urgency.   Musculoskeletal: Negative for arthralgias, back pain, gait problem, joint swelling and myalgias.        Jaw pain   Skin: Negative for rash.   Neurological: Positive for headaches. Negative for dizziness, tremors, syncope, weakness, light-headedness and numbness.   Hematological: Negative for adenopathy. Does not bruise/bleed easily.   Psychiatric/Behavioral: Negative for confusion, sleep disturbance and suicidal ideas. The patient is not nervous/anxious.           Current Outpatient Prescriptions:   •  acyclovir (ZOVIRAX) 400 MG tablet, Take 1 tablet by mouth 2 (Two) Times a Day., Disp: 60 tablet, Rfl: 11  •  acyclovir (ZOVIRAX) 400 MG tablet, Take 1 tablet by mouth 2 (Two) Times a Day., Disp: 60 tablet, Rfl: 11  •  aspirin 81 MG tablet, Take 81 mg by mouth Daily., Disp: , Rfl:   •  azelastine (ASTELIN) 0.1 % nasal spray, into each nostril 2 (two) times a day., Disp: , Rfl:   •  baclofen (LIORESAL) 10 MG tablet, TK 1 - 2 TS QHS, Disp: , Rfl: 2  •  Calcium Carb-Cholecalciferol (CALCIUM + D3) 600-200 MG-UNIT tablet, Take  by mouth., Disp: , Rfl:   •  calcium carbonate (TUMS) 500 MG chewable tablet, Chew 1 tablet Daily., Disp: , Rfl:   •  carfilzomib (KYPROLIS) 30 MG chemo syringe, 60 mg., Disp: , Rfl:   •  cyclobenzaprine (FLEXERIL) 5 MG tablet, Take 5 mg by mouth As Needed for Muscle Spasms., Disp: , Rfl:   •  dexamethasone (DECADRON) 4 MG tablet, TAKE 10 TABLETS BY MOUTH WITH BREAKFAST, ON DAYS 1, 8, 15 AND 22, Disp: 40 tablet, Rfl: 5  •  dexamethasone (DECADRON) 4 MG tablet, Take 10 tablets on Days 1,8,15,22 and 1 tablet on Days 2,3,9,10,16,17,23, and 24., Disp: 48 tablet, Rfl: 1  •  docusate sodium (DOCQLACE) 100 MG capsule, Take  by mouth., Disp: , Rfl:   •  FLUoxetine (PROzac) 10 MG capsule, Take 10 mg by mouth Daily., Disp: , Rfl:   •  folic acid (FOLVITE) 1 MG tablet, TK 1 T PO QD, Disp: , Rfl: 5  •  gabapentin (NEURONTIN) 300 MG capsule, TAKE 1 CAPSULE BY MOUTH THREE TIMES DAILY, Disp: 90 capsule, Rfl: 0  •  Glucosamine-Chondroitin (OSTEO BI-FLEX REGULAR STRENGTH) 250-200 MG tablet, Take 1 tablet by mouth 2 (Two) Times a Day., Disp: , Rfl:   •  K-PHOS-NEUTRAL 155-852-130 MG tablet, TAKE 1 TABLET BY MOUTH DAILY, Disp: 30 tablet, Rfl: 5  •  lenalidomide (REVLIMID) 25 MG capsule, Take 1 capsule by mouth Daily. For 21 days, followed by 7 days off. Adult Male REMS:[id number], Disp: 21 capsule, Rfl: 0  •  levETIRAcetam (KEPPRA) 1000 MG tablet, TAKE 1 1/2 TABLET BY MOUTH  TWICE DAILY, Disp: 90 tablet, Rfl: 0  •  lidocaine (LMX) 4 % cream, Apply  topically As Needed for Mild Pain ., Disp: , Rfl:   •  loratadine (CLARITIN) 10 MG tablet, Take  by mouth., Disp: , Rfl:   •  MELATONIN PO, Take 2 mg by mouth Every Evening., Disp: , Rfl:   •  methylphenidate (CONCERTA) 36 MG CR tablet, Take 36 mg by mouth Every Morning  , Disp: , Rfl:   •  ondansetron (ZOFRAN) 8 MG tablet, TAKE 1 TABLET BY MOUTH THREE TIMES DAILY FOR NAUSEA AND VOMITING, Disp: 30 tablet, Rfl: 5  •  ondansetron (ZOFRAN) 8 MG tablet, Take 1 tablet by mouth 3 (Three) Times a Day As Needed for Nausea or Vomiting., Disp: 30 tablet, Rfl: 5  •  pantoprazole (PROTONIX) 40 MG EC tablet, Take 1 tablet by mouth Daily., Disp: 30 tablet, Rfl: 5  •  pomalidomide (POMALYST) 4 MG chemo capsule, Take 1 capsule by mouth Daily. Take on an empty stomach, on days 1-21, then off for 7 days. [facility] [id number] Adult Male., Disp: 21 capsule, Rfl: 0  •  Probiotic Product (PROBIOTIC PO), Take 1 tablet by mouth 2 (Two) Times a Day As Needed., Disp: , Rfl:   •  Simethicone (GAS-X PO), Take  by mouth., Disp: , Rfl:   •  sulfamethoxazole-trimethoprim (BACTRIM DS,SEPTRA DS) 800-160 MG per tablet, TAKE 1 TABLET PO BID ON SATURDAYS AND SUNDAYS FOR PROPHYLAXIS, Disp: , Rfl: 3  •  sulfamethoxazole-trimethoprim (BACTRIM DS,SEPTRA DS) 800-160 MG per tablet, TAKE 1 TABLET BY MOUTH TWICE DAILY ON SATURDAYS AND SUNDAYS FOR PROPHYLAXIS, Disp: 60 tablet, Rfl: 5  •  sulfamethoxazole-trimethoprim (BACTRIM DS,SEPTRA DS) 800-160 MG per tablet, Take 1 tablet by mouth 3 (Three) Times a Week (Mondays, Wednesdays and Fridays)., Disp: 12 tablet, Rfl: 11  •  Valerian Root 450 MG capsule, Take  by mouth., Disp: , Rfl:   •  vitamin B-12 (CYANOCOBALAMIN) 500 MCG tablet, Take 500 mcg by mouth Daily., Disp: , Rfl:   •  warfarin (COUMADIN) 5 MG tablet, Take 5 mg by mouth As Needed., Disp: , Rfl:   No current facility-administered medications for this visit.     Facility-Administered  "Medications Ordered in Other Visits:   •  sodium chloride 0.9 % infusion 250 mL, 250 mL, Intravenous, Once, Susan Coronado, APRN    PHYSICAL EXAMINATION:   /78   Pulse 82   Temp 97.8 °F (36.6 °C) (Temporal Artery )   Resp 23   Ht 177.8 cm (70\")   Wt 129 kg (283 lb 4.8 oz)   SpO2 98%   BMI 40.65 kg/m²    ECOG Performance Status: 1 - Symptomatic but completely ambulatory  General Appearance:  alert, cooperative, no apparent distress and appears stated age   Neurologic/Psychiatric: A&O x 3, gait steady, appropriate affect, strength 5/5 in all muscle groups   HEENT:  Normocephalic, without obvious abnormality, mucous membranes moist   Neck: Supple, symmetrical, trachea midline, no adenopathy;  No thyromegaly, masses, or tenderness   Lungs:   Clear to auscultation bilaterally; respirations regular, even, and unlabored bilaterally   Heart:  Regular rate and rhythm, no murmurs appreciated   Abdomen:   Soft, non-tender, non-distended and no organomegaly   Lymph nodes: No cervical, supraclavicular, inguinal or axillary adenopathy noted   Extremities: Normal, atraumatic; no clubbing, cyanosis, or edema    Skin: No rashes, ulcers, or suspicious lesions noted     Lab on 07/24/2018   Component Date Value Ref Range Status   • Free Light Chain, Kappa 07/24/2018 1020.1* 3.3 - 19.4 mg/L Final   • Free Lambda Light Chains 07/24/2018 2.5* 5.7 - 26.3 mg/L Final   • Kappa/Lambda Ratio 07/24/2018 408.04* 0.26 - 1.65 Final   • Protime 07/24/2018 51.6* 9.6 - 11.5 Seconds Final   • INR 07/24/2018 4.91* 0.91 - 1.09 Final   • WBC 07/24/2018 5.50  3.50 - 10.80 10*3/mm3 Final   • RBC 07/24/2018 3.67* 4.20 - 5.76 10*6/mm3 Final   • Hemoglobin 07/24/2018 11.2* 13.1 - 17.5 g/dL Final   • Hematocrit 07/24/2018 35.8* 38.9 - 50.9 % Final   • RDW 07/24/2018 19.8* 11.3 - 14.5 % Final   • MCV 07/24/2018 97.6  80.0 - 99.0 fL Final   • MCH 07/24/2018 30.4  27.0 - 31.0 pg Final   • MCHC 07/24/2018 31.2* 32.0 - 36.0 g/dL Final   • MPV " 07/24/2018 9.0  6.0 - 12.0 fL Final   • Platelets 07/24/2018 72* 150 - 450 10*3/mm3 Final   • Neutrophil % 07/24/2018 74.3* 41.0 - 71.0 % Final   • Lymphocyte % 07/24/2018 17.1* 24.0 - 44.0 % Final   • Monocyte % 07/24/2018 8.6  0.0 - 12.0 % Final   • Neutrophils, Absolute 07/24/2018 4.10  1.50 - 8.30 10*3/mm3 Final   • Lymphocytes, Absolute 07/24/2018 0.90  0.60 - 4.80 10*3/mm3 Final   • Monocytes, Absolute 07/24/2018 0.50  0.00 - 1.00 10*3/mm3 Final        Xr Ribs Bilateral 3 View    Result Date: 7/11/2018  Narrative: EXAMINATION: XR RIBS BILATERAL 3 VW-  INDICATION: C90.00-Multiple myeloma not having achieved remission.  COMPARISON: None.  FINDINGS: Four views of the ribs bilaterally reveal atelectatic changes seen at the left lung base. Lung volumes are low. Minimal degenerative change is seen within the spine. There is no definite acute right or left-sided rib fracture identified. There is no cortical irregularity to suggest a displaced fracture. There is a healing fracture or healed fractures seen of the left posterior lateral 10th rib.         Impression: Atelectatic changes identified within the left lung base with healed rib fracture or healing rib fractures seen of the left posterior lateral 10th rib.  D:  07/11/2018 E:  07/11/2018  This report was finalized on 7/11/2018 1:30 PM by Dr. Clara Sarmiento MD.      Ct Chest With Contrast    Result Date: 7/24/2018  Narrative: EXAMINATION: CT CHEST WITH CONTRAST-07/24/2018:  INDICATION: Patient with multiple myeloma, worsening dyspnea and back pain, on carfilzomib.; C90.02-Multiple myeloma in relapse; R06.02-Shortness of breath; G89.3-Neoplasm related pain (acute) (chronic).  TECHNIQUE:  Axial CT data of the chest were acquired helically with contrast.  Multiplanar reformatted images were generated and reviewed.   The radiation dose reduction device was turned on for each scan per the ALARA (As Low as Reasonably Achievable) protocol.  COMPARISONS:  11/25/2013.   FINDINGS:  Lytic lesions are seen at T4, T5, T7, T8, T11, T12, L1-L4. Fracture of the right third rib at the costo-cartilaginous junction (axial series image #25) with surrounding soft tissue density. Similar appearing fractures of the lateral right right fourth (image 22) and anterior right fifth rib (image 36) are visible. Old healed fracture with callus formation posterolateral left tenth rib.  Patent central airways. Normal sized heart. Lingular scarring. No focal airspace disease, pleural effusion or pneumothorax. Basal subsegmental atelectasis on the left. Incidental imaging of the upper abdomen is unrevealing.      Impression: 1. Numerous old lytic lesions in the spine with a few thoracolumbar compression fractures that are not changed. 2. There are unhealed fractures of the right third, fourth and fifth ribs with surrounding soft tissue density. Concern is for myelomatous involvement and pathologic fracture. 3. Sclerotic, chronic left tenth rib fracture is new since at least 2013.  D:  07/24/2018 E:  07/24/2018   This report was finalized on 7/24/2018 4:04 PM by Tobias Castillo.      Xr Chest Pa & Lateral    Result Date: 7/11/2018  Narrative: EXAMINATION: XR CHEST PA AND LATERAL- 07/11/2018  INDICATION: R05-Cough  COMPARISON: 04/19/2017  FINDINGS: PA and lateral views of the chest reveal cardiac silhouette to be borderline enlarged. Mild increased markings at the left lung base unchanged in the interval suggesting mild chronic change. No acute parenchymal disease. No pleural effusion or pneumothorax. Degenerative changes seen within the spine. Pulmonary vascularity is within normal limits.      Impression: Mild atelectatic changes identified left lung base stable when compared to the prior study with no evidence of acute parenchymal disease. Lung volumes are low.      D:  07/11/2018 E:  07/11/2018  This report was finalized on 7/11/2018 1:30 PM by Dr. Calra Sarmiento MD.        ASSESSMENT: The patient is a  very pleasant 72 y.o. male  With new diagnosis of multiple myeloma.    PROBLEM LIST:  1. Multiple myeloma, kappa restricted, M-spike 2.4 at diagnosis, ISS score  stage 2 with normal albumin but beta-2 microglobulin level of 4.1.   2. 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   3. Completed 6 cycles of Revlimid, Velcade, and dexamethasone from 12/09/2013  until 04/07/2014.  4. Received palliative radiation to T11, 4 treatments, completed 12/11/2013.   5. Status post autologous stem cell transplant done at [facility]  with Dr. Bry Johnson 05/13/2014.  6. Relapsed disease documented with elevated kappa lambda ratio with minute  amount of monoclonal protein done on 06/09/2015.   7. Bilateral pulmonary embolism, deep venous thrombosis 07/2014 on chronic  anticoagulation with Coumadin.  8. Chronic kidney disease Stage II.   9. 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   10. Started Revlimid 25 mg p.o. daily 2 weeks on and 1 week off on 06/16/2015.  11. Switched to Ninlaro on 02/21/2017 Secondary to increase in SFL ratio.  12. Added Decadron 40 mg weekly with Revlimid 25 mg 2 weeks on 1 week off on 4/14/2017 secondary to rising SFL ration on Ninlaro alone.   13.  Treatment switched to Carfilzumab,Revlimid with dexamethasone , started on April 3, 2018, status post 1 cycle  14.  Will start August 6 2018 Decadron 20 mg weekly with pomalyst 4 mg daily 3 weeks on 1 week off, with Daratumumab every other week   15. Peripheral neuropathy  16.  Drug induced hepatitis  17. Partial seizure disorder.  18.Leukopenia and thrombocytopenia.     PLAN:  1. I will change treatment to Decadron 20 mg weekly with pomalyst 4 mg daily 3 weeks on 1 week off, with Daratumumab every other week.  This will start next week.  2.  I will continue to monitor patient blood work including blood counts kidney  function and liver enzymes.  3.  I will repeat serum free light chains on day 1 of each cycle.    4. The patient will come back to see me in 4 weeks with blood work and treatment.  5. We will continue folic acid daily. I am suspecting his leukopenia and thrombocytopenia is secondary to Depakote Revlimid and Keppra.   6. I will continue warfarin for history of DVT with monoclonal gammopathy.   7.  The patient is still on Neurontin for peripheral neuropathy. He will follow up with his neurologist regarding management.   8.  I reviewed the potential side effects of the new regimen  including but not limited to peripheral neuropathy, edema, rash, diarrhea, low blood counts,hepatic insufficiency, and potential death.   9.  The patient will continue to follow up with Dr. Reyes at [facility] regarding whether the patient may be a candidate for a second autologous stem cell transplant.  10.  I will continue the patient on Bactrim DS twice a day on Saturdays and Sundays for prophylaxis since patient was given high-dose steroids.  11.  I will continue Protonix 40 mg daily for GI prophylaxis.  12.  The patient will continue Zometa 4 mg IV every 6 weeks with calcium and vitamin D due to mild diffuse lytic lesions seen on skeletal survey done 2/21/2017.    13.  I will continue patient on tramadol 50 mg daily at bedtime as needed for cancer-related pain.  14.  I'll continue patient on Acyclovir 400 mg twice a day for HSV prophylaxis.  15.  I will order MRI brain with and without contrast to further evaluate his facial numbness with headaches    Rosalie Segovia MD  8/3/2018

## 2018-08-06 NOTE — TELEPHONE ENCOUNTER
----- Message from Johnny Ramirez sent at 8/5/2018 10:22 AM EDT -----  Regarding: Non-Urgent Medical Question  Contact: [phone]  Janak FROST  I have a new problem (about a week old): numbness along my lower jaw area on the left side and a very very painful burning in the jawbone that is keeping me from eating or drinking anything but room temp water.  I'm scheduled for an MRI at 5:15 Tuesday at [facility].  Wednesday I go to another dentist at 10:30 to see if he can figure it out.  Wednesday afternoon I have a Chemo Teach at 2:30.  Thursday I begin new all-day chemo infusion.      Would it be possible to come see you Friday? Or Monday?      The numbness doesn't prevent my face from being extremely sensitive--laying my hand lightly on my beard is painful.  Percocet helps w pain. Big dose of Tylenol does also.  I also seem to have a persistent low level headache.

## 2018-08-07 NOTE — PROGRESS NOTES
Creatinine elevated to 4.3. Pt states has been staying relatively well hydrated and is not taking ibuprofen.  Will have him recheck tomorrow to make sure this is accurate.  Will call him with results

## 2018-08-07 NOTE — PROGRESS NOTES
Subjective   Johnny Ramirez is a 72 y.o. male.     Chief Complaint   Patient presents with   • Localization-related symptomatic epilepsy and epileptic synd       History of Present Illness   Pt f/b Dr Sharma for neuropathy and headaches, first seen here 2/16 for possible stroke: difficulty with speech, tongue numb. No limb sx. A few days later developed repetitive jerking in left lower face, later noted on VEEG to persist in sleep, and decreased with Ativan and LVT at ER, stopped completely with VPA. On coumadin for chronic PE, and has multiple myeloma. Saw Dr Arhcer who concurred with dx of simple partial szs.  Brain MRI showed area of increased T2 signal in the posterior superior right frontal, nonenhancing, which appears on diffusion images to show T2 shine through, not clearly seen on ADC mapping. Had echo, on asa, CTA OK.  Was on 500 mg bid of both Depakote and Keppra.   Then had a few twitches one morning; Depakote level was 40, planned to increase to 1500 mg.  MRI 6/27 showed resolution of the right subcortical lesion.  LVT 6.6, VPA 73 (on 1000mg and 1500mg respectively).  Called subsequently due to low blood counts and cut back Depakote to 1000mg. Then tiny jump in left upper lip with stress. Quit stressful activity and it went away. Trying acupuncture for foot pain. Also takes GBP. Has been on TPM for a while for migraines, in remission. Planned: taper off TPM. Can try decreasing GBP.  12/16: Gets pain on bottom of feet, that feels raw/stung, top of toes sting. Can't bear socks. Tremor occurs with more than one cup of decaffeinated coffee.     Tx with Velcade in spring of 2014, then revlimid until tx with stem cell tx. Restarted revlimid summer 2015.   Had NCS with Dr Morgan 6/15, showed mild-mod axonal neuropathy. Repeat NCS/EMG 12/16 with Dr. 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  Over time foot sx have  gradually worsened, with pain as main sx. CSF protein was 34 in March 2016. No seizures.  10/17: some changes in chemo meds: Ninlaro, Revlimid and dexamethasone.  LFTs were elevated, coming back down (reviewed). Stopped tylenol. Feet not too bad lately. GBP works as long as remembers to take it tid.  Tremor of hands is very bothersome, can't use his hands effectively. Taking 1/2 LVT 1000mg bid, and [id number] VPA ER at night. Planned taper of VPA, starting level was 45.   4/18: no seizures whatsoever on LVT 500mg bid alone -- successful slow taper off VPA. Tremor is better, can write better now, foot pain about the same. Was on warfarin (for DVT and PE) at time of seizure onset.    Today: new sx, burning pain in left jaw. Initially noted eg 30 min episodes of numbness in left face, like dentist. Initially noted during infusion, then intermittent at other times, then became permanent. After couple mos developed severe pain as well, using percocet he had from past (helps after about a half hour). Pain just builds without medication. Was previously worse with eg touching beard in that area. Also triggered or worsened by hot or cold food, having room temperature water and Boost, has lost about 25lb. Talking may be a trigger as well. Seems a little better, in that area involved a little slower. Seems to come on around 2pm and 6pm. 2pm episodes fading, but still getting around 6pm. MRI scheduled for this afternoon. Saw one dentist, scheduled for second opinion tomorrow. Has never had this before. No limb sx, but maybe balance a little worse. Getting headaches at night, often eg 10pm, like a cap on head, not terrible, seems to go away in daytime.     Allergies   Allergen Reactions   • Other Unknown (See Comments)     mold   • Trichophyton Unknown (See Comments)     unknown       Current Outpatient Prescriptions on File Prior to Visit   Medication Sig Dispense Refill   • acyclovir (ZOVIRAX) 400 MG tablet Take 1 tablet by mouth  2 (Two) Times a Day. 60 tablet 11   • aspirin 81 MG tablet Take 81 mg by mouth Daily.     • azelastine (ASTELIN) 0.1 % nasal spray into each nostril 2 (two) times a day.     • baclofen (LIORESAL) 10 MG tablet TK 1 - 2 TS QHS  2   • Calcium Carb-Cholecalciferol (CALCIUM + D3) 600-200 MG-UNIT tablet Take  by mouth.     • calcium carbonate (TUMS) 500 MG chewable tablet Chew 1 tablet Daily.     • carfilzomib (KYPROLIS) 30 MG chemo syringe 60 mg.     • dexamethasone (DECADRON) 4 MG tablet Take 10 tablets on Days 1,8,15,22 and 1 tablet on Days 2,3,9,10,16,17,23, and 24. 48 tablet 1   • docusate sodium (DOCQLACE) 100 MG capsule Take  by mouth.     • FLUoxetine (PROzac) 10 MG capsule Take 10 mg by mouth Daily.     • folic acid (FOLVITE) 1 MG tablet TK 1 T PO QD  5   • gabapentin (NEURONTIN) 300 MG capsule TAKE 1 CAPSULE BY MOUTH THREE TIMES DAILY 90 capsule 0   • Glucosamine-Chondroitin (OSTEO BI-FLEX REGULAR STRENGTH) 250-200 MG tablet Take 1 tablet by mouth 2 (Two) Times a Day.     • K-PHOS-NEUTRAL 155-852-130 MG tablet TAKE 1 TABLET BY MOUTH DAILY 30 tablet 5   • levETIRAcetam (KEPPRA) 1000 MG tablet TAKE 1 1/2 TABLET BY MOUTH TWICE DAILY 90 tablet 0   • lidocaine (LMX) 4 % cream Apply  topically As Needed for Mild Pain .     • loratadine (CLARITIN) 10 MG tablet Take  by mouth.     • MELATONIN PO Take 2 mg by mouth Every Evening.     • methylphenidate (CONCERTA) 36 MG CR tablet Take 36 mg by mouth Every Morning       • ondansetron (ZOFRAN) 8 MG tablet Take 1 tablet by mouth 3 (Three) Times a Day As Needed for Nausea or Vomiting. 30 tablet 5   • pantoprazole (PROTONIX) 40 MG EC tablet Take 1 tablet by mouth Daily. 30 tablet 5   • pomalidomide (POMALYST) 4 MG chemo capsule Take 1 capsule by mouth Daily. Take on an empty stomach, on days 1-21, then off for 7 days. [id number] 21 capsule 0   • Probiotic Product (PROBIOTIC PO) Take 1 tablet by mouth 2 (Two) Times a Day As Needed.     • Simethicone (GAS-X PO) Take   by mouth.     • sulfamethoxazole-trimethoprim (BACTRIM DS,SEPTRA DS) 800-160 MG per tablet Take 1 tablet by mouth 3 (Three) Times a Week (Mondays, Wednesdays and Fridays). 12 tablet 11   • Valerian Root 450 MG capsule Take  by mouth.     • vitamin B-12 (CYANOCOBALAMIN) 500 MCG tablet Take 500 mcg by mouth Daily.     • warfarin (COUMADIN) 5 MG tablet Take 5 mg by mouth As Needed.     • [DISCONTINUED] cyclobenzaprine (FLEXERIL) 5 MG tablet Take 5 mg by mouth As Needed for Muscle Spasms.     • [DISCONTINUED] acyclovir (ZOVIRAX) 400 MG tablet Take 1 tablet by mouth 2 (Two) Times a Day. 60 tablet 11   • [DISCONTINUED] dexamethasone (DECADRON) 4 MG tablet TAKE 10 TABLETS BY MOUTH WITH BREAKFAST, ON DAYS 1, 8, 15 AND 22 40 tablet 5   • [DISCONTINUED] lenalidomide (REVLIMID) 25 MG capsule Take 1 capsule by mouth Daily. For 21 days, followed by 7 days off. Adult Male [id number] 21 capsule 0   • [DISCONTINUED] ondansetron (ZOFRAN) 8 MG tablet TAKE 1 TABLET BY MOUTH THREE TIMES DAILY FOR NAUSEA AND VOMITING 30 tablet 5   • [DISCONTINUED] sulfamethoxazole-trimethoprim (BACTRIM DS,SEPTRA DS) 800-160 MG per tablet TAKE 1 TABLET PO BID ON SATURDAYS AND SUNDAYS FOR PROPHYLAXIS  3   • [DISCONTINUED] sulfamethoxazole-trimethoprim (BACTRIM DS,SEPTRA DS) 800-160 MG per tablet TAKE 1 TABLET BY MOUTH TWICE DAILY ON SATURDAYS AND SUNDAYS FOR PROPHYLAXIS 60 tablet 5     Current Facility-Administered Medications on File Prior to Visit   Medication Dose Route Frequency Provider Last Rate Last Dose   • sodium chloride 0.9 % infusion 250 mL  250 mL Intravenous Once Susan Coronado APRN           Past Medical History:   Diagnosis Date   • Arthritis    • Stroke (CMS/HCC)        Past Surgical History:   Procedure Laterality Date   • OTHER SURGICAL HISTORY      jaw surgery   • OTHER SURGICAL HISTORY      Open Treatment of Maxillary Alveolar Ridge Fracture   • OTHER SURGICAL HISTORY      tonsilectomy       Social History     Social History   •  "Marital status:      Spouse name: N/A   • Number of children: N/A   • Years of education: N/A     Occupational History   • Not on file.     Social History Main Topics   • Smoking status: Former Smoker   • Smokeless tobacco: Never Used      Comment: Quit in 1986   • Alcohol use No   • Drug use: No   • Sexual activity: Defer     Other Topics Concern   • Not on file     Social History Narrative   • No narrative on file       Review of Systems   Constitutional: Negative for fever and unexpected weight change.   Respiratory: Negative for cough and shortness of breath.    Cardiovascular: Negative for chest pain.   Neurological: Negative for seizures.       Objective   Blood pressure 132/68, height 177.8 cm (70\"), weight 125 kg (275 lb).    Physical Exam   Constitutional: He is oriented to person, place, and time. He appears well-developed and well-nourished.   HENT:   Head: Normocephalic and atraumatic.   Eyes: Pupils are equal, round, and reactive to light. EOM are normal.   Pulmonary/Chest: Effort normal.   Neurological: He is oriented to person, place, and time. He has a normal Finger-Nose-Finger Test and a normal Heel to Shin Test. Gait normal.   Skin: Skin is warm and dry.   Psychiatric: He has a normal mood and affect. His speech is normal and behavior is normal. Judgment and thought content normal.   Nursing note and vitals reviewed.      Neurologic Exam     Mental Status   Oriented to person, place, and time.   Speech: speech is normal   Level of consciousness: alert  Knowledge: consistent with education.   Able to name object. Normal comprehension.     Cranial Nerves     CN II   Visual fields full to confrontation.     CN III, IV, VI   Pupils are equal, round, and reactive to light.  Extraocular motions are normal.     CN V   Facial sensation intact.   Right facial sensation deficit: none  Left facial sensation deficit: none    CN VII   Right facial weakness: none  Left facial weakness: none    CN IX, X "   CN IX normal.   CN X normal.   Palate: symmetric    CN XI   CN XI normal.     CN XII   CN XII normal.   Right face held more loosely than left, where palpebral fissure also narrower, but no ptosis     Motor Exam   Muscle bulk: normal  Right arm pronator drift: absent  Left arm pronator drift: absent    Strength   Strength 5/5 except as noted.     Gait, Coordination, and Reflexes     Gait  Gait: normal    Coordination   Finger to nose coordination: normal  Heel to shin coordination: normal    Tremor   Resting tremor: absent  Intention tremor: present  Action tremor: left arm and right arm      Assessment/Plan     Johnny was seen today for localization-related symptomatic epilepsy and epileptic synd.    Diagnoses and all orders for this visit:    Left facial pain    Other orders  -     OXcarbazepine (TRILEPTAL) 300 MG tablet; Start one tab twice daily, and increase if needed to 1 in am 2 at night, up to 2 tabs twice daily      Discussion/Summary:  Most c/w atypical trigeminal neuralgia. Will start OXC, discussed potential SEs at length. Will f/u on him MRI being done this afternoon. Discussed alternative dx and meds, primary/idiopathic vs secondary TN.   Pt to phone re: effectiveness.  Keep f/u in October.

## 2018-08-08 PROBLEM — Z86.718 HISTORY OF DVT (DEEP VEIN THROMBOSIS): Status: ACTIVE | Noted: 2018-01-01

## 2018-08-08 PROBLEM — N18.2 CKD (CHRONIC KIDNEY DISEASE), STAGE II: Chronic | Status: ACTIVE | Noted: 2018-01-01

## 2018-08-08 PROBLEM — R73.02 IGT (IMPAIRED GLUCOSE TOLERANCE): Status: ACTIVE | Noted: 2018-01-01

## 2018-08-08 PROBLEM — N17.9 ACUTE RENAL FAILURE (ARF) (HCC): Status: ACTIVE | Noted: 2018-01-01

## 2018-08-08 PROBLEM — Z23 NEED FOR SHINGLES VACCINE: Status: ACTIVE | Noted: 2018-01-01

## 2018-08-08 PROBLEM — D64.9 ANEMIA: Chronic | Status: ACTIVE | Noted: 2018-01-01

## 2018-08-08 PROBLEM — R62.7 FTT (FAILURE TO THRIVE) IN ADULT: Status: ACTIVE | Noted: 2018-01-01

## 2018-08-08 PROBLEM — R79.1 SUPRATHERAPEUTIC INR: Status: ACTIVE | Noted: 2018-01-01

## 2018-08-08 NOTE — PROGRESS NOTES
"CHEMOTHERAPY PREPARATION    Johnny Ramirez  [id number]  1945    Chief Complaint: Chemotherapy preparation for myeloma management and left jaw pain     History of present illness:  Johnny Ramirez is a 72 y.o. year old male who is here today for chemotherapy preparation and needs assessment. The patient has been diagnosed with multiple myeloma and is scheduled to begin oral and IV treatment with decadron, Pomalyst, and Daratumumab. He reports having severe left jaw pain. It hurts to chew and he is only able to drink Boost/Ensures. He has a few old Norco 5/325 mg from 2014 that he has been using to help control the pain.     Oncology History:     No history exists.       Past Medical History:   Diagnosis Date   • Arthritis    • Stroke (CMS/HCC)        Past Surgical History:   Procedure Laterality Date   • OTHER SURGICAL HISTORY      jaw surgery   • OTHER SURGICAL HISTORY      Open Treatment of Maxillary Alveolar Ridge Fracture   • OTHER SURGICAL HISTORY      tonsilectomy       MEDICATIONS: The current medication list was reviewed and reconciled.     Allergies:  is allergic to other and trichophyton.    Family History   Problem Relation Age of Onset   • Cancer Other    • Diabetes Other          Review of Systems    Physical Exam  Vital Signs: /71   Pulse 90   Temp 97.6 °F (36.4 °C) (Temporal Artery )   Resp 22   Ht 177.8 cm (70\")   Wt 125 kg (275 lb 9.6 oz)   SpO2 92%   BMI 39.54 kg/m²    General Appearance:  alert, cooperative, no apparent distress and appears stated age   Neurologic/Psychiatric: A&O x 3, gait steady, appropriate affect   HEENT:  Normocephalic, without obvious abnormality, mucous membranes moist   Lungs:   Clear to auscultation bilaterally; respirations regular, even, and unlabored bilaterally   Heart:  Regular rate and rhythm, no murmurs appreciated   Extremities: Normal, atraumatic; no clubbing, cyanosis, or edema    Skin: No rashes, lesions, or abnormal coloration noted "     ECOG Performance Status: (1) Restricted in physically strenuous activity, ambulatory and able to do work of light nature          NEEDS ASSESSMENTS    Genetics  The patient's new diagnosis and family history have been reviewed for genetic counseling needs. A genetic referral is not recommended.     Psychosocial  The patient has completed a PHQ-9 Depression Screening and the Distress Thermometer (DT) today.   PHQ-9 results show 5-9 (Mild Depression). The patient scored their distress today as 8 on a scale of 0-10 with 0 being no distress and 10 being extreme distress.   Problems marked as being an issue for him within the last week include practical problems.   Results were reviewed along with psychosocial resources offered by our cancer center. Our oncology social worker will be flagged for a DT score of 4 or above, and a same day call will be made for a score of 9 or 10. A mental health referral is recommended at this time. The patient is accepting of a referral to [facility].   Copies of patient's questionnaires will be scanned into EMR for details and further reference.    Barriers to care  A barriers form was also completed by the patient today. We discussed services offered by our facility to help him have adequate access to care. The patient was given the name and card for our Oncology Social Worker, Scarlett Chino. Based upon barriers assessment today, the patient will require a follow-up call from the  to further discuss needs.   A copy of the barriers form will also be scanned into EMR for details and further reference.     VAD Assessment  The patient and I discussed planned intervenous chemotherapy as well as other IV treatments that are often needed throughout the course of treatment. These may include, but are not limited to blood transfusions, antibiotics, and IV hydration. The patient's vasculature does appear to be adequate for multiple peripheral IVs throughout their  "treatment course. Discussed risks and benefits of VADs. The patient would not like to pursue Port-A-Cath insertion prior to initiation of treatment.     Advanced Care Planning  The patient and I discussed advanced care planning, \"Conversations that Matter\".   This service was offered, free of charge, for development of advance directives with a certified ACP facilitator.  The patient does have an up-to-date advanced directive. This document is not on file with our office. The patient is not interested in an appointment with one of our facilitators to create or update their advanced directives.      Palliative Care  The patient and I discussed palliative care services. Palliative care is not the same as Hospice care. This is specialized medical care for people living with serious illness with the goal of improving quality of life for the patient and their family. [facility] has partnered with [facility] to offer our patients outpatient palliative care early along with their treatment to assist in coordination of care, symptom management, pain management, and medical decision making.  Oncology criteria for palliative care referral is not met at this time. The patient is not interested in a palliative care consultation.     Additional Referral needs  none      IV CHEMOTHERAPY EDUCATION    Booklets Given: Chemotherapy and You [x]  Eating Hints [x]    Sexuality/Fertility Books [x]      Chemotherapy/Biotherapy Education Sheets: (list all that apply)  nausea management, acid reflux management, diarrhea management, Cancer resourse contacts information, skin and mouth care and vaccination information                                                                                                                                                                 Chemotherapy Regimen:   Treatment Plans     Name Type Plan dates Plan Provider         Active    OP Zoledronic Acid Q6W ONCOLOGY SUPPORTIVE CARE 1  4/14/2017 " - Present Rosalie Segovia MD     OP MULTIPLE MYELOMA Daratumumab / Pomalidomide / Dexamethasone  ONCOLOGY TREATMENT  7/30/2018 - Present Rosalie Segovia MD                    TOPICS EDUCATION PROVIDED COMMENTS   ANEMIA:  role of RBC, cause, s/s, ways to manage, role of transfusion [x]    THROMBOCYTOPENIA:  role of platelet, cause, s/s, ways to prevent bleeding, things to avoid, when to seek help [x]    NEUTROPENIA:  role of WBC, cause, infection precautions, s/s of infection, when to call MD [x]    NUTRITION & APPETITE CHANGES:  importance of maintaining healthy diet & weight, ways to manage to improve intake, dietary consult, exercise regimen [x]    DIARRHEA:  causes, s/s of dehydration, ways to manage, dietary changes, when to call MD [x]    CONSTIPATION:  causes, ways to manage, dietary changes, when to call MD [x]    NAUSEA & VOMITING:  cause, use of antiemetics, dietary changes, when to call MD [x]    MOUTH SORES:  causes, oral care, ways to manage [x]    ALOPECIA:  cause, ways to manage, resources [x]    INFERTILITY & SEXUALITY:  causes, fertility preservation options, sexuality changes, ways to manage, importance of birth control [x]    NERVOUS SYSTEM CHANGES:  causes, s/s, neuropathies, cognitive changes, ways to manage [x]    PAIN:  causes, ways to manage [x] ????   SKIN & NAIL CHANGES:  cause, s/s, ways to manage [x]    ORGAN TOXICITIES:  cause, s/s, need for diagnostic tests, labs, when to notify MD [x]    SURVIVORSHIP:  distress, distress assessment, secondary malignancies, early/late effects, follow-up, social issues, social support [x]    HOME CARE:  use of spill kits, storing of PO chemo, how to manage bodily fluids [x]    MISCELLANEOUS:  drug interactions, administration, vesicant, et [x]                ORAL CHEMOTHERAPY TEACHING    Oral Chemotherapy Regimen: Pomalyst     Date of Medication Start: week of 8/8/2018    Initial Teaching  [x] Comments   Safety    Storage instructions (away from children;  away from heat/cold, sunlight, or moisture), handling - use of gloves (caregivers), washing hands after touching pills, managing waste    Adherence     patient and/or caregiver on how to take medications, take with/without food, assess their adherence potential, stress importance of adherence, ways to manage adherence (pill boxes, phone reminders, calendars), what to do if a dose is missed    Side Effects/Adverse Reactions     patient of potential side effects, s/s, ways to manage, when to call MD or seek help    Miscellaneous    Food interactions, DDIs, financial issues      Additional chemotherapy notes:        Assessment and Plan:    Multiple Myeloma     This was a 60 minute face-to-face visit with 55 minutes spent in  counseling and coordination of care as documented above.   The patient and I have reviewed their new cancer diagnosis and scheduled treatment plan. Needs assessment was completed including genetics, psychosocial needs, barriers to care, VAD evaluation, advanced care planning, and palliative care services. Referrals have been ordered as appropriate based upon our evaluation and patient desires.     IV and oral chemotherapy teaching was also completed today as documented above. Adequate time was given to answer all questions to his satisfaction. Patient and family are aware of their care team members and contact information if they have questions or problems throughout the treatment course. Needs assessments and education has been completed. The patient is adequately prepared to begin treatment as scheduled.       I received a call from Dr. Segovia to review patients rising BUN and creatinine. He would like patient direct admitted to [facility]. Call placed to hospitalist who is willing to admit the patient.         Citlalli Sousa, APRN     Addendum:   Review of Systems:   A comprehensive 14 point review of systems was performed and was negative except as mentioned.    Citlalli Sousa,  APRN

## 2018-08-08 NOTE — TELEPHONE ENCOUNTER
Yes, I would say he should stop trileptal, but hold onto the pills, because if he does not get quick pain relief from the radiation, we can still try trileptal.

## 2018-08-08 NOTE — TELEPHONE ENCOUNTER
Dr rg contacting patient with MRI results.  He spoke with radiation, and they will be getting him in to be seen today or tomorrow

## 2018-08-08 NOTE — TELEPHONE ENCOUNTER
Pt wanted to let you know that his MRI is available.  He is going to start radiation possibly today.  Do you want him to stop the Trileptal?

## 2018-08-09 PROBLEM — N17.9 ARF (ACUTE RENAL FAILURE) (HCC): Status: ACTIVE | Noted: 2018-01-01

## 2018-08-10 NOTE — ANESTHESIA PREPROCEDURE EVALUATION
Anesthesia Evaluation     Patient summary reviewed and Nursing notes reviewed   NPO Solid Status: > 8 hours  NPO Liquid Status: > 8 hours           Airway   Mallampati: III  TM distance: >3 FB  Neck ROM: full  No difficulty expected  Dental      Pulmonary     breath sounds clear to auscultation  Cardiovascular     ECG reviewed  Rhythm: regular  Rate: normal        Neuro/Psych  (+) seizures, dizziness/light headedness,     GI/Hepatic/Renal/Endo      Musculoskeletal     Abdominal    Substance History      OB/GYN          Other   (+) arthritis   history of cancer active    ROS/Med Hx Other: TTE : normal systolic function, impaired diastolic dysfuncition; multiple myeloma                  Anesthesia Plan    ASA 4     MAC     intravenous induction   Anesthetic plan and risks discussed with spouse/significant other.    Plan discussed with CRNA.

## 2018-08-10 NOTE — ANESTHESIA POSTPROCEDURE EVALUATION
Patient: Johnny Ramirez    Procedure Summary     Date:  08/10/18 Room / Location:   [facility]    Anesthesia Start:  1036 Anesthesia Stop:      Procedure:  TUNNELED DIALYSIS CATHETER INSERTION (Right Chest) Diagnosis:      Surgeon:  Dipak Tolliver MD Provider:  Kristopher Tiwari MD    Anesthesia Type:  MAC ASA Status:  4          Anesthesia Type: MAC  Last vitals  BP   122/41   Temp   97.5   Pulse   83   Resp 12   SpO2 96%     Post Anesthesia Care and Evaluation    Patient location during evaluation: PACU  Patient participation: complete - patient cannot participate  Post-procedure mental status: asleep.  Pain score: 0  Pain management: adequate  Airway patency: patent  Anesthetic complications: No anesthetic complications  PONV Status: none  Cardiovascular status: acceptable  Respiratory status: acceptable  Hydration status: acceptable

## 2018-08-13 PROBLEM — I46.9 ASYSTOLE (HCC): Status: ACTIVE | Noted: 2018-01-01

## 2018-08-14 PROBLEM — R41.0 DELIRIUM: Status: ACTIVE | Noted: 2018-01-01

## 2018-08-16 NOTE — TELEPHONE ENCOUNTER
Pt's wife called. Not sure if we were aware, but he fell in the hospital last Thursday and was put on emergency dyalisis for a whopping case of uremia. Also having trouble focusing with his eyes. She had been told a neurologist would be in to see him at some point and of course they are very fond of  and wondered if she could stop by. Informed her she is in clinic today but will definitely make her aware and hopefully we can help him be seen by one if this has not already happened.    He is in the intensive care [address] [place] [address].

## 2018-08-19 PROBLEM — R79.1 SUPRATHERAPEUTIC INR: Status: RESOLVED | Noted: 2018-01-01 | Resolved: 2018-01-01

## 2019-04-20 NOTE — PROGRESS NOTES
DATE OF VISIT: 5/29/2018    REASON FOR VISIT: Followup for multiple myeloma, M-spike at diagnosis 2.4,  kappa restricted, with diffuse lytic lesions.      HISTORY OF PRESENT ILLNESS: The patient is a very pleasant 70-year-old  gentleman with past medical history significant for multiple myeloma diagnosed  11/26/2013. The patient presented with back pain, had a CAT scan done that  showed multiple vertebral body fractures with lytic lesions. He had a T11  biopsy done that came back with plasmocytosis compatible with multiple myeloma.  The patient was referred to me on 12/03/2013. I did a bone marrow biopsy that  came back consistent with multiple myeloma. Cytogenics at this point are still  pending. Whole body bone survey showed multiple bony lytic lesions affecting  right humerus, vertebral bodies, as well as pelvic bones. The patient had  serum protein electrophoresis done at diagnosis that showed monoclonal protein  of 2.5 grams plus 0.5 grams, kappa restricted. 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 The  patient was started on Revlimid 1.2 mg/sq m subcu once weekly 2 weeks on and 1  week off, Velcade 25 mg p.o. daily 2 weeks on and 1 week off, and dexamethasone  40 mg p.o. weekly on 12/09/2013. The patient completed cycle #6 on 04/07/2014.  The patient received autologous stem cell transplant with Dr. Bry Johnson at  [facility] on 05/13/2014. His day 100 was 08/26/2014. He had serum  protein electrophoresis that failed to show any evidence of monoclonal protein,  serum free light chain ratio was slightly elevated. The patient was started on  Revlimid 15 mg p.o. daily 2 weeks on and 1 week off on 06/16/2015. The patient's therapy was changed to Ninlaro 4 mg weekly 3 weeks on and 1 week off due to rising kappa lambda ratio. His kappa lambda ration continued rise of Ninlaro and he was started on Decadron 40 mg weekly with Revlimid 25 mg 2  weeks on and 1 week off on 4/14/2017.  His serum free light chain started to increase and treatment was switched to Carfilzumab,Revlimid with dexamethasone , started on April 3, 2018.  The patient is here today for a scheduled followup visit.    SUBJECTIVE: Mr. Ramirez is here today by himself.  He continued to complain of fatigue.  Numbness is stable.  He continued to have stable joint pain.  He is cutting back on carbohydrates and losing a few pounds every month.  He had mild shortness of breath after the infusion, last for few days.  Denied any swelling in his legs.    PAST MEDICAL HISTORY/SOCIAL HISTORY/FAMILY HISTORY: Unchanged from my prior documentation done on 12/03/2013.    Review of Systems   Constitutional: Positive for fatigue. Negative for activity change, appetite change, chills, fever and unexpected weight change.   HENT: Negative for hearing loss, mouth sores, nosebleeds, sore throat and trouble swallowing.    Eyes: Negative for visual disturbance.   Respiratory: Negative for cough, chest tightness, shortness of breath and wheezing.    Cardiovascular: Negative for chest pain, palpitations and leg swelling.   Gastrointestinal: Negative for abdominal distention, abdominal pain, blood in stool, constipation, diarrhea, nausea, rectal pain and vomiting.   Endocrine: Negative for cold intolerance and heat intolerance.   Genitourinary: Negative for difficulty urinating, dysuria, frequency and urgency.   Musculoskeletal: Positive for arthralgias. Negative for back pain, gait problem, joint swelling and myalgias.   Skin: Negative for rash.   Neurological: Positive for numbness. Negative for dizziness, tremors, syncope, weakness, light-headedness and headaches.   Hematological: Negative for adenopathy. Does not bruise/bleed easily.   Psychiatric/Behavioral: Negative for confusion, sleep disturbance and suicidal ideas. The patient is not nervous/anxious.          Current Outpatient Prescriptions:   •  calcium  carbonate (TUMS) 500 MG chewable tablet, Chew 1 tablet Daily., Disp: , Rfl:   •  Simethicone (GAS-X PO), Take  by mouth., Disp: , Rfl:   •  acyclovir (ZOVIRAX) 400 MG tablet, Take 1 tablet by mouth 2 (Two) Times a Day., Disp: 60 tablet, Rfl: 11  •  aspirin 81 MG tablet, Take 81 mg by mouth Daily., Disp: , Rfl:   •  azelastine (ASTELIN) 0.1 % nasal spray, into each nostril 2 (two) times a day., Disp: , Rfl:   •  baclofen (LIORESAL) 10 MG tablet, TK 1 - 2 TS QHS, Disp: , Rfl: 2  •  Calcium Carb-Cholecalciferol (CALCIUM + D3) 600-200 MG-UNIT tablet, Take  by mouth., Disp: , Rfl:   •  carfilzomib (KYPROLIS) 30 MG chemo syringe, , Disp: , Rfl:   •  cyclobenzaprine (FLEXERIL) 5 MG tablet, Take 5 mg by mouth As Needed for Muscle Spasms., Disp: , Rfl:   •  dexamethasone (DECADRON) 4 MG tablet, TAKE 10 TABLETS BY MOUTH WITH BREAKFAST, ON DAYS 1, 8, 15 AND 22 (Patient taking differently: TAKE 5 TABLETS BY MOUTH WITH BREAKFAST, ON DAYS 1, 8, 15 AND 22), Disp: 40 tablet, Rfl: 5  •  docusate sodium (DOCQLACE) 100 MG capsule, Take  by mouth., Disp: , Rfl:   •  folic acid (FOLVITE) 1 MG tablet, TK 1 T PO QD, Disp: , Rfl: 5  •  gabapentin (NEURONTIN) 300 MG capsule, TAKE 1 CAPSULE BY MOUTH THREE TIMES DAILY, Disp: 90 capsule, Rfl: 0  •  Glucosamine-Chondroitin (OSTEO BI-FLEX REGULAR STRENGTH) 250-200 MG tablet, Take 1 tablet by mouth 2 (Two) Times a Day., Disp: , Rfl:   •  lenalidomide (REVLIMID) 25 MG capsule, Take 1 capsule by mouth Daily for 21 days. then off 7 days. Adult Male [id number], Disp: 21 capsule, Rfl: 0  •  levETIRAcetam (KEPPRA) 1000 MG tablet, TAKE 1 1/2 TABLET BY MOUTH TWICE DAILY (Patient taking differently: takes half tablet twice daily), Disp: 90 tablet, Rfl: 0  •  lidocaine (LMX) 4 % cream, Apply  topically As Needed for Mild Pain ., Disp: , Rfl:   •  loratadine (CLARITIN) 10 MG tablet, Take  by mouth., Disp: , Rfl:   •  methylphenidate (CONCERTA) 36 MG CR tablet, Take 36 mg by mouth Every Morning  , Disp: ,  Rfl:   •  ondansetron (ZOFRAN) 8 MG tablet, TAKE 1 TABLET BY MOUTH THREE TIMES DAILY FOR NAUSEA AND VOMITING, Disp: 30 tablet, Rfl: 5  •  pantoprazole (PROTONIX) 40 MG EC tablet, Take 1 tablet by mouth Daily., Disp: 30 tablet, Rfl: 5  •  phosphorus (K PHOS NEUTRAL) 155-852-130 MG tablet, Take 1 tablet by mouth Daily., Disp: 30 tablet, Rfl: 2  •  Probiotic Product (PROBIOTIC PO), Take 1 tablet by mouth 2 (Two) Times a Day As Needed., Disp: , Rfl:   •  sulfamethoxazole-trimethoprim (BACTRIM DS,SEPTRA DS) 800-160 MG per tablet, TAKE 1 TABLET PO BID ON SATURDAYS AND SUNDAYS FOR PROPHYLAXIS, Disp: , Rfl: 3  •  Valerian Root 450 MG capsule, Take  by mouth., Disp: , Rfl:   •  vitamin B-12 (CYANOCOBALAMIN) 500 MCG tablet, Take 500 mcg by mouth Daily., Disp: , Rfl:   •  warfarin (COUMADIN) 5 MG tablet, Take 5 mg by mouth As Needed., Disp: , Rfl:   No current facility-administered medications for this visit.     Facility-Administered Medications Ordered in Other Visits:   •  sodium chloride 0.9 % infusion 250 mL, 250 mL, Intravenous, Once, CHRISTY Lemus  •  sodium chloride 0.9 % infusion 250 mL, 250 mL, Intravenous, Once, Rosalie Segovia MD    PHYSICAL EXAMINATION:   /87   Pulse 85   Temp 96.5 °F (35.8 °C) (Temporal Artery )   Resp 18   Wt 134 kg (296 lb)   SpO2 95%   BMI 42.47 kg/m²    ECOG Performance Status: 1 - Symptomatic but completely ambulatory  General Appearance:  alert, cooperative, no apparent distress and appears stated age   Neurologic/Psychiatric: A&O x 3, gait steady, appropriate affect, strength 5/5 in all muscle groups   HEENT:  Normocephalic, without obvious abnormality, mucous membranes moist   Neck: Supple, symmetrical, trachea midline, no adenopathy;  No thyromegaly, masses, or tenderness   Lungs:   Clear to auscultation bilaterally; respirations regular, even, and unlabored bilaterally   Heart:  Regular rate and rhythm, no murmurs appreciated   Abdomen:   Soft, non-tender,  non-distended and no organomegaly   Lymph nodes: No cervical, supraclavicular, inguinal or axillary adenopathy noted   Extremities: Normal, atraumatic; no clubbing, cyanosis, or edema    Skin: No rashes, ulcers, or suspicious lesions noted     Lab on 05/24/2018   Component Date Value Ref Range Status   • Glucose 05/24/2018 149* 70 - 100 mg/dL Final   • BUN 05/24/2018 16  9 - 23 mg/dL Final   • Creatinine 05/24/2018 0.90  0.60 - 1.30 mg/dL Final   • Sodium 05/24/2018 141  132 - 146 mmol/L Final   • Potassium 05/24/2018 4.0  3.5 - 5.5 mmol/L Final   • Chloride 05/24/2018 107  99 - 109 mmol/L Final   • CO2 05/24/2018 29.0  20.0 - 31.0 mmol/L Final   • Calcium 05/24/2018 8.2* 8.7 - 10.4 mg/dL Final   • Total Protein 05/24/2018 5.3* 5.7 - 8.2 g/dL Final   • Albumin 05/24/2018 3.90  3.20 - 4.80 g/dL Final   • ALT (SGPT) 05/24/2018 28  7 - 40 U/L Final   • AST (SGOT) 05/24/2018 20  0 - 33 U/L Final   • Alkaline Phosphatase 05/24/2018 73  25 - 100 U/L Final   • Total Bilirubin 05/24/2018 0.6  0.3 - 1.2 mg/dL Final   • eGFR Non  Amer 05/24/2018 83  >60 mL/min/1.73 Final   • Globulin 05/24/2018 1.4  gm/dL Final   • A/G Ratio 05/24/2018 2.8* 1.5 - 2.5 g/dL Final   • BUN/Creatinine Ratio 05/24/2018 17.8  7.0 - 25.0 Final   • Anion Gap 05/24/2018 5.0  3.0 - 11.0 mmol/L Final   • Magnesium 05/24/2018 1.8  1.3 - 2.7 mg/dL Final   • Phosphorus 05/24/2018 3.0  2.4 - 5.1 mg/dL Final   • WBC 05/24/2018 5.10  3.50 - 10.80 10*3/mm3 Final   • RBC 05/24/2018 3.91* 4.20 - 5.76 10*6/mm3 Final   • Hemoglobin 05/24/2018 11.6* 13.1 - 17.5 g/dL Final   • Hematocrit 05/24/2018 38.1* 38.9 - 50.9 % Final   • RDW 05/24/2018 18.4* 11.3 - 14.5 % Final   • MCV 05/24/2018 97.4  80.0 - 99.0 fL Final   • MCH 05/24/2018 29.8  27.0 - 31.0 pg Final   • MCHC 05/24/2018 30.6* 32.0 - 36.0 g/dL Final   • MPV 05/24/2018 9.0  6.0 - 12.0 fL Final   • Platelets 05/24/2018 124* 150 - 450 10*3/mm3 Final    Verified by repeat analysis.    • Neutrophil %  05/24/2018 73.3* 41.0 - 71.0 % Final   • Lymphocyte % 05/24/2018 19.2* 24.0 - 44.0 % Final   • Monocyte % 05/24/2018 7.5  0.0 - 12.0 % Final   • Neutrophils, Absolute 05/24/2018 3.70  1.50 - 8.30 10*3/mm3 Final   • Lymphocytes, Absolute 05/24/2018 1.00  0.60 - 4.80 10*3/mm3 Final   • Monocytes, Absolute 05/24/2018 0.40  0.00 - 1.00 10*3/mm3 Final        No results found.    ASSESSMENT: The patient is a very pleasant 71-year-old gentleman with new  diagnosis of multiple myeloma.    PROBLEM LIST:  1. Multiple myeloma, kappa restricted, M-spike 2.4 at diagnosis, ISS score  stage 2 with normal albumin but beta-2 microglobulin level of 4.1.   2. 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   3. Completed 6 cycles of Revlimid, Velcade, and dexamethasone from 12/09/2013  until 04/07/2014.  4. Received palliative radiation to T11, 4 treatments, completed 12/11/2013.   5. Status post autologous stem cell transplant done at [facility]  with Dr. Bry Johnson 05/13/2014.  6. Relapsed disease documented with elevated kappa lambda ratio with minute  amount of monoclonal protein done on 06/09/2015.   7. Bilateral pulmonary embolism, deep venous thrombosis 07/2014 on chronic  anticoagulation with Coumadin.  8. Chronic kidney disease Stage II.   9. 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   10. Started Revlimid 25 mg p.o. daily 2 weeks on and 1 week off on 06/16/2015.  11. Switched to Ninlaro on 02/21/2017 Secondary to increase in SFL ratio.  12. Added Decadron 40 mg weekly with Revlimid 25 mg 2 weeks on 1 week off on 4/14/2017 secondary to rising SFL ration on Ninlaro alone.   13.  Treatment switched to Carfilzumab,Revlimid with dexamethasone , started on April 3, 2018, status post 1 cycle  14. Leukopenia and thrombocytopenia.   15. Peripheral neuropathy  16.  Drug induced hepatitis  17.  Partial seizure disorder.    PLAN:  1. I will proceed with treatment as scheduled today with Decadron 20 mg weekly with Revlimid 25 mg 3 weeks on 1 week off, with Kyprolis 2 consecutive days weekly 3 weeks on and 1 week off.  Cycle #3.  2.  I will continue to monitor patient blood work including blood counts kidney function and liver enzymes..   3.  I will repeat serum free light chains on day 1 of each cycle.  This would be drawn today.  4. The patient will come back to see me in 4 weeks with blood work and treatment.  5. We will continue folic acid daily. I am suspecting his leukopenia and thrombocytopenia is secondary to Depakote Revlimid and Keppra.   6. I will continue warfarin for history of DVT with monoclonal gammopathy.   7.  The patient is still on Neurontin for peripheral neuropathy. He will follow up with his neurologist regarding management.   8.  I reviewed the potential side effects of Kyprolis and Revlimid including but not limited to peripheral neuropathy, edema, rash, diarrhea, low blood counts,hepatic insufficiency, and potential death.   9.  The patient will continue to follow up with Dr. Reyes at [facility] regarding whether the patient may be a candidate for a second autologous stem cell transplant.  10.  I will continue the patient on Bactrim DS twice a day on Saturdays and Sundays for prophylaxis since patient was given high-dose steroids.  11.  I will continue Protonix 40 mg daily for GI prophylaxis.  12.  The patient will continue Zometa 4 mg IV every 6 weeks with calcium and vitamin D due to mild diffuse lytic lesions seen on skeletal survey done 2/21/2017.    13.  I will continue patient on tramadol 50 mg daily at bedtime as needed for cancer-related pain.  14.  I'll continue patient on Acyclovir 400 mg twice a day for HSV prophylaxis.    Rosalie Segovia MD  5/29/2018  9:25 AM     20-Apr-2019 13:30

## (undated) DEVICE — SNAP KOVER: Brand: UNBRANDED

## (undated) DEVICE — BANDAGE,GAUZE,BULKEE II,4.5"X4.1YD,STRL: Brand: MEDLINE

## (undated) DEVICE — PK MINOR SPLT 10

## (undated) DEVICE — DRSNG TELFA PAD NONADH STR 1S 3X8IN

## (undated) DEVICE — ADHS LIQ MASTISOL 2/3ML

## (undated) DEVICE — MEDI-VAC NON-CONDUCTIVE SUCTION TUBING: Brand: CARDINAL HEALTH

## (undated) DEVICE — 3M™ STERI-STRIP™ REINFORCED ADHESIVE SKIN CLOSURES, R1547, 1/2 IN X 4 IN (12 MM X 100 MM), 6 STRIPS/ENVELOPE: Brand: 3M™ STERI-STRIP™

## (undated) DEVICE — SUT ETHLN 2/0 PS 18IN 585H

## (undated) DEVICE — NDL HYPO ECLPS SFTY 18G 1 1/2IN

## (undated) DEVICE — SYR LUERLOK 20CC

## (undated) DEVICE — DECANT BG O JET

## (undated) DEVICE — 3M™ TEGADERM™ CHG DRESSING 25/CARTON 4 CARTONS/CASE 1658: Brand: TEGADERM™

## (undated) DEVICE — ENCORE® LATEX MICRO SIZE 7.5, STERILE LATEX POWDER-FREE SURGICAL GLOVE: Brand: ENCORE

## (undated) DEVICE — SUT MNCRYL PLS ANTIB UD 4/0 PS2 18IN

## (undated) DEVICE — DRSNG SURESITE WNDW 2.38X2.75

## (undated) DEVICE — PAD HEMOST NEPTUNE 2X2IN

## (undated) DEVICE — KT CATH TAL PALINDROME RUBY 14.5F23CM

## (undated) DEVICE — AIRWY 90MM NO9

## (undated) DEVICE — CANN NASL CO2 DIVIDED A/

## (undated) DEVICE — INTRAOPERATIVE COVER KIT, 10 PACK: Brand: SITE-RITE

## (undated) DEVICE — MEDI-VAC YANKAUER SUCTION HANDLE W/BULBOUS TIP: Brand: CARDINAL HEALTH